# Patient Record
Sex: FEMALE | Race: WHITE | NOT HISPANIC OR LATINO | Employment: OTHER | URBAN - NONMETROPOLITAN AREA
[De-identification: names, ages, dates, MRNs, and addresses within clinical notes are randomized per-mention and may not be internally consistent; named-entity substitution may affect disease eponyms.]

---

## 2018-02-14 ENCOUNTER — OFFICE VISIT CONVERTED (OUTPATIENT)
Dept: FAMILY MEDICINE CLINIC | Age: 58
End: 2018-02-14
Attending: FAMILY MEDICINE

## 2018-06-14 ENCOUNTER — OFFICE VISIT CONVERTED (OUTPATIENT)
Dept: FAMILY MEDICINE CLINIC | Age: 58
End: 2018-06-14
Attending: FAMILY MEDICINE

## 2018-09-18 ENCOUNTER — OFFICE VISIT CONVERTED (OUTPATIENT)
Dept: FAMILY MEDICINE CLINIC | Age: 58
End: 2018-09-18
Attending: FAMILY MEDICINE

## 2018-11-21 ENCOUNTER — OFFICE VISIT CONVERTED (OUTPATIENT)
Dept: FAMILY MEDICINE CLINIC | Age: 58
End: 2018-11-21
Attending: FAMILY MEDICINE

## 2018-12-28 ENCOUNTER — OFFICE VISIT CONVERTED (OUTPATIENT)
Dept: FAMILY MEDICINE CLINIC | Age: 58
End: 2018-12-28
Attending: FAMILY MEDICINE

## 2019-01-25 ENCOUNTER — OFFICE VISIT CONVERTED (OUTPATIENT)
Dept: FAMILY MEDICINE CLINIC | Age: 59
End: 2019-01-25
Attending: FAMILY MEDICINE

## 2019-01-28 ENCOUNTER — HOSPITAL ENCOUNTER (OUTPATIENT)
Dept: OTHER | Facility: HOSPITAL | Age: 59
Discharge: HOME | End: 2019-01-28
Attending: FAMILY MEDICINE

## 2019-02-18 ENCOUNTER — HOSPITAL ENCOUNTER (OUTPATIENT)
Dept: OTHER | Facility: HOSPITAL | Age: 59
Discharge: HOME | End: 2019-02-18
Attending: FAMILY MEDICINE

## 2019-02-18 ENCOUNTER — OFFICE VISIT CONVERTED (OUTPATIENT)
Dept: FAMILY MEDICINE CLINIC | Age: 59
End: 2019-02-18
Attending: FAMILY MEDICINE

## 2019-02-18 LAB
ALBUMIN SERPL-MCNC: 4.2 G/DL (ref 3.5–5)
ALBUMIN/GLOB SERPL: 1.4 {RATIO} (ref 1.4–2.6)
ALP SERPL-CCNC: 86 U/L (ref 53–141)
ALT SERPL-CCNC: 73 U/L (ref 10–40)
ANION GAP SERPL CALC-SCNC: 18 MMOL/L (ref 8–19)
AST SERPL-CCNC: 49 U/L (ref 15–50)
BILIRUB SERPL-MCNC: 0.3 MG/DL (ref 0.2–1.3)
BUN SERPL-MCNC: 15 MG/DL (ref 5–25)
BUN/CREAT SERPL: 17 {RATIO} (ref 6–20)
CALCIUM SERPL-MCNC: 9.5 MG/DL (ref 8.7–10.4)
CHLORIDE SERPL-SCNC: 105 MMOL/L (ref 99–111)
CHOLEST SERPL-MCNC: 172 MG/DL (ref 107–200)
CHOLEST/HDLC SERPL: 3.8 {RATIO} (ref 3–6)
CONV CO2: 22 MMOL/L (ref 22–32)
CONV CREATININE URINE, RANDOM: 63 MG/DL (ref 10–300)
CONV MICROALBUM.,U,RANDOM: <12 MG/L (ref 0–20)
CONV TOTAL PROTEIN: 7.2 G/DL (ref 6.3–8.2)
CREAT UR-MCNC: 0.88 MG/DL (ref 0.5–0.9)
EST. AVERAGE GLUCOSE BLD GHB EST-MCNC: 174 MG/DL
GFR SERPLBLD BASED ON 1.73 SQ M-ARVRAT: >60 ML/MIN/{1.73_M2}
GLOBULIN UR ELPH-MCNC: 3 G/DL (ref 2–3.5)
GLUCOSE SERPL-MCNC: 141 MG/DL (ref 65–99)
HBA1C MFR BLD: 7.7 % (ref 3.5–5.7)
HDLC SERPL-MCNC: 45 MG/DL (ref 40–60)
LDLC SERPL CALC-MCNC: 75 MG/DL (ref 70–100)
MICROALBUMIN/CREAT UR: 19 MG/G{CRE} (ref 0–35)
OSMOLALITY SERPL CALC.SUM OF ELEC: 295 MOSM/KG (ref 273–304)
POTASSIUM SERPL-SCNC: 3.5 MMOL/L (ref 3.5–5.3)
SODIUM SERPL-SCNC: 141 MMOL/L (ref 135–147)
TRIGL SERPL-MCNC: 258 MG/DL (ref 40–150)
VLDLC SERPL-MCNC: 52 MG/DL (ref 5–37)

## 2019-02-25 LAB
CONV LAST MENSTURAL PERIOD: NORMAL
SPECIMEN SOURCE: NORMAL
SPECIMEN SOURCE: NORMAL
THIN PREP CVX: NORMAL

## 2019-02-26 ENCOUNTER — HOSPITAL ENCOUNTER (OUTPATIENT)
Dept: OTHER | Facility: HOSPITAL | Age: 59
Discharge: HOME | End: 2019-02-26
Attending: FAMILY MEDICINE

## 2019-03-25 ENCOUNTER — HOSPITAL ENCOUNTER (OUTPATIENT)
Dept: OTHER | Facility: HOSPITAL | Age: 59
Discharge: HOME | End: 2019-03-25
Attending: FAMILY MEDICINE

## 2019-03-25 ENCOUNTER — OFFICE VISIT CONVERTED (OUTPATIENT)
Dept: FAMILY MEDICINE CLINIC | Age: 59
End: 2019-03-25
Attending: FAMILY MEDICINE

## 2019-03-25 LAB
ALBUMIN SERPL-MCNC: 4.4 G/DL (ref 3.5–5)
ALBUMIN/GLOB SERPL: 1.3 {RATIO} (ref 1.4–2.6)
ALP SERPL-CCNC: 83 U/L (ref 53–141)
ALT SERPL-CCNC: 71 U/L (ref 10–40)
ANION GAP SERPL CALC-SCNC: 22 MMOL/L (ref 8–19)
AST SERPL-CCNC: 51 U/L (ref 15–50)
BILIRUB SERPL-MCNC: 0.43 MG/DL (ref 0.2–1.3)
BUN SERPL-MCNC: 14 MG/DL (ref 5–25)
BUN/CREAT SERPL: 17 {RATIO} (ref 6–20)
CALCIUM SERPL-MCNC: 9.7 MG/DL (ref 8.7–10.4)
CHLORIDE SERPL-SCNC: 104 MMOL/L (ref 99–111)
CONV CO2: 19 MMOL/L (ref 22–32)
CONV TOTAL PROTEIN: 7.9 G/DL (ref 6.3–8.2)
CREAT UR-MCNC: 0.83 MG/DL (ref 0.5–0.9)
EST. AVERAGE GLUCOSE BLD GHB EST-MCNC: 171 MG/DL
GFR SERPLBLD BASED ON 1.73 SQ M-ARVRAT: >60 ML/MIN/{1.73_M2}
GLOBULIN UR ELPH-MCNC: 3.5 G/DL (ref 2–3.5)
GLUCOSE SERPL-MCNC: 177 MG/DL (ref 65–99)
HBA1C MFR BLD: 7.6 % (ref 3.5–5.7)
OSMOLALITY SERPL CALC.SUM OF ELEC: 297 MOSM/KG (ref 273–304)
POTASSIUM SERPL-SCNC: 4.1 MMOL/L (ref 3.5–5.3)
SODIUM SERPL-SCNC: 141 MMOL/L (ref 135–147)

## 2019-04-08 ENCOUNTER — HOSPITAL ENCOUNTER (OUTPATIENT)
Dept: OTHER | Facility: HOSPITAL | Age: 59
Discharge: HOME | End: 2019-04-08
Attending: FAMILY MEDICINE

## 2019-04-08 LAB
ANION GAP SERPL CALC-SCNC: 18 MMOL/L (ref 8–19)
BUN SERPL-MCNC: 15 MG/DL (ref 5–25)
BUN/CREAT SERPL: 17 {RATIO} (ref 6–20)
CALCIUM SERPL-MCNC: 9.5 MG/DL (ref 8.7–10.4)
CHLORIDE SERPL-SCNC: 105 MMOL/L (ref 99–111)
CONV CO2: 23 MMOL/L (ref 22–32)
CREAT UR-MCNC: 0.86 MG/DL (ref 0.5–0.9)
GFR SERPLBLD BASED ON 1.73 SQ M-ARVRAT: >60 ML/MIN/{1.73_M2}
GLUCOSE SERPL-MCNC: 148 MG/DL (ref 65–99)
OSMOLALITY SERPL CALC.SUM OF ELEC: 298 MOSM/KG (ref 273–304)
POTASSIUM SERPL-SCNC: 3.7 MMOL/L (ref 3.5–5.3)
SODIUM SERPL-SCNC: 142 MMOL/L (ref 135–147)

## 2019-05-14 ENCOUNTER — OFFICE VISIT CONVERTED (OUTPATIENT)
Dept: FAMILY MEDICINE CLINIC | Age: 59
End: 2019-05-14
Attending: FAMILY MEDICINE

## 2019-06-10 ENCOUNTER — OFFICE VISIT CONVERTED (OUTPATIENT)
Dept: FAMILY MEDICINE CLINIC | Age: 59
End: 2019-06-10
Attending: FAMILY MEDICINE

## 2019-06-10 ENCOUNTER — HOSPITAL ENCOUNTER (OUTPATIENT)
Dept: OTHER | Facility: HOSPITAL | Age: 59
Discharge: HOME | End: 2019-06-10
Attending: FAMILY MEDICINE

## 2019-06-10 LAB
ALBUMIN SERPL-MCNC: 4.2 G/DL (ref 3.5–5)
ALBUMIN/GLOB SERPL: 1.4 {RATIO} (ref 1.4–2.6)
ALP SERPL-CCNC: 87 U/L (ref 53–141)
ALT SERPL-CCNC: 45 U/L (ref 10–40)
ANION GAP SERPL CALC-SCNC: 17 MMOL/L (ref 8–19)
AST SERPL-CCNC: 28 U/L (ref 15–50)
BILIRUB SERPL-MCNC: 0.35 MG/DL (ref 0.2–1.3)
BUN SERPL-MCNC: 20 MG/DL (ref 5–25)
BUN/CREAT SERPL: 24 {RATIO} (ref 6–20)
CALCIUM SERPL-MCNC: 9.4 MG/DL (ref 8.7–10.4)
CHLORIDE SERPL-SCNC: 106 MMOL/L (ref 99–111)
CONV CO2: 22 MMOL/L (ref 22–32)
CONV TOTAL PROTEIN: 7.1 G/DL (ref 6.3–8.2)
CREAT UR-MCNC: 0.84 MG/DL (ref 0.5–0.9)
EST. AVERAGE GLUCOSE BLD GHB EST-MCNC: 183 MG/DL
GFR SERPLBLD BASED ON 1.73 SQ M-ARVRAT: >60 ML/MIN/{1.73_M2}
GLOBULIN UR ELPH-MCNC: 2.9 G/DL (ref 2–3.5)
GLUCOSE SERPL-MCNC: 196 MG/DL (ref 65–99)
HBA1C MFR BLD: 8 % (ref 3.5–5.7)
OSMOLALITY SERPL CALC.SUM OF ELEC: 300 MOSM/KG (ref 273–304)
POTASSIUM SERPL-SCNC: 4.1 MMOL/L (ref 3.5–5.3)
SODIUM SERPL-SCNC: 141 MMOL/L (ref 135–147)

## 2019-08-09 ENCOUNTER — OFFICE VISIT CONVERTED (OUTPATIENT)
Dept: FAMILY MEDICINE CLINIC | Age: 59
End: 2019-08-09
Attending: FAMILY MEDICINE

## 2019-08-09 ENCOUNTER — HOSPITAL ENCOUNTER (OUTPATIENT)
Dept: OTHER | Facility: HOSPITAL | Age: 59
Discharge: HOME | End: 2019-08-09
Attending: FAMILY MEDICINE

## 2019-08-09 LAB
ALBUMIN SERPL-MCNC: 4.3 G/DL (ref 3.5–5)
ALBUMIN/GLOB SERPL: 1.5 {RATIO} (ref 1.4–2.6)
ALP SERPL-CCNC: 88 U/L (ref 53–141)
ALT SERPL-CCNC: 70 U/L (ref 10–40)
ANION GAP SERPL CALC-SCNC: 21 MMOL/L (ref 8–19)
AST SERPL-CCNC: 48 U/L (ref 15–50)
BILIRUB SERPL-MCNC: 0.39 MG/DL (ref 0.2–1.3)
BUN SERPL-MCNC: 14 MG/DL (ref 5–25)
BUN/CREAT SERPL: 16 {RATIO} (ref 6–20)
CALCIUM SERPL-MCNC: 9.4 MG/DL (ref 8.7–10.4)
CHLORIDE SERPL-SCNC: 104 MMOL/L (ref 99–111)
CHOLEST SERPL-MCNC: 209 MG/DL (ref 107–200)
CHOLEST/HDLC SERPL: 3.3 {RATIO} (ref 3–6)
CONV CO2: 21 MMOL/L (ref 22–32)
CONV TOTAL PROTEIN: 7.1 G/DL (ref 6.3–8.2)
CREAT UR-MCNC: 0.85 MG/DL (ref 0.5–0.9)
EST. AVERAGE GLUCOSE BLD GHB EST-MCNC: 166 MG/DL
GFR SERPLBLD BASED ON 1.73 SQ M-ARVRAT: >60 ML/MIN/{1.73_M2}
GLOBULIN UR ELPH-MCNC: 2.8 G/DL (ref 2–3.5)
GLUCOSE SERPL-MCNC: 153 MG/DL (ref 65–99)
HBA1C MFR BLD: 7.4 % (ref 3.5–5.7)
HDLC SERPL-MCNC: 64 MG/DL (ref 40–60)
LDLC SERPL CALC-MCNC: 110 MG/DL (ref 70–100)
OSMOLALITY SERPL CALC.SUM OF ELEC: 298 MOSM/KG (ref 273–304)
POTASSIUM SERPL-SCNC: 4.2 MMOL/L (ref 3.5–5.3)
SODIUM SERPL-SCNC: 142 MMOL/L (ref 135–147)
TRIGL SERPL-MCNC: 174 MG/DL (ref 40–150)
VLDLC SERPL-MCNC: 35 MG/DL (ref 5–37)

## 2019-11-07 ENCOUNTER — OFFICE VISIT CONVERTED (OUTPATIENT)
Dept: FAMILY MEDICINE CLINIC | Age: 59
End: 2019-11-07
Attending: FAMILY MEDICINE

## 2019-11-25 ENCOUNTER — OFFICE VISIT CONVERTED (OUTPATIENT)
Dept: PULMONOLOGY | Facility: CLINIC | Age: 59
End: 2019-11-25
Attending: INTERNAL MEDICINE

## 2019-12-12 ENCOUNTER — HOSPITAL ENCOUNTER (OUTPATIENT)
Dept: OTHER | Facility: HOSPITAL | Age: 59
Discharge: HOME | End: 2019-12-12
Attending: INTERNAL MEDICINE

## 2020-01-02 ENCOUNTER — HOSPITAL ENCOUNTER (OUTPATIENT)
Dept: CARDIOLOGY | Facility: HOSPITAL | Age: 60
Discharge: HOME | End: 2020-01-02
Attending: INTERNAL MEDICINE

## 2020-01-07 ENCOUNTER — HOSPITAL ENCOUNTER (OUTPATIENT)
Dept: OTHER | Facility: HOSPITAL | Age: 60
Discharge: HOME | End: 2020-01-07
Attending: NURSE PRACTITIONER

## 2020-01-07 ENCOUNTER — OFFICE VISIT CONVERTED (OUTPATIENT)
Dept: FAMILY MEDICINE CLINIC | Age: 60
End: 2020-01-07
Attending: NURSE PRACTITIONER

## 2020-01-07 LAB
ALBUMIN SERPL-MCNC: 4.3 G/DL (ref 3.5–5)
ALBUMIN/GLOB SERPL: 1.4 {RATIO} (ref 1.4–2.6)
ALP SERPL-CCNC: 88 U/L (ref 53–141)
ALT SERPL-CCNC: 140 U/L (ref 10–40)
ANION GAP SERPL CALC-SCNC: 22 MMOL/L (ref 8–19)
AST SERPL-CCNC: 118 U/L (ref 15–50)
BILIRUB SERPL-MCNC: 0.38 MG/DL (ref 0.2–1.3)
BUN SERPL-MCNC: 14 MG/DL (ref 5–25)
BUN/CREAT SERPL: 18 {RATIO} (ref 6–20)
CALCIUM SERPL-MCNC: 9.6 MG/DL (ref 8.7–10.4)
CHLORIDE SERPL-SCNC: 102 MMOL/L (ref 99–111)
CONV CO2: 19 MMOL/L (ref 22–32)
CONV TOTAL PROTEIN: 7.3 G/DL (ref 6.3–8.2)
CREAT UR-MCNC: 0.76 MG/DL (ref 0.5–0.9)
ERYTHROCYTE [DISTWIDTH] IN BLOOD BY AUTOMATED COUNT: 13.4 % (ref 11.7–14.4)
GFR SERPLBLD BASED ON 1.73 SQ M-ARVRAT: >60 ML/MIN/{1.73_M2}
GLOBULIN UR ELPH-MCNC: 3 G/DL (ref 2–3.5)
GLUCOSE SERPL-MCNC: 152 MG/DL (ref 65–99)
HCT VFR BLD AUTO: 46.8 % (ref 37–47)
HGB BLD-MCNC: 15.7 G/DL (ref 12–16)
MAGNESIUM SERPL-MCNC: 2.11 MG/DL (ref 1.6–2.3)
MCH RBC QN AUTO: 31.3 PG (ref 27–31)
MCHC RBC AUTO-ENTMCNC: 33.5 G/DL (ref 33–37)
MCV RBC AUTO: 93.4 FL (ref 81–99)
OSMOLALITY SERPL CALC.SUM OF ELEC: 291 MOSM/KG (ref 273–304)
PLATELET # BLD AUTO: 275 10*3/UL (ref 130–400)
PMV BLD AUTO: 10.7 FL (ref 9.4–12.3)
POTASSIUM SERPL-SCNC: 4 MMOL/L (ref 3.5–5.3)
RBC # BLD AUTO: 5.01 10*6/UL (ref 4.2–5.4)
SODIUM SERPL-SCNC: 139 MMOL/L (ref 135–147)
WBC # BLD AUTO: 8.78 10*3/UL (ref 4.8–10.8)

## 2020-01-08 LAB
AMYLASE SERPL-CCNC: 43 U/L (ref 30–110)
LIPASE SERPL-CCNC: 29 U/L (ref 5–51)

## 2020-01-10 ENCOUNTER — HOSPITAL ENCOUNTER (OUTPATIENT)
Dept: OTHER | Facility: HOSPITAL | Age: 60
Discharge: HOME | End: 2020-01-10
Attending: NURSE PRACTITIONER

## 2020-01-10 LAB
ALBUMIN SERPL-MCNC: 4.1 G/DL (ref 3.5–5)
ALP SERPL-CCNC: 93 U/L (ref 53–141)
ALT SERPL-CCNC: 162 U/L (ref 10–40)
AST SERPL-CCNC: 117 U/L (ref 15–50)
BILIRUB SERPL-MCNC: 0.36 MG/DL (ref 0.2–1.3)
CONV BILI, CONJUGATED: <0.2 MG/DL (ref 0–0.6)
CONV TOTAL PROTEIN: 7.3 G/DL (ref 6.3–8.2)
CONV UNCONJUGATED BILIRUBIN: 0.2 MG/DL (ref 0–1.1)

## 2020-01-12 LAB
CONV HEPATITIS B SURFACE AG W CONFIRMATION RE: NEGATIVE
HAV IGM SERPL QL IA: NEGATIVE
HBV CORE IGM SERPL QL IA: NEGATIVE
HCV AB SER DONR QL: <0.1 S/CO RATIO (ref 0–0.9)

## 2020-01-14 ENCOUNTER — HOSPITAL ENCOUNTER (OUTPATIENT)
Dept: OTHER | Facility: HOSPITAL | Age: 60
Discharge: HOME | End: 2020-01-14
Attending: NURSE PRACTITIONER

## 2020-01-18 ENCOUNTER — OFFICE VISIT CONVERTED (OUTPATIENT)
Dept: FAMILY MEDICINE CLINIC | Age: 60
End: 2020-01-18
Attending: FAMILY MEDICINE

## 2020-01-24 ENCOUNTER — HOSPITAL ENCOUNTER (OUTPATIENT)
Dept: OTHER | Facility: HOSPITAL | Age: 60
Discharge: HOME | End: 2020-01-24
Attending: FAMILY MEDICINE

## 2020-01-24 LAB
ALBUMIN SERPL-MCNC: 4.1 G/DL (ref 3.5–5)
ALP SERPL-CCNC: 92 U/L (ref 53–141)
ALT SERPL-CCNC: 131 U/L (ref 10–40)
AST SERPL-CCNC: 99 U/L (ref 15–50)
BILIRUB SERPL-MCNC: 0.27 MG/DL (ref 0.2–1.3)
CONV BILI, CONJUGATED: <0.2 MG/DL (ref 0–0.6)
CONV TOTAL PROTEIN: 7 G/DL (ref 6.3–8.2)
CONV UNCONJUGATED BILIRUBIN: 0.1 MG/DL (ref 0–1.1)

## 2020-02-07 ENCOUNTER — OFFICE VISIT CONVERTED (OUTPATIENT)
Dept: FAMILY MEDICINE CLINIC | Age: 60
End: 2020-02-07
Attending: FAMILY MEDICINE

## 2020-02-07 ENCOUNTER — HOSPITAL ENCOUNTER (OUTPATIENT)
Dept: OTHER | Facility: HOSPITAL | Age: 60
Discharge: HOME | End: 2020-02-07
Attending: FAMILY MEDICINE

## 2020-02-07 LAB
ALBUMIN SERPL-MCNC: 4 G/DL (ref 3.5–5)
ALBUMIN/GLOB SERPL: 1.3 {RATIO} (ref 1.4–2.6)
ALP SERPL-CCNC: 86 U/L (ref 53–141)
ALT SERPL-CCNC: 123 U/L (ref 10–40)
ANION GAP SERPL CALC-SCNC: 21 MMOL/L (ref 8–19)
AST SERPL-CCNC: 122 U/L (ref 15–50)
BILIRUB SERPL-MCNC: 0.44 MG/DL (ref 0.2–1.3)
BUN SERPL-MCNC: 14 MG/DL (ref 5–25)
BUN/CREAT SERPL: 16 {RATIO} (ref 6–20)
CALCIUM SERPL-MCNC: 9.5 MG/DL (ref 8.7–10.4)
CHLORIDE SERPL-SCNC: 103 MMOL/L (ref 99–111)
CHOLEST SERPL-MCNC: 195 MG/DL (ref 107–200)
CHOLEST/HDLC SERPL: 4.5 {RATIO} (ref 3–6)
CONV CO2: 21 MMOL/L (ref 22–32)
CONV CREATININE URINE, RANDOM: 116.3 MG/DL (ref 10–300)
CONV MICROALBUM.,U,RANDOM: 30.2 MG/L (ref 0–20)
CONV TOTAL PROTEIN: 7.1 G/DL (ref 6.3–8.2)
CREAT UR-MCNC: 0.87 MG/DL (ref 0.5–0.9)
EST. AVERAGE GLUCOSE BLD GHB EST-MCNC: 174 MG/DL
GFR SERPLBLD BASED ON 1.73 SQ M-ARVRAT: >60 ML/MIN/{1.73_M2}
GLOBULIN UR ELPH-MCNC: 3.1 G/DL (ref 2–3.5)
GLUCOSE SERPL-MCNC: 183 MG/DL (ref 65–99)
HBA1C MFR BLD: 7.7 % (ref 3.5–5.7)
HDLC SERPL-MCNC: 43 MG/DL (ref 40–60)
LDLC SERPL CALC-MCNC: 99 MG/DL (ref 70–100)
MICROALBUMIN/CREAT UR: 26 MG/G{CRE} (ref 0–35)
OSMOLALITY SERPL CALC.SUM OF ELEC: 297 MOSM/KG (ref 273–304)
POTASSIUM SERPL-SCNC: 3.9 MMOL/L (ref 3.5–5.3)
SODIUM SERPL-SCNC: 141 MMOL/L (ref 135–147)
TRIGL SERPL-MCNC: 266 MG/DL (ref 40–150)
VLDLC SERPL-MCNC: 53 MG/DL (ref 5–37)

## 2020-02-17 ENCOUNTER — OFFICE VISIT CONVERTED (OUTPATIENT)
Dept: PULMONOLOGY | Facility: CLINIC | Age: 60
End: 2020-02-17
Attending: NURSE PRACTITIONER

## 2020-02-21 ENCOUNTER — OFFICE VISIT CONVERTED (OUTPATIENT)
Dept: GASTROENTEROLOGY | Facility: CLINIC | Age: 60
End: 2020-02-21
Attending: PHYSICIAN ASSISTANT

## 2020-02-21 ENCOUNTER — HOSPITAL ENCOUNTER (OUTPATIENT)
Dept: OTHER | Facility: HOSPITAL | Age: 60
Discharge: HOME | End: 2020-02-21
Attending: PHYSICIAN ASSISTANT

## 2020-02-21 LAB
ALBUMIN SERPL-MCNC: 4.2 G/DL (ref 3.5–5)
ALBUMIN/GLOB SERPL: 1.4 {RATIO} (ref 1.4–2.6)
ALP SERPL-CCNC: 94 U/L (ref 53–141)
ALT SERPL-CCNC: 148 U/L (ref 10–40)
ANION GAP SERPL CALC-SCNC: 23 MMOL/L (ref 8–19)
AST SERPL-CCNC: 144 U/L (ref 15–50)
BASOPHILS # BLD MANUAL: 0.02 10*3/UL (ref 0–0.2)
BASOPHILS NFR BLD MANUAL: 0.2 % (ref 0–3)
BILIRUB SERPL-MCNC: 0.41 MG/DL (ref 0.2–1.3)
BUN SERPL-MCNC: 18 MG/DL (ref 5–25)
BUN/CREAT SERPL: 20 {RATIO} (ref 6–20)
CALCIUM SERPL-MCNC: 9.5 MG/DL (ref 8.7–10.4)
CHLORIDE SERPL-SCNC: 103 MMOL/L (ref 99–111)
CONV CO2: 20 MMOL/L (ref 22–32)
CONV TOTAL PROTEIN: 7.2 G/DL (ref 6.3–8.2)
CREAT UR-MCNC: 0.92 MG/DL (ref 0.5–0.9)
DEPRECATED RDW RBC AUTO: 48.6 FL
EOSINOPHIL # BLD MANUAL: 0.1 10*3/UL (ref 0–0.7)
EOSINOPHIL NFR BLD MANUAL: 1.2 % (ref 0–7)
ERYTHROCYTE [DISTWIDTH] IN BLOOD BY AUTOMATED COUNT: 14.2 % (ref 11.5–14.5)
FERRITIN SERPL-MCNC: 606 NG/ML (ref 10–200)
GFR SERPLBLD BASED ON 1.73 SQ M-ARVRAT: >60 ML/MIN/{1.73_M2}
GLOBULIN UR ELPH-MCNC: 3 G/DL (ref 2–3.5)
GLUCOSE SERPL-MCNC: 214 MG/DL (ref 65–99)
GRANS (ABSOLUTE): 5.87 10*3/UL (ref 2–8)
GRANS: 68.8 % (ref 30–85)
HBA1C MFR BLD: 15.1 G/DL (ref 12–16)
HCT VFR BLD AUTO: 46.1 % (ref 37–47)
IMM GRANULOCYTES # BLD: 0.01 10*3/UL (ref 0–0.54)
IMM GRANULOCYTES NFR BLD: 0.1 % (ref 0–0.43)
IRON SATN MFR SERPL: 19 % (ref 20–55)
IRON SERPL-MCNC: 67 UG/DL (ref 60–170)
LYMPHOCYTES # BLD MANUAL: 1.97 10*3/UL (ref 1–5)
LYMPHOCYTES NFR BLD MANUAL: 6.6 % (ref 3–10)
MCH RBC QN AUTO: 30.6 PG (ref 27–31)
MCHC RBC AUTO-ENTMCNC: 32.8 G/DL (ref 33–37)
MCV RBC AUTO: 93.3 FL (ref 81–99)
MONOCYTES # BLD AUTO: 0.56 10*3/UL (ref 0.2–1.2)
OSMOLALITY SERPL CALC.SUM OF ELEC: 302 MOSM/KG (ref 273–304)
PLATELET # BLD AUTO: 261 10*3/UL (ref 130–400)
PMV BLD AUTO: 10.5 FL (ref 7.4–10.4)
POTASSIUM SERPL-SCNC: 3.8 MMOL/L (ref 3.5–5.3)
RBC # BLD AUTO: 4.94 10*6/UL (ref 4.2–5.4)
SODIUM SERPL-SCNC: 142 MMOL/L (ref 135–147)
TIBC SERPL-MCNC: 345 UG/DL (ref 245–450)
TRANSFERRIN SERPL-MCNC: 241 MG/DL (ref 250–380)
VARIANT LYMPHS NFR BLD MANUAL: 23.1 % (ref 20–45)
WBC # BLD AUTO: 8.53 10*3/UL (ref 4.8–10.8)

## 2020-02-22 LAB
A1AT SERPL-MCNC: 152 MG/DL (ref 101–187)
CERULOPLASMIN SERPL-MCNC: 30.7 MG/DL (ref 19–39)
CONV HEPATITIS B SURFACE AG W CONFIRMATION RE: NEGATIVE
DEPRECATED MITOCHONDRIA M2 IGG SER-ACNC: <20 UNITS (ref 0–20)
DSDNA AB SER-ACNC: NEGATIVE [IU]/ML
ENA AB SER IA-ACNC: NEGATIVE {RATIO}
HAV IGM SERPL QL IA: NEGATIVE
HBV CORE IGM SERPL QL IA: NEGATIVE
HCV AB SER DONR QL: <0.1 S/CO RATIO (ref 0–0.9)

## 2020-02-24 LAB — SMOOTH MUSCLE F-ACTIN AB IGG: 4 UNITS (ref 0–19)

## 2020-02-27 ENCOUNTER — HOSPITAL ENCOUNTER (OUTPATIENT)
Dept: OTHER | Facility: HOSPITAL | Age: 60
Discharge: HOME | End: 2020-02-27
Attending: FAMILY MEDICINE

## 2020-05-06 ENCOUNTER — OFFICE VISIT CONVERTED (OUTPATIENT)
Dept: FAMILY MEDICINE CLINIC | Age: 60
End: 2020-05-06
Attending: FAMILY MEDICINE

## 2020-08-11 ENCOUNTER — HOSPITAL ENCOUNTER (OUTPATIENT)
Dept: OTHER | Facility: HOSPITAL | Age: 60
Discharge: HOME | End: 2020-08-11
Attending: FAMILY MEDICINE

## 2020-08-11 ENCOUNTER — OFFICE VISIT CONVERTED (OUTPATIENT)
Dept: FAMILY MEDICINE CLINIC | Age: 60
End: 2020-08-11
Attending: FAMILY MEDICINE

## 2020-08-11 LAB
ALBUMIN SERPL-MCNC: 4.5 G/DL (ref 3.5–5)
ALBUMIN/GLOB SERPL: 1.4 {RATIO} (ref 1.4–2.6)
ALP SERPL-CCNC: 77 U/L (ref 53–141)
ALT SERPL-CCNC: 49 U/L (ref 10–40)
ANION GAP SERPL CALC-SCNC: 24 MMOL/L (ref 8–19)
AST SERPL-CCNC: 53 U/L (ref 15–50)
BILIRUB SERPL-MCNC: 0.53 MG/DL (ref 0.2–1.3)
BUN SERPL-MCNC: 19 MG/DL (ref 5–25)
BUN/CREAT SERPL: 18 {RATIO} (ref 6–20)
CALCIUM SERPL-MCNC: 10.6 MG/DL (ref 8.7–10.4)
CHLORIDE SERPL-SCNC: 105 MMOL/L (ref 99–111)
CHOLEST SERPL-MCNC: 184 MG/DL (ref 107–200)
CHOLEST/HDLC SERPL: 3.9 {RATIO} (ref 3–6)
CONV CO2: 18 MMOL/L (ref 22–32)
CONV TOTAL PROTEIN: 7.8 G/DL (ref 6.3–8.2)
CREAT UR-MCNC: 1.06 MG/DL (ref 0.5–0.9)
EST. AVERAGE GLUCOSE BLD GHB EST-MCNC: 166 MG/DL
GFR SERPLBLD BASED ON 1.73 SQ M-ARVRAT: 58 ML/MIN/{1.73_M2}
GLOBULIN UR ELPH-MCNC: 3.3 G/DL (ref 2–3.5)
GLUCOSE SERPL-MCNC: 155 MG/DL (ref 65–99)
HBA1C MFR BLD: 7.4 % (ref 3.5–5.7)
HDLC SERPL-MCNC: 47 MG/DL (ref 40–60)
LDLC SERPL CALC-MCNC: 98 MG/DL (ref 70–100)
OSMOLALITY SERPL CALC.SUM OF ELEC: 301 MOSM/KG (ref 273–304)
POTASSIUM SERPL-SCNC: 4.3 MMOL/L (ref 3.5–5.3)
SODIUM SERPL-SCNC: 143 MMOL/L (ref 135–147)
TRIGL SERPL-MCNC: 194 MG/DL (ref 40–150)
VLDLC SERPL-MCNC: 39 MG/DL (ref 5–37)

## 2020-08-27 ENCOUNTER — OFFICE VISIT CONVERTED (OUTPATIENT)
Dept: PULMONOLOGY | Facility: CLINIC | Age: 60
End: 2020-08-27
Attending: NURSE PRACTITIONER

## 2020-09-01 ENCOUNTER — OFFICE VISIT CONVERTED (OUTPATIENT)
Dept: NEUROLOGY | Facility: CLINIC | Age: 60
End: 2020-09-01
Attending: PSYCHIATRY & NEUROLOGY

## 2020-09-22 ENCOUNTER — OFFICE VISIT CONVERTED (OUTPATIENT)
Dept: FAMILY MEDICINE CLINIC | Age: 60
End: 2020-09-22
Attending: FAMILY MEDICINE

## 2020-12-03 ENCOUNTER — OFFICE VISIT CONVERTED (OUTPATIENT)
Dept: FAMILY MEDICINE CLINIC | Age: 60
End: 2020-12-03
Attending: FAMILY MEDICINE

## 2021-02-17 ENCOUNTER — OFFICE VISIT CONVERTED (OUTPATIENT)
Dept: FAMILY MEDICINE CLINIC | Age: 61
End: 2021-02-17
Attending: FAMILY MEDICINE

## 2021-02-17 ENCOUNTER — HOSPITAL ENCOUNTER (OUTPATIENT)
Dept: OTHER | Facility: HOSPITAL | Age: 61
Discharge: HOME | End: 2021-02-17
Attending: FAMILY MEDICINE

## 2021-02-17 LAB
ALBUMIN SERPL-MCNC: 4.3 G/DL (ref 3.5–5)
ALBUMIN/GLOB SERPL: 1.4 {RATIO} (ref 1.4–2.6)
ALP SERPL-CCNC: 103 U/L (ref 53–141)
ALT SERPL-CCNC: 29 U/L (ref 10–40)
ANION GAP SERPL CALC-SCNC: 15 MMOL/L (ref 8–19)
AST SERPL-CCNC: 28 U/L (ref 15–50)
BILIRUB SERPL-MCNC: 0.46 MG/DL (ref 0.2–1.3)
BUN SERPL-MCNC: 16 MG/DL (ref 5–25)
BUN/CREAT SERPL: 20 {RATIO} (ref 6–20)
CALCIUM SERPL-MCNC: 9.6 MG/DL (ref 8.7–10.4)
CHLORIDE SERPL-SCNC: 103 MMOL/L (ref 99–111)
CHOLEST SERPL-MCNC: 184 MG/DL (ref 107–200)
CHOLEST/HDLC SERPL: 4.4 {RATIO} (ref 3–6)
CONV CO2: 23 MMOL/L (ref 22–32)
CONV CREATININE URINE, RANDOM: 67.4 MG/DL (ref 10–300)
CONV MICROALBUM.,U,RANDOM: 22.8 MG/L (ref 0–20)
CONV TOTAL PROTEIN: 7.3 G/DL (ref 6.3–8.2)
CREAT UR-MCNC: 0.8 MG/DL (ref 0.5–0.9)
EST. AVERAGE GLUCOSE BLD GHB EST-MCNC: 154 MG/DL
GFR SERPLBLD BASED ON 1.73 SQ M-ARVRAT: >60 ML/MIN/{1.73_M2}
GLOBULIN UR ELPH-MCNC: 3 G/DL (ref 2–3.5)
GLUCOSE SERPL-MCNC: 100 MG/DL (ref 65–99)
HBA1C MFR BLD: 7 % (ref 3.5–5.7)
HDLC SERPL-MCNC: 42 MG/DL (ref 40–60)
LDLC SERPL CALC-MCNC: 102 MG/DL (ref 70–100)
MICROALBUMIN/CREAT UR: 33.8 MG/G{CRE} (ref 0–35)
OSMOLALITY SERPL CALC.SUM OF ELEC: 285 MOSM/KG (ref 273–304)
POTASSIUM SERPL-SCNC: 4.3 MMOL/L (ref 3.5–5.3)
SODIUM SERPL-SCNC: 137 MMOL/L (ref 135–147)
TRIGL SERPL-MCNC: 202 MG/DL (ref 40–150)
VLDLC SERPL-MCNC: 40 MG/DL (ref 5–37)

## 2021-03-15 ENCOUNTER — OFFICE VISIT CONVERTED (OUTPATIENT)
Dept: PULMONOLOGY | Facility: CLINIC | Age: 61
End: 2021-03-15
Attending: NURSE PRACTITIONER

## 2021-03-30 ENCOUNTER — HOSPITAL ENCOUNTER (OUTPATIENT)
Dept: OTHER | Facility: HOSPITAL | Age: 61
Discharge: HOME | End: 2021-03-30
Attending: FAMILY MEDICINE

## 2021-05-10 NOTE — H&P
History and Physical      Patient Name: Meliza Arreola   Patient ID: [id number]   Sex: Female   YOB: 1960    Primary Care Provider: Ne Palmer MD   Referring Provider: Ne Palmer MD    Visit Date: September 1, 2020    Provider: Kervin Guadarrama MD   Location: [facility]   Location Address: [address] [place] [address]  [id number]   Location Phone: [phone]          Chief Complaint     BLE pain and weakness from 6 months to year       History Of Present Illness  Meliza Arreola is a 59 year old /White female who presents today to [facility] today referred from Ne Palmer MD.      59-year-old woman  4 weakness happens randomly during the daytime for several months.  She states that it is happens when she is walking predominantly or going up the steps.  She has COPD.  She has significant anxiety.  She states that her legs get weak.  She has gotten dizzy many times with this and felt like passing out as well.  When she is walking at times she cannot walk that her  has to pick her up with his car.  She states that she has no problems sitting down getting up from a sitting to standing position or from the floor.  She states when she has the symptoms of weakness she starts wobbling around from side to side.    She is also complaining of sharp burning, shooting and stabbing pains in both feet for the past year.  She states that it happens all the time and it goes up to her mid leg.  She has a history of diabetes.  She is here  today for nerve conduction study.       Past Medical History  Diabetes; Hyperlipidemia; Hypertension; Reflux         Past Surgical History  Ablation; Cholecystectomy; Knee Replacement; Pacemaker; Tubal ligation         Medication List  albuterol sulfate 90 mcg/actuation inhalation HFA aerosol inhaler; amiodarone 100 mg oral tablet; atorvastatin 10 mg oral tablet; Cartia  mg oral  capsule,extended release 24hr; Eliquis 5 mg oral tablet; glipizide 5 mg oral tablet extended release 24hr; hydrochlorothiazide 12.5 mg oral capsule; hydrocodone-acetaminophen 7.5-300 mg oral tablet; hydroxyzine HCl 50 mg oral tablet; Invokana 100 mg oral tablet; losartan 25 mg oral tablet; Lyrica 100 mg oral capsule; metoprolol succinate 25 mg oral tablet extended release 24 hr; omeprazole 40 mg oral capsule,delayed release(DR/EC)         Allergy List  NO KNOWN DRUG ALLERGIES         Social History  Tobacco (Former)         Review of Systems  · Constitutional  o Admits  o : fatigue  o Denies  o : chills, excessive sweating, fever, sycope/passing out, weight gain, weight loss  · Eyes  o Denies  o : changes in vision, blurry vision, double vision  · HENT  o Denies  o : loss of hearing, ringing in the ears, ear aches, sore throat, nasal congestion, sinus pain, nose bleeds, seasonal allergies  · Cardiovascular  o Admits  o : easy burising or bleeding  o Denies  o : blood clots, swollen legs, anemia, transfusions  · Respiratory  o Admits  o : shortness of breath, COPD  o Denies  o : dry cough, productive cough, pneumonia  · Gastrointestinal  o Denies  o : difficulty swallowing, reflux  · Genitourinary  o Denies  o : incontinence  · Neurologic  o Admits  o : dizziness/vertigo, difficulty with sleep, numbness/tingling/paresthesia   o Denies  o : headache, seizure, stroke, tremor, loss of balance, falls, difficulty with coordination, difficulty with dexterity, weakness  · Musculoskeletal  o Admits  o : joint pain, weakness, sciatica, pain radiating in arm, pain radiating in leg, low back pain  o Denies  o : neck stiffness/pain, swollen lymph nodes, muscle aches, spasms  · Endocrine  o Admits  o : diabetes  o Denies  o : thyroid disorder  · Psychiatric  o Admits  o : anxiety, depression      Vitals  Date Time BP Position Site L\R Cuff Size HR RR TEMP (F) WT  HT  BMI kg/m2 BSA m2 O2 Sat        09/01/2020 02:20 PM        97                Physical Examination     She is alert, fluent, phasic, follows commands well.  EOMs are full directions gaze, facial strength is full, soft palate elevation and tongue are normal.  There is no weakness of the upper or lower extremities individual muscle testing.  Fine finger movements are intact.  There is no weakness proximally or distally.  There is no atrophy or fasciculations.  Reflexes are marked and symmetrical biceps, triceps, patellar's and ankles.  Cerebellar testing is intact finger-nose, rapid altering movements and fine finger movements.  Station gait she is able to tiptoe, heel walk, gustavo and tiptoe without any difficulty.  She is able to get up from a chair with arms crossed 5 times difficulty.  She is able to do a partial knee bend because of her left knee replacement.    Hyperventilation for 20 seconds reproduce the symptoms of dizziness and her legs got weak that she almost fell and had to set her down.  She states that this is the spells that she has on a daily basis.           Assessment  · Anxiety     300.00/F41.9  · Hyperventilation syndrome     306.1/F45.8  Told her that her symptoms of weakness is secondary to hyperventilation syndrome. It is from anxiety disorder and I would recommend for her to be treated anxiety more aggressively. I told her that she needs to slow down her breathing when she has 1 of the spells so she does not get to the point where she becomes very weak and feel like she is going to pass out.  · Numbness and tingling       Anesthesia of skin     782.0/R20.0  Paresthesia of skin     782.0/R20.2  The study is normal and does not show electrophysiologic evidence for peripheral neuropathy involving the medium large fibers or involvement of her hands.  · Small fiber neuropathy     356.9/G62.9  Burning sensations in her feet and legs are likely secondary to small fiber neuropathy since nerve conduction studies unremarkable  · Weakness     780.79/R53.1  The weakness  in her lower extremities is intermittent throughout the day is secondary to psychogenic induced weakness from hyperventilation syndrome. I told her that this is not secondary to diabetic radicular plexus neuropathy or lumbosacral radiculopathy.    30 minutes was spent for this low complexity encounter more than half the time was spent face-to-face the patient examination, counseling, planning and recommendations.    Problems Reconciled  Plan  · Orders  o Nerve conduction studies; 7-8 studies (23650) - 356.9/G62.9, 782.0/R20.0, 780.79/R53.1 - 09/01/2020  · Medications  o Medications have been Reconciled  o Transition of Care or Provider Policy  · Instructions  o Encouraged to follow-up with Primary Care Provider for preventative care.            Electronically Signed by: Kervin Guadarrama MD -Author on September 1, 2020 03:55:31 PM

## 2021-05-14 VITALS — TEMPERATURE: 97 F

## 2021-05-15 VITALS
SYSTOLIC BLOOD PRESSURE: 155 MMHG | DIASTOLIC BLOOD PRESSURE: 61 MMHG | WEIGHT: 197.12 LBS | BODY MASS INDEX: 32.84 KG/M2 | HEART RATE: 80 BPM | OXYGEN SATURATION: 95 % | HEIGHT: 65 IN

## 2021-05-18 NOTE — PROGRESS NOTES
"Meliza Arreola  1960     Office/Outpatient Visit    Visit Date: Sat, Jan 18, 2020 09:09 am    Provider: Ne Palmer MD (Assistant: Haven Kapadia MA)    Location: [facility]        Electronically signed by Ne Palmer MD on  01/18/2020 09:53:47 AM                             Subjective:        CC: Ms. Arreola is a 59 year old White female.  This is a follow-up visit.  on stomach pain;         HPI: Meliza is here today to follow up on abdominal pain.  She was seen by Shivani 2 weeks ago with this complaint, at which point, she had been having pain 3 weeks prior (total of 5 weeks of sx today).         Nausea started right before Ajit Asmita.  The nausea got worse and worse.  Two-three weeks ago, she noticed her stomach starting to swell.  She has been having tenderness around that time as well.  Last night, she started to develop actual pain.  Her bowel movements have been normal but \"a couple of times, it looked like charcoal.\"  That was 2-3 weeks ago.  +Diarrhea, no constipation.  No hematochezia.  No fevers or chills.  +Malaise.          She had labs done that showed transaminitis with AST/ALT 2-4x the upper limits of normal.  She had a CT A/P done that showed a high density liver.  She is on amiodarone, which was postulated as one of the possible etiologies for this.    ROS:     CONSTITUTIONAL:  Negative for unintentional weight gain and unintentional weight loss.      CARDIOVASCULAR:  Negative for chest pain, orthopnea, paroxysmal nocturnal dyspnea and pedal edema.      RESPIRATORY:  Negative for recent cough and dyspnea.      GASTROINTESTINAL:  Positive for abdominal pain, acid reflux symptoms ( indigestion and belching ), anorexia, abdominal bloating, dysphagia ( rare ), diarrhea, melena ( 2-3 weeks ago, no recurrence ) and nausea.   Negative for constipation or vomiting.      PSYCHIATRIC:  Positive for anxiety.          Past Medical History / Family History / Social " History:         Last Reviewed on 1/18/2020 09:36 AM by Ne Palmer    Past Medical History:                 PAST MEDICAL HISTORY         Hyperlipidemia    Hypertension     COPD    Sleep Apnea     Chronic Pain     Type 2 Diabetes     Depression    Anxiety    PTSD         PREVENTIVE HEALTH MAINTENANCE             COLORECTAL CANCER SCREENING: Up to date (colonoscopy q10y; sigmoidoscopy q5y; Cologuard q3y) was last done 11/21/2014, Results are in chart; colonoscopy with the following abnormalities noted-- hemorrhoids     EYE EXAM: Diabetic Eye Exam during this calendar year and results are in chart was last done 8/30/2019     MAMMOGRAM: Done within last 2 years and results in are chart was last done 2/1/2019 with normal results     PAP SMEAR: was last done 2/19/2019 with normal results NIL (no HPV run d/t Medicare pt)         PAST MEDICAL HISTORY                 ADVANCED DIRECTIVES: None         Surgical History:         Cholecystectomy    Joint Replacement: L knee - 11/2015;     Tubal Ligation     L rotator cuff repair - 1/2018;         Family History:         Positive for Coronary Artery Disease ( father; mother ).      Positive for grandparents.          Social History:     Occupation: [employer]     Marital Status:      Children: 2 children         Tobacco/Alcohol/Supplements:     Last Reviewed on 1/18/2020 09:36 AM by Ne Palmer    Tobacco: She has a past history of cigarette smoking; quit date:  2011.          Substance Abuse History:     Last Reviewed on 1/18/2020 09:36 AM by Ne Palmer        Mental Health History:     Last Reviewed on 1/18/2020 09:36 AM by Ne Palmer        Communicable Diseases (eg STDs):     Last Reviewed on 1/18/2020 09:36 AM by Ne Palmer        Current Problems:     Last Reviewed on 11/07/2019 11:56 AM by Ne Palmer    Type 2 diabetes mellitus with diabetic polyneuropathy    HLD - Mixed hyperlipidemia    Depression - Major depressive disorder, single  episode, moderate    PTSD - Post-traumatic stress disorder, chronic    Obstructive sleep apnea (adult) (pediatric)    HTN - Essential (primary) hypertension    COPD - Chronic obstructive pulmonary disease, unspecified    Presence of unspecified artificial knee joint    Pain in left knee    Low back pain    Squamous cell carcinoma of skin of unspecified upper limb, including shoulder    GERD - Gastro-esophageal reflux disease without esophagitis    Rosacea, unspecified    Long term (current) use of opiate analgesic    Actinic keratosis    Other amnesia    Dizziness and giddiness    Unspecified atrial fibrillation    Unspecified chronic gastritis without bleeding    Nausea    Abnormal results of liver function studies        Immunizations:     Pneumococcal conjugate PCV 13 11/25/2019    zzFluzone pf-quadrivalent 3 and up 11/17/2014    zzFluzone pf-quadrivalent 3 and up 11/1/2016    Fluzone Quadrivalent (3+ years) 11/7/2019        Allergies:     Last Reviewed on 1/07/2020 03:57 PM by Jahaira Siegel    No Known Allergies.        Current Medications:     Last Reviewed on 1/07/2020 03:57 PM by Jahaira Siegel    Clonazepam 0.5 mg oral tablet [1 bid]    losartan-hydrochlorothiazide 50-12.5 mg oral tablet [1 tab daily]    amitriptyline 75 mg oral tablet [Take 1 tablet(s) by mouth qHS]    Ventolin HFA 90 mcg/actuation Inhalation HFA Aerosol Inhaler [inhale 2 puffs q 4hrs prn for wheezing/SOA]    Invokana 100 mg oral tablet [TAKE 1 TABLET BY MOUTH ONCE DAILY]    metFORMIN 500 mg oral Tablet, Extended Release 24 hr [TAKE 2 TABLETS BY MOUTH ONCE DAILY AND THEN TAKE 1 ONCE DAILY AT BEDTIME]    atorvastatin 10 mg oral tablet [TAKE 1 TABLET BY MOUTH ONCE DAILY]    HYDROcodone-acetaminophen 7.5-325 mg oral tablet [1 po tid PRN pain]    Spiriva HandiHaler 18mcg/1capsule Capsules for Inhalation [Inhale contents of 1 capsule(s) by inhaler device by mouth daily thru inhaler.]    Hydroxyzine HCl 25 mg oral tablet [1 tab q8h prn anxiety]     Aripiprazole 2 mg oral tablet [1 tab  am]    Lyrica 150 mg oral capsule [take 1 capsule (150 mg) by oral route QHS]    omeprazole 40 mg oral capsule,delayed release (enteric coated) [TAKE 1 CAPSULE BY MOUTH ONCE DAILY]    Topiramate 50 mg oral Capsule, Sprinkle, Extended Release 24 hr Dose Pack [Take 1 cap by mouth daily]    Eliquis 5mg Tablet [Take 1 tablet(s) by mouth bid]    amiodarone 200 mg oral tablet [1 tab bid]    Diltiazem HCl 120 mg oral Capsule, Extended Release 24 hr [Take 1 capsule(s) by mouth daily]    sucralfate 1 gram oral tablet [take 1 tablet (1 gram) by oral route 2 times per day on an empty stomach]        Objective:        Vitals:         Current: 1/18/2020 9:17:20 AM    Ht:  5 ft, 4 in;  Wt: 197.2 lbs;  BMI: 33.8T: 97.7 F (oral);  BP: 146/59 mm Hg (left arm, sitting);  P: 61 bpm (left arm (BP Cuff), sitting);  sCr: 0.76 mg/dL;  GFR: 91.04        Exams:     PHYSICAL EXAM:     GENERAL: vital signs recorded - well developed, well nourished;  well groomed;  no apparent distress, anxious, seems to be in moderate pain;     RESPIRATORY: normal respiratory rate and pattern with no distress; normal breath sounds with no rales, rhonchi, wheezes or rubs;     CARDIOVASCULAR: normal rate; rhythm is regular;  no systolic murmur; no edema;     GASTROINTESTINAL: moderate epigastric, LUQ, and RUQ pain;  hyperactive bowel sounds; no organomegaly;     PSYCHIATRIC:  appropriate affect and demeanor; normal speech pattern; grossly normal memory;         Assessment:         T45.8X5A   Adverse effect of other primarily systemic and hematological agents, initial encounter       R94.5   Abnormal results of liver function studies       I48.91   Unspecified atrial fibrillation           ORDERS:         Meds Prescribed:       [New Rx] metoprolol succinate 25 mg oral Tablet, Extended Release 24 hr [take 1 tablet (25 mg) by oral route once daily], #30 (thirty) tablets, Refills: 3 (three)                 Plan:         Adverse  effect of other primarily systemic and hematological agents, initial encounterConcern for drug-induced hepatitis caused by amiodarone.  Called and spoke with pt's cardiologist Santosh Warren.  Recommended to d/c amiodarone today and add metoprolol 25 mg ER daily.  She can take a second dose of metoprolol prn a-fib spell.  He would like her to follow up with Dr. Gross in clinic a week from this Wednesday.  Recheck a hepatic function panel in 1 week.  Greatly appreciate Santosh's assistance.        30 min was spent in this face to face visit.          Prescriptions:       [New Rx] metoprolol succinate 25 mg oral Tablet, Extended Release 24 hr [take 1 tablet (25 mg) by oral route once daily], #30 (thirty) tablets, Refills: 3 (three)         Abnormal results of liver function studiesAs above.        Unspecified atrial fibrillationAs above.            Charge Capture:         Primary Diagnosis:     T45.8X5A  Adverse effect of other primarily systemic and hematological agents, initial encounter           Orders:      [id number]  Office/outpatient visit; established patient, level 4  (In-House)              R94.5  Abnormal results of liver function studies     I48.91  Unspecified atrial fibrillation

## 2021-05-18 NOTE — PROGRESS NOTES
"Meliza Arreola. 1960     Office/Outpatient Visit    Visit Date: Wed, Feb 14, 2018 11:01 am    Provider: Ne Palmer MD (Assistant: Sindy Bo)    Location: [facility]        Electronically signed by Ne Palmer MD on  02/14/2018 02:15:43 PM                             SUBJECTIVE:        CC:     Ms. Arreola is a 57 year old White female.  follow up; chronic pain, DM, HTN, COPD, anxiety         HPI: Meliza is here today to follow up on chronic issues.        She has been on hydrocodone/APAP and diclofenac BID for knee pain, shoulder pain, and back pain, typically using 2-3 tabs per day of the Norco.  She has tried both PT and epidurals in the past but did not get much relief from those.  She is s/p L TKA.  She follows with Dr. Stout.  He has been working on her L shoulder as well.  She had surgery on 1-21-18 to repair the rotator cuff tear.  (Dr. Stout had her on oxycodone temporarily after the surgery -- these caused her a lot of sedation.)  He cleaned out some bone spurs as well.  She is doing her PT.  That is getting better.  It is still very sore.  She is doing her home exercises religiously.  She is on Lyrica and amitriptyline for diabetic peripheral neuropathy.  The amitriptyline also helps her insomnia.  The Lyrica in particular helps her with her burning foot pain.        She is on metformin and Invokana for diabetes.  Last was A1c 6.6 in October.  She is due for labs.  She is on an ACEI and statin.  She is UTD on foot exam (8/2017).  She reports being UTD on eye exam.        She is on losartan/HCTZ for HTN.  BP has been well controlled.  No CP or palpitations.        She is on venlafaxine, Abilify, buspirone and hydroxyzine prn for chronic PTSD and follows with Psychiatry.  Her anxiety has been worse lately.  Her psychiatrist (Dr. Romero) increased her buspirone lately.        She is on Breo Ellipta, Incruse Ellipta, and Spiriva for COPD.  Her breathing has been \"so-so.\" "  She has been using her nebulizer more lately because of the weather changes.  It does make her feel jittery when she needs it.      ROS:     CONSTITUTIONAL:  Negative for fatigue and fever.      EYES:  Negative for blurred vision.      E/N/T:  Negative for diminished hearing, nasal congestion and frequent rhinorrhea.      CARDIOVASCULAR:  Negative for chest pain and palpitations.      RESPIRATORY:  Positive for chronic cough and dyspnea ( with moderate exertion ).   Negative for recent cough or frequent wheezing.      GASTROINTESTINAL:  Negative for abdominal pain, constipation, diarrhea, nausea and vomiting.      MUSCULOSKELETAL:  Positive for arthralgias, (L shoulder) back pain, joint stiffness and limb pain ( left upper extremity pain ).   Negative for myalgias.      NEUROLOGICAL:  Positive for paresthesia ( bilateral lower extremity ).   Negative for headaches or weakness.      PSYCHIATRIC:  Positive for anxiety and feelings of stress.   Negative for depression, anhedonia or sleep disturbance.          PMH/FMH/SH:     Last Reviewed on 2/14/2018 11:17 AM by Ne Palmer    Past Medical History:                 PAST MEDICAL HISTORY         Hyperlipidemia    Hypertension     COPD    Sleep Apnea     Chronic Pain     Type 2 Diabetes     Depression    Anxiety    PTSD         PREVENTIVE HEALTH MAINTENANCE             COLORECTAL CANCER SCREENING: Up to date (colonoscopy q10y; sigmoidoscopy q5y; Cologuard q3y) was last done 11/21/2014, Results are in chart; colonoscopy with the following abnormalities noted-- hemorrhoids     MAMMOGRAM: was last done 9/28/16 with normal results     PAP SMEAR: was last done 8/2014 with normal results         PAST MEDICAL HISTORY                 ADVANCED DIRECTIVES: None         Surgical History:         Cholecystectomy    Joint Replacement: L knee - 11/2015;     Tubal Ligation      L rotator cuff repair - 1/2018;         Family History:         Positive for Coronary Artery Disease (  father; mother ).      Positive for grandparents.          Social History:     Occupation: [employer]     Marital Status:      Children: 2 children         Tobacco/Alcohol/Supplements:     Last Reviewed on 2/14/2018 11:17 AM by Ne Palmer    Tobacco: She has a past history of cigarette smoking; quit date:  2011.          Substance Abuse History:     Last Reviewed on 2/14/2018 11:17 AM by Ne Palmer        Mental Health History:     Last Reviewed on 2/14/2018 11:17 AM by Ne Palmer        Communicable Diseases (eg STDs):     Last Reviewed on 2/14/2018 11:17 AM by Ne Palmer            Current Problems:     Last Reviewed on 11/15/2017 09:15 AM by Ne Palmer    Peripheral neuropathy     Actinic keratosis     Syncope     Use of high risk medications     Rosacea     GERD     Squamous cell carcinoma of skin of upper limb, including shoulder     Low back pain     Artificial joint replacement, Knee     Obstructive sleep apnea     Type 2 diabetes     COPD     Hyperlipidemia     Chronic PTSD     Depression     Hypertension     Knee pain         Immunizations:     Fluzone pf-quadrivalent 3 and up 11/17/2014     Fluzone pf-quadrivalent 3 and up 11/1/2016         Allergies:     Last Reviewed on 2/14/2018 11:10 AM by iSndy Bo      No Known Drug Allergies.         Current Medications:     Last Reviewed on 2/14/2018 11:10 AM by Sindy Bo    Hydrocodone/Acetaminophen 7.5mg/325mg Tablet 1 po tid PRN pain     Amitriptyline HCl 75mg Tablet Take 1 tablet by mouth qHS     Atorvastatin Calcium 10mg Tablet 1 tab daily     Breo Ellipta 100mcg/25mcg Inhalation Powder Take 1 inhalation(s) by mouth daily     Losartan/Hydrochlorothiazide 50mg/12.5mg Tablet 1 tab daily     Invokana 100mg Tablet 1 tab daily     Metformin HCl 500mg Tablets, Extended Release 1 tab bid     Prilosec 40mg Capsules, Extended Release 1 tab daily     Incruse Ellipta 62.5mcg/1blister Inhalation Powder Inhale 1 inhalation(s)  by mouth daily     Aripiprazole 2mg Tablet 1 tab  am     Buspirone HCl 15mg Tablet 1 tab po q 8 hrs prn anxiety     Hydroxyzine HCl 25mg Tablet 1 tab q8h prn anxiety     diclofenac 75mg 1 BID     Venlafaxine HCl 150mg Capsules, Extended Release 1 tab daily     Venlafaxine HCl 75mg Capsules, Extended Release 1 cap daily     Aspirin (ASA) 81mg Tablets, Enteric Coated 1 tab daily     Lyrica 50mg Capsules Take 1 capsule(s) by mouth daily         OBJECTIVE:        Vitals:         Current: 2/14/2018 11:06:29 AM    Ht:  5 ft, 4 in;  Wt: 219.5 lbs;  BMI: 37.7    T: 97.1 F (oral);  BP: 91/60 mm Hg (right arm, sitting);  P: 72 bpm (right arm (BP Cuff), sitting);  sCr: 0.84 mg/dL;  GFR: 88.22        Exams:     PHYSICAL EXAM:     GENERAL: vital signs recorded - well developed, well nourished;  well groomed;  no apparent distress;     EYES: extraocular movements intact; conjunctiva and cornea are normal; PERRLA;     E/N/T: OROPHARYNX:  normal mucosa, dentition, gingiva, and posterior pharynx;     RESPIRATORY: normal respiratory rate and pattern with no distress; normal breath sounds with no rales, rhonchi, wheezes or rubs;     CARDIOVASCULAR: normal rate; rhythm is regular;  no edema;     GASTROINTESTINAL: nontender; normal bowel sounds;     MUSCULOSKELETAL: gait: antalgic;  normal overall tone     PSYCHIATRIC: appropriate affect and demeanor; normal psychomotor function; normal speech pattern; normal thought and perception;         Lab/Test Results:             Urine temperature:  CONFIRMED (02/14/2018),     All urine drug screen levels confirmed negative:  yes (02/14/2018),     Date and time of last pill:  HYDROCODONE 2-13-18 @ 5PM (02/14/2018),     Performed by:  MAXIM (02/14/2018),     Collection Time:  1130 (02/14/2018),             ASSESSMENT           250.00   E11.42  Type 2 diabetes              DDx:     724.2   M54.5  Low back pain              DDx:     719.46   M25.562  Knee pain              DDx:     356.9   E08.42   Peripheral neuropathy              DDx:     V58.69   Z79.891  Use of high risk medications              DDx:     496   J44.9  COPD              DDx:     401.1   I10  Hypertension              DDx:     309.81   F43.12  Chronic PTSD              DDx:         ORDERS:         Lab Orders:       [id number]  DIAB - [facility] CMP A1C LIPID AND MICRO ALBUM CR RATIO: [id number]  (Send-Out)         [id number]  Drug test prsmv qual dir optical obs per day  (In-House)                   PLAN:          Type 2 diabetes She is on metformin and Invokana for diabetes.  No refills needed.  Last was A1c 6.6 in October.  She is due for labs; ordered as below.  She is on an ACEI and statin.  She is UTD on foot exam (8/2017).  She reports being UTD on eye exam; requesting records from Dr. Brandee Sewell.  RTC 4 months.     LABORATORY:  Labs ordered to be performed today include Diabetes Panel 2;CMP, A1C, Lipid, Microalbumin:Creatinine Ratio.      FOLLOW-UP: Schedule a follow-up visit in 4 months..            Orders:       [id number]  [address] CMP A1C LIPID AND MICRO ALBUM CR RATIO: [id number]  (Send-Out)             Patient Education Handouts:       [facility] Medication Compliance           Low back pain She is stable on her hydrocodone/APAP,  back at #70 per month.  (I had increased her temporarily one month to #75 when she was dealing with all the shoulder pain.)  She is also doing well with the Lyrica and amitriptyline for peripheral neuropathy.  No adverse effects.  She does require ongoing use of this controlled substance to function.  No refills needed today.  Tox screen and Huber run.  RTC 4 months when I get back from maternity leave.          Knee pain As per back pain.          Peripheral neuropathy As per back pain.          Use of high risk medications         FOLLOW-UP: Schedule a follow-up visit in 4 months..  Controlled substance documentation: Huber reviewed; drug screen performed and appropriate; consent is  reviewed and signed and on the chart.  She is aware of risk of addiction on this medication, understands that she will need to follow up for a review every 3 months and her medications will be adjusted or decreased as deemed appropriate at each visit.  No history of drug or alcohol abuse.  No concerns about diversion or abuse. She denies side effects related to the medication.  She is aware that she may be called in for pill counts.  The dosing of this medication will be reviewed on a regular basis and reduced if possible..  Ongoing use of a controlled substance is necessary for this patient to have a normal quality of life Drug screen           Orders:       [id number]  Drug test prsmv qual dir optical obs per day  (In-House)            COPD Stable.  No refills needed.          Hypertension BP at goal.  No refills needed.  Checking labs.  BP is actually on the low side today.  I would like her to start checking BP at home so we can see if we need to back off on her meds.          Chronic PTSD Follows with Psych Dr. Romero.             Patient Recommendations:        For  Type 2 diabetes:     Schedule a follow-up visit in 4 months.                APPOINTMENT INFORMATION:        Monday Tuesday Wednesday Thursday Friday Saturday Sunday            Time:___________________AM  PM   Date:_____________________         For  Use of high risk medications:     Schedule a follow-up visit in 4 months.                APPOINTMENT INFORMATION:        Monday Tuesday Wednesday Thursday Friday Saturday Sunday            Time:___________________AM  PM   Date:_____________________             CHARGE CAPTURE           **Please note: ICD descriptions below are intended for billing purposes only and may not represent clinical diagnoses**        Primary Diagnosis:         250.00 Type 2 diabetes            E11.42    Type 2 diabetes mellitus with diabetic polyneuropathy              Orders:          11910   Office/outpatient visit;  established patient, level 4  (In-House)           724.2 Low back pain            M54.5    Low back pain    719.46 Knee pain            M25.562    Pain in left knee    356.9 Peripheral neuropathy            E08.42    Diabetes mellitus due to underlying condition with diabetic polyneuropathy    V58.69 Use of high risk medications            Z79.891    Long term (current) use of opiate analgesic              Orders:          [id number]   Drug test prsmv qual dir optical obs per day  (In-House)           496 COPD            J44.9    Chronic obstructive pulmonary disease, unspecified    401.1 Hypertension            I10    Essential (primary) hypertension    309.81 Chronic PTSD            F43.12    Post-traumatic stress disorder, chronic

## 2021-05-18 NOTE — PROGRESS NOTES
Meliza Arreola  1960     Office/Outpatient Visit    Visit Date: Fri, Feb 7, 2020 10:44 am    Provider: Ne Palmer MD (Assistant: Linda Carrasco MA)    Location: [facility]        Electronically signed by Ne Palmer MD on  02/07/2020 12:06:52 PM                             Subjective:        CC: Ms. Arreola is a 59 year old White female.  MEDICARE WELLNESS; (CLONAZEPAM, NOT TAKING)        HPI:       She is UTD on colonoscopy, last done 11/2014 and this showed hemorrhoids.  She is UTD on pap smear, last done 2/2019 and this showed NIL.  No HPV testing done d/t Medicare.  She is UTD on mammogram, last done 2/2019.  She is UTD on Pneumovax (11/2019) and flu (11/2019).  She is due for Shingrix and Td.  She is due for routine labs including DM panel.  She is UTD on eye exam (8/2019) and foot exam (8/2019).        She is on hydrocodone/APAP and diclofenac for chronic knee, shoulder, and back pain.  She uses the Norco 2-3 times per day typically.  She has previously tried PT and epidurals both without significant relief.  She is s/p L TKA.  She follows with Dr. Stout.  She does still do her exercises at home.  She is on Lyrica and amitriptyline for diabetic peripheral neuropathy.  The amitriptyline also helps her insomnia.  The Lyrica helps quite a bit with the neuropathic pain in the legs and feet.    Ms. Arreola is here for a Medicare wellness visit.  The required HRA questions are integrated within this visit note. Family medical history and individual medical/surgical history were reviewed and updated.  A current height, weight, BMI, blood pressure, and pulse were recorded in the vitals section of the note and have been reviewed. Patient's medications, including supplements, were recorded in the chart and reviewed.  Current providers and suppliers were reviewed and updated.          Self-Assessment of Health: She rates her health as fair. She rates her confidence of being  able to control/manage most of her health problems as somewhat confident. Her physical/emotional health has limited her social activites quite a bit.  A review of possible cognitive impairment was performed and the following was noted: she is having difficulty driving;  memory changes are noted;  she is not having trouble with her finances A review of functional ability, including bathing, dressing, walking, and urine/bowel continence as well as level of safety was performed and was found to be negative.  Falls Risk: Has not had any falls or only one fall without injury in the past year.  In regard to hearing, she reports having trouble hearing the TV/radio when others do not and having to strain to hear or understand conversations, but not wearing hearing aid(s).  Concerning home safety, she reports that at home she DOES have a skid resistant shower/tub, functioning smoke alarms and absence of throw rugs, but not adequate lighting, grab bars in the bath or handrails on stairs.  Physical Activity: She exercises for at least 20 minutes 3 or more days/week.; Type of diet patient normally eats is described as poor--needs improvement.  Tobacco: She has a past history of cigarette smoking; quit date:  2011.  Preventative Health updated today           PHQ-9 Depression Screening: Completed form scanned and in chart; Total Score 15     ROS:     CONSTITUTIONAL:  Positive for fatigue.   Negative for fever.      EYES:  Negative for blurred vision.      E/N/T:  Negative for diminished hearing, nasal congestion and frequent rhinorrhea.      CARDIOVASCULAR:  Negative for chest pain and palpitations.      RESPIRATORY:  Positive for chronic cough and dyspnea ( with moderate exertion; with coughing spells ).   Negative for recent cough or frequent wheezing.      GASTROINTESTINAL:  Negative for abdominal pain, constipation, diarrhea, nausea and vomiting.      MUSCULOSKELETAL:  Positive for arthralgias, (L shoulder) back pain, joint  stiffness and limb pain ( left upper extremity pain ).   Negative for myalgias.      NEUROLOGICAL:  Positive for headaches and paresthesias.   Negative for weakness.      PSYCHIATRIC:  Positive for anxiety, depression, feelings of stress, anhedonia, difficulty concentrating and insomnia.          Past Medical History / Family History / Social History:         Last Reviewed on 2/07/2020 11:01 AM by Ne Palmer    Past Medical History:                 PAST MEDICAL HISTORY         Hyperlipidemia    Hypertension     COPD    Sleep Apnea     Chronic Pain     Type 2 Diabetes     Depression    Anxiety    PTSD         PREVENTIVE HEALTH MAINTENANCE             BONE DENSITY: has never been done     COLORECTAL CANCER SCREENING: Up to date (colonoscopy q10y; sigmoidoscopy q5y; Cologuard q3y) was last done 11/21/2014, Results are in chart; colonoscopy with the following abnormalities noted-- hemorrhoids; 11/21/14     EYE EXAM: Diabetic Eye Exam during this calendar year and results are in chart was last done 8/30/2019     MAMMOGRAM: Done within last 2 years and results in are chart was last done 2/1/2019 with normal results     PAP SMEAR: was last done 2/19/2019 with normal results NIL (no HPV run d/t Medicare pt)         PAST MEDICAL HISTORY                 ADVANCED DIRECTIVES: None         PAST MEDICAL HISTORY             CURRENT MEDICAL PROVIDERS:    Cardiologist: NINA BIRD    Ophthalmologist: CELIA    Pulmonologist: NAHED         Surgical History:         Cholecystectomy    Joint Replacement: L knee - 11/2015;     Tubal Ligation     L rotator cuff repair - 1/2018;         Family History:         Positive for Coronary Artery Disease ( father; mother ).      Positive for grandparents.          Social History:     Occupation: [employer]     Marital Status:      Children: 2 children         Tobacco/Alcohol/Supplements:     Last Reviewed on 2/07/2020 11:01 AM by Ne Palmer    Tobacco: She has a past history of  cigarette smoking; quit date:  2011.          Substance Abuse History:     Last Reviewed on 2/07/2020 11:01 AM by Ne Palmer        Mental Health History:     Last Reviewed on 2/07/2020 11:01 AM by Ne Palmer        Communicable Diseases (eg STDs):     Last Reviewed on 2/07/2020 11:01 AM by Ne Palmer        Current Problems:     Last Reviewed on 1/18/2020 09:36 AM by Ne Palmer    Type 2 diabetes mellitus with diabetic polyneuropathy    HLD - Mixed hyperlipidemia    Depression - Major depressive disorder, single episode, moderate    PTSD - Post-traumatic stress disorder, chronic    Obstructive sleep apnea (adult) (pediatric)    HTN - Essential (primary) hypertension    COPD - Chronic obstructive pulmonary disease, unspecified    Presence of unspecified artificial knee joint    Pain in left knee    Low back pain    Squamous cell carcinoma of skin of unspecified upper limb, including shoulder    GERD - Gastro-esophageal reflux disease without esophagitis    Rosacea, unspecified    Long term (current) use of opiate analgesic    Actinic keratosis    Other amnesia    Dizziness and giddiness    Unspecified atrial fibrillation    Unspecified chronic gastritis without bleeding    Nausea    Abnormal results of liver function studies    Adverse effect of other primarily systemic and hematological agents, initial encounter    Encounter for other screening for malignant neoplasm of breast        Immunizations:     Pneumococcal conjugate PCV 13 11/25/2019    zzFluzone pf-quadrivalent 3 and up 11/17/2014    zzFluzone pf-quadrivalent 3 and up 11/1/2016    Fluzone Quadrivalent (3+ years) 11/7/2019        Allergies:     Last Reviewed on 1/18/2020 09:36 AM by Ne Palmer    No Known Allergies.        Current Medications:     Last Reviewed on 1/18/2020 09:36 AM by Ne Palmer    Clonazepam 0.5 mg oral tablet [1 bid]    losartan-hydrochlorothiazide 50-12.5 mg oral tablet [1 tab daily]    amitriptyline 75 mg  oral tablet [Take 1 tablet(s) by mouth qHS]    Ventolin HFA 90 mcg/actuation Inhalation HFA Aerosol Inhaler [inhale 2 puffs q 4hrs prn for wheezing/SOA]    Invokana 100 mg oral tablet [TAKE 1 TABLET BY MOUTH ONCE DAILY]    metFORMIN 500 mg oral Tablet, Extended Release 24 hr [TAKE 2 TABLETS BY MOUTH ONCE DAILY AND THEN TAKE 1 ONCE DAILY AT BEDTIME]    atorvastatin 10 mg oral tablet [TAKE 1 TABLET BY MOUTH ONCE DAILY]    HYDROcodone-acetaminophen 7.5-325 mg oral tablet [1 po tid PRN pain]    Spiriva HandiHaler 18mcg/1capsule Capsules for Inhalation [Inhale contents of 1 capsule(s) by inhaler device by mouth daily thru inhaler.]    Hydroxyzine HCl 25 mg oral tablet [1 tab q8h prn anxiety]    Aripiprazole 2 mg oral tablet [1 tab  am]    Lyrica 150 mg oral capsule [take 1 capsule (150 mg) by oral route QHS]    omeprazole 40 mg oral capsule,delayed release (enteric coated) [TAKE 1 CAPSULE BY MOUTH ONCE DAILY]    Topiramate 50 mg oral Capsule, Sprinkle, Extended Release 24 hr Dose Pack [Take 1 cap by mouth daily]    Eliquis 5mg Tablet [Take 1 tablet(s) by mouth bid]    Diltiazem HCl 120 mg oral Capsule, Extended Release 24 hr [Take 1 capsule(s) by mouth daily]    sucralfate 1 gram oral tablet [take 1 tablet (1 gram) by oral route 2 times per day on an empty stomach]    metoprolol succinate 25 mg oral Tablet, Extended Release 24 hr [take 1 tablet (25 mg) by oral route once daily]    promethazine 12.5 mg oral tablet [1 tab BID PRN]        Objective:        Vitals:         Current: 2/7/2020 10:51:45 AM    Ht:  5 ft, 4 in;  Wt: 197.2 lbs;  BMI: 33.8T: 97.4 F (oral);  BP: 139/52 mm Hg (left arm, sitting);  P: 61 bpm (left arm (BP Cuff), sitting);  sCr: 0.76 mg/dL;  GFR: 91.04VA: 20/25 OD, 20/25 OS        Exams:     PHYSICAL EXAM:     GENERAL: vital signs recorded - well developed, well nourished;  well groomed;  no apparent distress;     EYES: lids and lacrimal system are normal in appearance; extraocular movements intact;  conjunctiva and cornea are normal; pupils and irises are normal;     E/N/T: OROPHARYNX:  normal mucosa, dentition, gingiva, and posterior pharynx;     RESPIRATORY: normal respiratory rate and pattern with no distress; normal breath sounds with no rales, rhonchi, wheezes or rubs;     CARDIOVASCULAR: normal rate; rhythm is regular;  no systolic murmur; no edema;     GASTROINTESTINAL: mild epigastric and RUQ tenderness;  no guarding;  no rebound tenderness;  hyperactive bowel sounds; no organomegaly;     MUSCULOSKELETAL: gait: antalgic;  normal overall tone     NEUROLOGIC: mental status: alert and oriented x 3; Reflexes: brachioradialis: 2+; knee jerks: 2+;     PSYCHIATRIC: affect/demeanor: tearful;  normal psychomotor function; normal speech pattern;         Lab/Test Results:         Amphetamines Screen, Urin: Negative (02/07/2020),     BAR-Barbiturates Screen, Urin: Negative (02/07/2020),     Buprenorphine: Negative (02/07/2020),     BZO-Benzodiazepines Screen,Ur: Negative (02/07/2020),     Cocaine(Metab.)Screen, Ur: Negative (02/07/2020),     MDMA-Ecstasy: Negative (02/07/2020),     Met-Methamphetamine: Negative (02/07/2020),     MTD-Methadone Screen, Urine: Negative (02/07/2020),     Opiate Screen, Urine: Positive (02/07/2020),     OXY-Oxycodone: Negative (02/07/2020),     PCP-Phencyclidine Screen, Uri: Negative (02/07/2020),     THC Cannabinoids Screen, Urin: Negative (02/07/2020),     Urine temperature: inadequate specimen to confirm temp (02/07/2020),     Date and time of last pill: Lyrica 02/07/2020 @ 0830, Hydrocodone 02/05/2020/AS (02/07/2020),     Performed by: tls (02/07/2020),     Collection Time: 1155 (02/07/2020),             Assessment:         Z00.00   Encounter for general adult medical examination without abnormal findings       Z13.31   Encounter for screening for depression       M25.562   Pain in left knee       Z79.891   Long term (current) use of opiate analgesic       E11.42   Type 2 diabetes  mellitus with diabetic polyneuropathy           ORDERS:         Meds Prescribed:       [Queued Refill] Ventolin HFA 90 mcg/actuation Inhalation HFA Aerosol Inhaler [inhale 2 puffs q 4hrs prn for wheezing/SOA], #1 (one) each, Refills: 5 (five)         Radiology/Test Orders:       3017F  Colorectal CA screen results documented and reviewed (PV)  (In-House)            2022F  Dilated retinal eye exam w/interpret by ophthalmologist/optometrist documented/reviewed (DM)4  (In-House)              Lab Orders:       41476  DIAB1 - [facility] LIPID,CMP, A1C: 71912, 40203, 52960  (Send-Out)            95599  MOHSEN [facility] Microablbumin, quantitative  (Send-Out)            82823  Drug test prsmv qual dir optical obs per day  (Send-Out)    Patient could not urinate, states she will come back for specimen        APPTO  Appointment need  (In-House)              Procedures Ordered:         Annual wellness visit, includes a PPPS, subsequent visit  (In-House)              Other Orders:         Depression screen positive and follow up plan documented  (In-House)            1101F  Pt screen for fall risk; document no falls in past year or only 1 fall w/o injury in past year (DOUGIE)  (In-House)              Screening mammogram results documented  (Send-Out)            1124F  Advance Care Planning discussed and doc in MR; no surrogate named or advance care plan provided  (Send-Out)                      Plan:         Encounter for general adult medical examination without abnormal findingsShe is UTD on colonoscopy, last done 11/2014 and this showed hemorrhoids.  She is UTD on pap smear, last done 2/2019 and this showed NIL.  No HPV testing done d/t Medicare.  She is UTD on mammogram, last done 2/2019; she already has this scheduled.  She is UTD on Pneumovax (11/2019) and flu (11/2019).  She is due for Shingrix and Td.  She is due for routine labs including DM panel; ordered.  She is UTD on eye exam (8/2019) and foot exam (8/2019).  No  fall risk, no memory issues.  She is currently being treated for depression by Psychiatry.  She lives with her .  She is able to drive and perform ADLs/manage finances independently.  Hearing is adequate.  She does not have a living will.  Preventive services handout and safety handout were given to her.  Current doctor list updated.  RTC 3 months.        She is going for the Watchman device in the near future.    MIPS PHQ-9 Depression Screening: Completed form scanned and in chart; Total Score 15 Positive Depression Screen: Stable on medications. No suicidal ideation.  ADVANCED DIRECTIVES: None         FOLLOW-UP: Schedule a follow-up visit in 3 months.:.  f/u knee pain with [place]          Prescriptions:       [Queued Refill] Ventolin HFA 90 mcg/actuation Inhalation HFA Aerosol Inhaler [inhale 2 puffs q 4hrs prn for wheezing/SOA], #1 (one) each, Refills: 5 (five)           Orders:         Annual wellness visit, includes a PPPS, subsequent visit  (In-House)              Depression screen positive and follow up plan documented  (In-House)            1101F  Pt screen for fall risk; document no falls in past year or only 1 fall w/o injury in past year (DOUGIE)  (In-House)              Screening mammogram results documented  (Send-Out)            3017F  Colorectal CA screen results documented and reviewed (PV)  (In-House)            2022F  Dilated retinal eye exam w/interpret by ophthalmologist/optometrist documented/reviewed (DM)4  (In-House)            1124F  Advance Care Planning discussed and doc in MR; no surrogate named or advance care plan provided  (Send-Out)            APPTO  Appointment need  (In-House)              Pain in left kneeShe is stable on her current regimen.  Pain is well controlled.  No adverse effects.  Now off clonazepam.  She does require ongoing use of this controlled substance to function.  Tox screen and Huber run today.  No refills needed today.  RTC 3 months.         Long term (current) use of opiate analgesic    Controlled substance documentation: Huber reviewed; drug screen performed and appropriate; consent is reviewed and signed and on the chart.  She is aware of risk of addiction on this medication, understands that she will need to follow up for a review every 3 months and her medications will be adjusted or decreased as deemed appropriate at each visit.  No history of drug or alcohol abuse.  No concerns about diversion or abuse. She denies side effects related to the medication.  She is aware that she may be called in for pill counts.  The dosing of this medication will be reviewed on a regular basis and reduced if possible..  Ongoing use of a controlled substance is necessary for this patient to have a normal quality of life           Orders:       08779  Drug test prsmv qual dir optical obs per day  (Send-Out)    Patient could not urinate, states she will come back for specimen          Type 2 diabetes mellitus with diabetic polyneuropathy    LABORATORY:  Labs ordered to be performed today include Diabetes Panel 1; CMP, Lipid, A1C and Microalbumin.            Orders:       95979  DIAB1 - [facility] LIPID,CMP, A1C: 17506, 73687, 13357  (Send-Out)            25857  [facility]  (Send-Out)                  Patient Recommendations:        For  Encounter for general adult medical examination without abnormal findings:    Schedule a follow-up visit in 3 months.                APPOINTMENT INFORMATION:        Monday Tuesday Wednesday Thursday Friday Saturday Sunday            Time:___________________AM  PM   Date:_____________________             Charge Capture:         Primary Diagnosis:     Z00.00  Encounter for general adult medical examination without abnormal findings           Orders:        Annual wellness visit, includes a PPPS, subsequent visit  (In-House)              Depression screen positive and follow up plan documented   (In-House)            1101F  Pt screen for fall risk; document no falls in past year or only 1 fall w/o injury in past year (DOUGIE)  (In-House)            3017F  Colorectal CA screen results documented and reviewed (PV)  (In-House)            2022F  Dilated retinal eye exam w/interpret by ophthalmologist/optometrist documented/reviewed (DM)4  (In-House)            APPTO  Appointment need  (In-House)              Z13.31  Encounter for screening for depression     M25.562  Pain in left knee     Z79.891  Long term (current) use of opiate analgesic     E11.42  Type 2 diabetes mellitus with diabetic polyneuropathy

## 2021-05-18 NOTE — PROGRESS NOTES
Meliza Arreola A  1960     Office/Outpatient Visit    Visit Date: Wed, Feb 17, 2021 01:49 pm    Provider: Ne Palmer MD (Assistant: Porfirio Caballero, )    Location: [facility]        Electronically signed by Ne Palmer MD on  02/19/2021 12:33:39 PM                             Subjective:        CC: Mrs. Arreola is a 60 year old White female.  meds refill;         HPI: Meliza is here today for routine follow-up of chronic issues.  This visit was originally supposed to be an MCW, but had to be rescheduled due to the weather.        She is on hydrocodone/APAP for chronic knee, shoulder, and back pain, using it 2-3x daily.  She gets #75 per month.  No longer using diclofenac since she had to start Eliquis for atrial fibrillation.  She has previously tried PT and epidurals both without significant relief.  She is s/p L TKA and follows with Dr. Stout for generalized OA.  S/p PT and still does her home exercise regimen.  She is on Lyrica for diabetic peripheral neuropathy.  She is also taking amitriptyline through Psych for PTSD.  The Lyrica helps quite a bit with the neuropathic pain in the legs and feet.        She continues to have ongoing abdominal issues for which she is taking omeprazole and sucralfate.  Also has promethazine prn.  She is on omeprazole for GERD and chronic gastritis.  No indigestion or reflux.        She is on glipizide and Invokana for diabetes.  She is on an ACEI and statin.  She is UTD on foot exam (last done 8/2020).  She is due for eye exam, done 8/2019.        She is on losartan/HCTZ and diltiazem for HTN.  BP has been well controlled.  No CP, palpitations.  She is on atorvastatin for cholesterol.  No myalgias.  She is also taking the diltiazem for rate control.  She has atrial fib and has Watchman Device.  No longer on Eliquis.  S/p pacemaker placement for SSS.  She is on ASA/Plavix for 6 months post Watchman Device and then Cardiology told her that she  would be able to stop soon.         She is on Spiriva and Anoro Ellipta for COPD.  She does have some baseline SOB.  She follows with Dr. Mcfarlane.  She has been trying to get a nebulizer so that she can do albuterol treatments at home, as the rescue inhaler is not always effective enough.        She is on topiramate, clonazepam, Abilify, and hydroxyzine prn for chronic PTSD.  She follows with Dr. Romero Psychiatry who manages her meds.   She is no longer taking buspirone or clonazepam.        She has DUNCAN, compliant with BiPAP.  She was just seen by her sleep specialist recently.    ROS:     CONSTITUTIONAL:  Positive for fatigue.   Negative for fever.      EYES:  Negative for blurred vision.      E/N/T:  Negative for diminished hearing, nasal congestion and frequent rhinorrhea.      CARDIOVASCULAR:  Positive for dizziness.   Negative for chest pain or palpitations.      RESPIRATORY:  Positive for chronic cough and dyspnea ( with moderate exertion; with coughing spells ).   Negative for recent cough or frequent wheezing.      GASTROINTESTINAL:  Positive for abdominal pain.   Negative for constipation, diarrhea, nausea or vomiting.      MUSCULOSKELETAL:  Positive for arthralgias, back pain and joint stiffness.   Negative for myalgias.      NEUROLOGICAL:  Positive for headaches and paresthesias.   Negative for weakness.      PSYCHIATRIC:  Positive for anxiety, depression, feelings of stress, anhedonia, difficulty concentrating and insomnia.          Past Medical History / Family History / Social History:         Last Reviewed on 2/17/2021 02:07 PM by Ne Palmer    Past Medical History:                 PAST MEDICAL HISTORY         Hyperlipidemia    Hypertension     COPD    Sleep Apnea     Chronic Pain     Type 2 Diabetes     Depression    Anxiety    PTSD         PREVENTIVE HEALTH MAINTENANCE             BONE DENSITY: has never been done     COLORECTAL CANCER SCREENING: Up to date (colonoscopy q10y; sigmoidoscopy q5y;  Cologuard q3y) was last done 11/21/2014, Results are in chart; colonoscopy with the following abnormalities noted-- hemorrhoids; 11/21/14     EYE EXAM: Diabetic Eye Exam during this calendar year and results are in chart was last done 8/30/2019     MAMMOGRAM: Done within last 2 years and results in are chart was last done 2/28/2020 with normal results     PAP SMEAR: was last done 2/19/2019 with normal results NIL (no HPV run d/t Medicare pt)         PAST MEDICAL HISTORY                 ADVANCED DIRECTIVES: None         PAST MEDICAL HISTORY             CURRENT MEDICAL PROVIDERS:    Cardiologist: NINA BIRD    Ophthalmologist: CELIA    Pulmonologist: NAHED         Surgical History:         Cholecystectomy    Joint Replacement: L knee - 11/2015;     Tubal Ligation     L rotator cuff repair - 1/2018;         Family History:         Positive for Coronary Artery Disease ( father; mother ).      Positive for grandparents.          Social History:     Occupation: [employer]     Marital Status:      Children: 2 children         Tobacco/Alcohol/Supplements:     Last Reviewed on 2/17/2021 02:07 PM by Ne Palmer    Tobacco: She has a past history of cigarette smoking; quit date:  2011.          Substance Abuse History:     Last Reviewed on 2/17/2021 02:07 PM by Ne Palmer        Mental Health History:     Last Reviewed on 2/17/2021 02:07 PM by Ne Palmer        Communicable Diseases (eg STDs):     Last Reviewed on 2/17/2021 02:07 PM by Ne Palmer        Current Problems:     Last Reviewed on 12/03/2020 04:04 PM by Tre Holguin    Type 2 diabetes mellitus with diabetic polyneuropathy    HLD - Mixed hyperlipidemia    Depression - Major depressive disorder, single episode, moderate    PTSD - Post-traumatic stress disorder, chronic    Obstructive sleep apnea (adult) (pediatric)    HTN - Essential (primary) hypertension    COPD - Chronic obstructive pulmonary disease, unspecified    Presence of  unspecified artificial knee joint    Pain in left knee    Low back pain    Squamous cell carcinoma of skin of unspecified upper limb, including shoulder    GERD - Gastro-esophageal reflux disease without esophagitis    Rosacea, unspecified    Long term (current) use of opiate analgesic    Actinic keratosis    Other amnesia    Dizziness and giddiness    Unspecified atrial fibrillation    Unspecified chronic gastritis without bleeding    Nausea    Abnormal results of liver function studies    Muscle weakness (generalized)        Immunizations:     Pneumococcal conjugate PCV 13 11/25/2019    zzFluzone pf-quadrivalent 3 and up 11/17/2014    zzFluzone pf-quadrivalent 3 and up 11/1/2016    Fluzone Quadrivalent (3+ years) 11/7/2019        Allergies:     Last Reviewed on 12/03/2020 04:04 PM by Tre Holguin    metoprolol succinate:   (Adverse Reaction)        Current Medications:     Last Reviewed on 12/03/2020 04:04 PM by Tre Holguin    Anoro Ellipta 62.5-25 mcg/actuation Inhalation Blister, With Inhalation Device [inhale 1 puff by inhalation route once daily at the same time each day]    Vitamin D3 400IU daily     vitamin E 400 unit oral capsule    aspirin 81 mg oral tablet, delayed release (enteric coated) [take 1 tablet (81 mg) by oral route once daily]    atorvastatin 10 mg oral tablet [Take 1 tablet by mouth once daily]    Invokana 100 mg oral tablet [Take 1 tablet by mouth once daily]    losartan-hydrochlorothiazide 50-12.5 mg oral tablet [1 tab daily]    Ventolin HFA 90 mcg/actuation Inhalation HFA Aerosol Inhaler [inhale 2 puffs q 4hrs prn for wheezing/SOA]    HYDROcodone-acetaminophen 7.5-325 mg oral tablet [1 po tid PRN pain]    Hydroxyzine HCl 25 mg oral tablet [1 tab q8h prn anxiety]    pregabalin 150 mg oral capsule [take 1 capsule (150 mg) by oral route 2 times per day]    omeprazole 40 mg oral capsule,delayed release (enteric coated) [Take 1 capsule by mouth once daily]    Diltiazem HCl 120 mg oral  Capsule, Extended Release 24 hr [Take 1 capsule(s) by mouth daily]    OneTouch Ultra Blue Test Strip  [USE 1 STRIP TO CHECK GLUCOSE TWICE DAILY]    sucralfate 1 gram oral tablet [TAKE 1 TABLET BY MOUTH TWICE DAILY ON AN EMPTY STOMACH]    promethazine 12.5 mg oral tablet [Take 1 tablet by mouth twice daily as needed]    nebulizers kit  [use Neb q 6 hours PRN]    glipiZIDE 5 mg oral Tablet, Extended Release 24 hr [Take 1 tablet by mouth twice daily]    METOPROLOL ER 50MG  TAB  [TAKE 1 TABLET BY MOUTH ONCE DAILY]    CLOPIDOGREL  TAB 75MG         Objective:        Vitals:         Current: 2/17/2021 2:00:17 PM    Ht:  5 ft, 4 in;  Wt: 204.4 lbs;  BMI: 35.1T: 98.5 F (temporal);  BP: 88/52 mm Hg (right arm, sitting);  P: 61 bpm (right arm (BP Cuff), sitting);  sCr: 1.06 mg/dL;  GFR: 65.50        Exams:     PHYSICAL EXAM:     GENERAL: vital signs recorded - well developed, well nourished;  well groomed;  no apparent distress;     EYES: lids and lacrimal system are normal in appearance; extraocular movements intact; conjunctiva and cornea are normal; pupils and irises are normal;     RESPIRATORY: normal respiratory rate and pattern with no distress; normal breath sounds with no rales, rhonchi, wheezes or rubs;     CARDIOVASCULAR: normal rate; rhythm is regular;  no systolic murmur; no edema;     GASTROINTESTINAL: nontender; normal bowel sounds;     MUSCULOSKELETAL: gait: antalgic;  normal overall tone     PSYCHIATRIC: appropriate affect and demeanor; normal psychomotor function; normal speech pattern;         Lab/Test Results:         Amphetamines Screen, Urin: Negative (02/17/2021),     BAR-Barbiturates Screen, Urin: Negative (02/17/2021),     Buprenorphine: Negative (02/17/2021),     BZO-Benzodiazepines Screen,Ur: Negative (02/17/2021),     Cocaine(Metab.)Screen, Ur: Negative (02/17/2021),     MDMA-Ecstasy: Negative (02/17/2021),     Met-Methamphetamine: Negative (02/17/2021),     MTD-Methadone Screen, Urine: Negative  (02/17/2021),     Opiate Screen, Urine: Positive (02/17/2021),     OXY-Oxycodone: Negative (02/17/2021),     PCP-Phencyclidine Screen, Uri: Negative (02/17/2021),     THC Cannabinoids Screen, Urin: Negative (02/17/2021),     Urine temperature: confirmed (02/17/2021),     Date and time of last pill: Hydrocodone and Lyrica 02/17/2021 @ 0900/AS (02/17/2021),     Performed by: pr (02/17/2021),     Collection Time: 14:58 (02/17/2021),             Assessment:         G89.4   Chronic pain syndrome       Z79.891   Long term (current) use of opiate analgesic       E11.42   Type 2 diabetes mellitus with diabetic polyneuropathy       I10   HTN - Essential (primary) hypertension       E78.2   HLD - Mixed hyperlipidemia       I48.91   Unspecified atrial fibrillation       F32.1   Depression - Major depressive disorder, single episode, moderate       F43.12   PTSD - Post-traumatic stress disorder, chronic       J44.9   COPD - Chronic obstructive pulmonary disease, unspecified       K21.9   GERD - Gastro-esophageal reflux disease without esophagitis       A49.02   Methicillin resistant Staphylococcus aureus infection, unspecified site       G47.33   Obstructive sleep apnea (adult) (pediatric)       Z13.31   Encounter for screening for depression           ORDERS:         Meds Prescribed:       [New Rx] mupirocin 2 % Topical Ointment [apply a small amount to the affected area by topical route 2 times per day], #22 (twenty two) grams, Refills: 0 (zero)       [Refilled] HYDROcodone-acetaminophen 7.5-325 mg oral tablet [1 po tid PRN pain], #75 (seventy five) tablets, Refills: 0 (zero)         Radiology/Test Orders:       3017F  Colorectal CA screen results documented and reviewed (PV)  (In-House)              Lab Orders:       33213  DIAB1 - H LIPID,CMP, A1C: 33134, 93571, 79809  (Send-Out)            63632  MOHSEN - [facility] Microablbumin, quantitative  (Send-Out)            34024  Drug test prsmv qual dir optical obs per day  (In-House)             APPTO  Appointment need  (In-House)              Other Orders:         Screening mammogram results documented  (Send-Out)              Depression screen positive and follow up plan documented  (In-House)                      Plan:         Chronic pain syndromeStable on current regimen.  Sx well controlled.  No adverse effects.  She does require ongoing use of this controlled substance to function.  Tox screen and Huber run today.  Refills needed.  RTC 3 months.    MIPS PHQ-9 Depression Screening: Completed form scanned and in chart; Total Score 19 Positive Depression Screen: Suicide Risk Assessment completed--denies suicidal/homicidal ideation; follows with Psychiatry; Stable on medications. No suicidal ideation.      FOLLOW-UP: Schedule a follow-up visit in 3 months.:.  Medicare wellness 30 min with Spencer          Prescriptions:       [Refilled] HYDROcodone-acetaminophen 7.5-325 mg oral tablet [1 po tid PRN pain], #75 (seventy five) tablets, Refills: 0 (zero)           Orders:         Screening mammogram results documented  (Send-Out)            3017F  Colorectal CA screen results documented and reviewed (PV)  (In-House)            APPTO  Appointment need  (In-House)              Depression screen positive and follow up plan documented  (In-House)              Long term (current) use of opiate analgesic    Controlled substance documentation: Huber reviewed; drug screen performed and appropriate; consent is reviewed and signed and on the chart.  She is aware of risk of addiction on this medication, understands that she will need to follow up for a review every 3 months and her medications will be adjusted or decreased as deemed appropriate at each visit.  No history of drug or alcohol abuse.  No concerns about diversion or abuse. She denies side effects related to the medication.  She is aware that she may be called in for pill counts.  The dosing of this medication will be  reviewed on a regular basis and reduced if possible..  Ongoing use of a controlled substance is necessary for this patient to have a normal quality of life           Orders:       99261  Drug test prsmv qual dir optical obs per day  (In-House)              Type 2 diabetes mellitus with diabetic polyneuropathyShe is on glipizide and Invokana for diabetes.  No refills needed.  She is on an ACEI and statin.  She is UTD on foot exam (last done 8/2020).  She is due for eye exam, done 8/2019.      LABORATORY:  Labs ordered to be performed today include Diabetes Panel 1; CMP, Lipid, A1C and Microalbumin.            Orders:       39770  DIAB1 - [facility] LIPID,CMP, A1C: 41956, 77412, 79433  (Send-Out)            15328  MOHSEN - [facility] Microablbumin, quantitative  (Send-Out)              HTN - Essential (primary) hypertensionBP at goal.  No refills needed.  Checking labs.        HLD - Mixed hyperlipidemiaStable.  No refills needed.  Checking labs.        Unspecified atrial fibrillationStable.  No refills needed.  Checking labs.  Follows with Cardiology.  S/p ppacemaker and Watchman device.        Depression - Major depressive disorder, single episode, moderateFollows with Dr. Romero Psych.  No longer on clonazepam.        PTSD - Post-traumatic stress disorder, chronicAs above.        COPD - Chronic obstructive pulmonary disease, unspecifiedFollows with Pulm.  On Anoro Ellipta and Spiriva.  No refills needed.  We are trying to get her a nebulizer for albuterol to use at home, as her inhalers are not always effective enough at helping with her baseline dyspnea.        GERD - Gastro-esophageal reflux disease without esophagitisStable.  Follows with GI.        Methicillin resistant Staphylococcus aureus infection, unspecified site          Prescriptions:       [New Rx] mupirocin 2 % Topical Ointment [apply a small amount to the affected area by topical route 2 times per day], #22 (twenty two) grams, Refills: 0 (zero)         Obstructive  sleep apnea (adult) (pediatric)Compliant with CPAP.            Patient Recommendations:        For  Chronic pain syndrome:    Schedule a follow-up visit in 3 months.                APPOINTMENT INFORMATION:        Monday Tuesday Wednesday Thursday Friday Saturday Sunday            Time:___________________AM  PM   Date:_____________________             Charge Capture:         Primary Diagnosis:     G89.4  Chronic pain syndrome           Orders:      13672  Office/outpatient visit; established patient, level 4  (In-House)            3017F  Colorectal CA screen results documented and reviewed (PV)  (In-House)            APPTO  Appointment need  (In-House)              Depression screen positive and follow up plan documented  (In-House)              Z79.891  Long term (current) use of opiate analgesic           Orders:      81931  Drug test prsmv qual dir optical obs per day  (In-House)              E11.42  Type 2 diabetes mellitus with diabetic polyneuropathy     I10  HTN - Essential (primary) hypertension     E78.2  HLD - Mixed hyperlipidemia     I48.91  Unspecified atrial fibrillation     F32.1  Depression - Major depressive disorder, single episode, moderate     F43.12  PTSD - Post-traumatic stress disorder, chronic     J44.9  COPD - Chronic obstructive pulmonary disease, unspecified     K21.9  GERD - Gastro-esophageal reflux disease without esophagitis     A49.02  Methicillin resistant Staphylococcus aureus infection, unspecified site     G47.33  Obstructive sleep apnea (adult) (pediatric)     Z13.31  Encounter for screening for depression         ADDENDUMS:      ____________________________________    Addendum: 03/09/2021 11:20 AM - Ne Pamler        Addendum to DM plan:  Meliza has documented peripheral neuropathy with callous formation.  Order placed for diabetic shoes.  Paperwork completed.  Monik, ZIA

## 2021-05-18 NOTE — PROGRESS NOTES
Meliza Arreola  1960     Office/Outpatient Visit    Visit Date: Tue, Jan 7, 2020 03:56 pm    Provider: Oumou Hernández N.P. (Assistant: Jahaira Siegel MA)    Location: [facility]        Electronically signed by Oumou Hernández N.P. on  01/07/2020 06:24:07 PM                             Subjective:        CC: Ms. Arreola is a 59 year old White female.  stomach pain/tighness, been going on since before christmas; PT STATES SHE ISNT TAKING ASPIRIN WENT OVER MEED LIST PT HAD BUT PT DOESNT KNOW WHAT SHE IS AND ISNT TAKING        HPI:           Ms. Arreola presents with nausea.  The location of the discomfort is primarily epigastric, right upper quadrant, and left upper quadrant.  She describes the pain as aching, burning, cramping, pressure-like, and sharp.  It is severe in intensity.  It began 3 weeks ago.  The current symptoms have been present for 3 weeks.  Symptoms lasts hours.  She estimates that the frequency of pain is nearly hourly.  Associated symptoms include anorexia, melena, bloating,,  diarrhea and nausea.  Symptoms are improved with pt states no relief.  Aggravating factors include eating in general.        pt currently on omeprazole 40mg,     ROS:     CONSTITUTIONAL:  Negative for unintentional weight gain and unintentional weight loss.      CARDIOVASCULAR:  Negative for chest pain, orthopnea, paroxysmal nocturnal dyspnea and pedal edema.      RESPIRATORY:  Negative for recent cough and dyspnea.      GASTROINTESTINAL:  Positive for abdominal pain, acid reflux symptoms, anorexia, abdominal bloating, dysphagia, diarrhea, heartburn, nausea, change in stool caliber and black tarry stool.   Negative for constipation or vomiting.      PSYCHIATRIC:  Positive for anxiety.          Past Medical History / Family History / Social History:         Last Reviewed on 11/07/2019 11:56 AM by Ne Palmer    Past Medical History:                 PAST MEDICAL HISTORY          Hyperlipidemia    Hypertension     COPD    Sleep Apnea     Chronic Pain     Type 2 Diabetes     Depression    Anxiety    PTSD         PREVENTIVE HEALTH MAINTENANCE             COLORECTAL CANCER SCREENING: Up to date (colonoscopy q10y; sigmoidoscopy q5y; Cologuard q3y) was last done 11/21/2014, Results are in chart; colonoscopy with the following abnormalities noted-- hemorrhoids     EYE EXAM: Diabetic Eye Exam during this calendar year and results are in chart was last done 8/30/2019     MAMMOGRAM: Done within last 2 years and results in are chart was last done 2/1/2019 with normal results     PAP SMEAR: was last done 2/19/2019 with normal results NIL (no HPV run d/t Medicare pt)         PAST MEDICAL HISTORY                 ADVANCED DIRECTIVES: None         Surgical History:         Cholecystectomy    Joint Replacement: L knee - 11/2015;     Tubal Ligation     L rotator cuff repair - 1/2018;         Family History:         Positive for Coronary Artery Disease ( father; mother ).      Positive for grandparents.          Social History:     Occupation: [employer]     Marital Status:      Children: 2 children         Tobacco/Alcohol/Supplements:     Last Reviewed on 1/07/2020 03:57 PM by Jahaira Siegel    Tobacco: She has a past history of cigarette smoking; quit date:  2011.          Substance Abuse History:     Last Reviewed on 11/07/2019 11:56 AM by Ne Palmer        Mental Health History:     Last Reviewed on 11/07/2019 11:56 AM by Ne Palmer        Communicable Diseases (eg STDs):     Last Reviewed on 11/07/2019 11:56 AM by Ne Palmer        Current Problems:     Last Reviewed on 11/07/2019 11:56 AM by Ne Palmer    Type 2 diabetes mellitus with diabetic polyneuropathy    HLD - Mixed hyperlipidemia    Depression - Major depressive disorder, single episode, moderate    PTSD - Post-traumatic stress disorder, chronic    Obstructive sleep apnea (adult) (pediatric)    HTN - Essential (primary)  hypertension    COPD - Chronic obstructive pulmonary disease, unspecified    Presence of unspecified artificial knee joint    Pain in left knee    Low back pain    Squamous cell carcinoma of skin of unspecified upper limb, including shoulder    GERD - Gastro-esophageal reflux disease without esophagitis    Rosacea, unspecified    Long term (current) use of opiate analgesic    Actinic keratosis    Other amnesia    Dizziness and giddiness    Unspecified atrial fibrillation    Unspecified chronic gastritis without bleeding    Nausea        Immunizations:     Pneumococcal conjugate PCV 13 11/25/2019    zzFluzone pf-quadrivalent 3 and up 11/17/2014    zzFluzone pf-quadrivalent 3 and up 11/1/2016    Fluzone Quadrivalent (3+ years) 11/7/2019        Allergies:     Last Reviewed on 1/07/2020 03:57 PM by Jahaira Siegel    No Known Allergies.        Current Medications:     Last Reviewed on 1/07/2020 03:57 PM by Jahaira Siegel    amitriptyline 75 mg oral tablet [Take 1 tablet(s) by mouth qHS]    Clonazepam 0.5 mg oral tablet [1 bid]    losartan-hydrochlorothiazide 50-12.5 mg oral tablet [1 tab daily]    Ventolin HFA 90 mcg/actuation Inhalation HFA Aerosol Inhaler [inhale 2 puffs q 4hrs prn for wheezing/SOA]    Invokana 100 mg oral tablet [TAKE 1 TABLET BY MOUTH ONCE DAILY]    metFORMIN 500 mg oral Tablet, Extended Release 24 hr [TAKE 2 TABLETS BY MOUTH ONCE DAILY AND THEN TAKE 1 ONCE DAILY AT BEDTIME]    HYDROcodone-acetaminophen 7.5-325 mg oral tablet [1 po tid PRN pain]    atorvastatin 10 mg oral tablet [TAKE 1 TABLET BY MOUTH ONCE DAILY]    Spiriva HandiHaler 18mcg/1capsule Capsules for Inhalation [Inhale contents of 1 capsule(s) by inhaler device by mouth daily thru inhaler.]    Hydroxyzine HCl 25 mg oral tablet [1 tab q8h prn anxiety]    Aripiprazole 2 mg oral tablet [1 tab  am]    Lyrica 150 mg oral capsule [take 1 capsule (150 mg) by oral route QHS]    omeprazole 40 mg oral capsule,delayed release (enteric coated) [TAKE 1  CAPSULE BY MOUTH ONCE DAILY]    Topiramate 50 mg oral Capsule, Sprinkle, Extended Release 24 hr Dose Pack [Take 1 cap by mouth daily]    Eliquis 5mg Tablet [Take 1 tablet(s) by mouth bid]    amiodarone 200 mg oral tablet [1 tab bid]    Diltiazem HCl 120 mg oral Capsule, Extended Release 24 hr [Take 1 capsule(s) by mouth daily]    One Touch Ultra Blue Test Strips  Reagent Strips [check blood sugar bid  DX E11.9]        Objective:        Vitals:         Current: 1/7/2020 4:05:19 PM    Ht:  5 ft, 4 in;  Wt: 197.8 lbs;  BMI: 34.0T: 98.2 F (oral);  BP: 152/62 mm Hg (left arm, sitting);  P: 61 bpm (left arm (BP Cuff), sitting);  sCr: 0.85 mg/dL;  GFR: 81.50        Exams:     PHYSICAL EXAM:     GENERAL: vital signs recorded - well developed, well nourished;  well groomed;  no apparent distress, anxious, seems to be in moderate pain;     RESPIRATORY: normal respiratory rate and pattern with no distress; normal breath sounds with no rales, rhonchi, wheezes or rubs;     CARDIOVASCULAR: normal rate; rhythm is regular;  no systolic murmur; no edema;     GASTROINTESTINAL: moderate epigastric, LUQ, and RUQ pain;  hyperactive bowel sounds; no organomegaly;     PSYCHIATRIC:  appropriate affect and demeanor; normal speech pattern; grossly normal memory;         Assessment:         K29.50   Unspecified chronic gastritis without bleeding       R11.0   Nausea       K21.9   GERD - Gastro-esophageal reflux disease without esophagitis           ORDERS:         Meds Prescribed:       [New Rx] sucralfate 1 gram oral tablet [take 1 tablet (1 gram) by oral route 2 times per day on an empty stomach], #28 (twenty eight) tablets, Refills: 0 (zero)         Lab Orders:       [id number]  BDCB2 - [facility] CBC w/o diff  (Send-Out)            [id number]  HPUBT - [facility] H.pylori Breath test  (Send-Out)            41849  MG - H Magnesium, Serum  (Send-Out)            [id number]  COMP - [facility] Comp. Metabolic Panel  (Send-Out)                      Plan:         Unspecified  chronic gastritis without bleeding    LABORATORY:  Labs ordered to be performed today include CBC W/O DIFF, Comprehensive metabolic panel, H. pylori breath test, and Magnesium level.            Prescriptions:       [New Rx] sucralfate 1 gram oral tablet [take 1 tablet (1 gram) by oral route 2 times per day on an empty stomach], #28 (twenty eight) tablets, Refills: 0 (zero)           Orders:       [id number]  BDCB2 - H CBC w/o diff  (Send-Out)            [id number]  HPUBT - [facility] H.pylori Breath test  (Send-Out)            47188  MG - [facility] Magnesium, Serum  (Send-Out)            21171  COMP - H Comp. Metabolic Panel  (Send-Out)              NauseaIf nausea persist after starting the carafate notify the clinic        GERD - Gastro-esophageal reflux disease without esophagitiscontinue the omeprazole 40mg daily.             Charge Capture:         Primary Diagnosis:     K29.50  Unspecified chronic gastritis without bleeding           Orders:      [id number]  Office/outpatient visit; established patient, level 4  (In-House)              R11.0  Nausea     K21.9  GERD - Gastro-esophageal reflux disease without esophagitis

## 2021-05-18 NOTE — PROGRESS NOTES
Mayuri Arreola  1960     Office/Outpatient Visit    Visit Date: Tue, Aug 11, 2020 10:11 am    Provider: Ne Palmer MD (Assistant: Radha Bates, )    Location: [facility]        Electronically signed by Ne Palmer MD on  08/11/2020 11:16:11 AM                             Subjective:        CC: Ms. Arreola is a 59 year old White female.  3 month follow up, back pain;         HPI: Meliza is here today for routine f/u of chronic issues.         She is on hydrocodone/APAP for chronic knee, shoulder, and back pain, using it 2-3x daily.  No longer using diclofenac since she had to start Eliquis for atrial fibrillation.  She has previously tried PT and epidurals both without significant relief.  She is s/p L TKA.  She follows with Dr. Stout.  S/p PT and still does her home exercise regimen.  She is on Lyrica for diabetic peripheral neuropathy.  also taking amitriptyline through Psych for PTSD.  The Lyrica helps quite a bit with the neuropathic pain in the legs and feet.  She is down to just 150 mg at bedtime of the Lyrica. She is having a lot of pain in the hands and feet.  She is going for EMG/NCVs.          She continues to have ongoing abdominal issues for which she is taking omeprazole and sucralfate.  Also has promethazine prn.  However, she would like to get in with the promethazine and sucralfate.  She is getting back in with GI, which was interrupted when the pandemic hit.  Has not heard back from them yet.  She is on omeprazole for GERD and chronic gastritis.  No heartburn or reflux.        She is on glipizide and Invokana for diabetes.  She is on an ACEI and statin.  She is due for foot exam (last done 8/2019).  She is UTD on eye exam, done 8/2019.        She is on losartan/HCTZ and diltiazem for HTN.  BP has been well controlled.  No CP, palpitations.  She is on atorvastatin for HLD.  No myalgias.  Also taking the diltiazem for rate control.  She has atrial fib and is now on  Eliquis.  S/p pacemaker placement for SSS.  She is going for Watchman device placement on 8/19/20.        She is on Anoro Ellipta and Spiriva for COPD.  She does have some baseline SOB.  She follows with Dr. Mcfarlane.        She is on topiramate, clonazepam, Abilify, and hydroxyzine prn for chronic PTSD.  She follows with Dr. Romero Psychiatry who manages her meds.   She is no longer taking buspirone and the psychiatrist is also weaning her off of the clonazepam.        She has DUNCAN, compliant with BiPAP.  She was just seen by her sleep specialist recently.    ROS:     CONSTITUTIONAL:  Positive for fatigue.   Negative for fever.      EYES:  Negative for blurred vision.      E/N/T:  Negative for diminished hearing, nasal congestion and frequent rhinorrhea.      CARDIOVASCULAR:  Negative for chest pain and palpitations.      RESPIRATORY:  Positive for chronic cough and dyspnea ( with moderate exertion; with coughing spells ).   Negative for recent cough or frequent wheezing.      GASTROINTESTINAL:  Negative for abdominal pain, constipation, diarrhea, nausea and vomiting.      MUSCULOSKELETAL:  Positive for arthralgias, (L shoulder) back pain, joint stiffness and limb pain ( left upper extremity pain ).   Negative for myalgias.      NEUROLOGICAL:  Positive for headaches and paresthesias.   Negative for weakness.      PSYCHIATRIC:  Positive for anxiety, depression, feelings of stress, anhedonia, difficulty concentrating and insomnia.          Past Medical History / Family History / Social History:         Last Reviewed on 8/11/2020 10:25 AM by Ne Palmer    Past Medical History:                 PAST MEDICAL HISTORY         Hyperlipidemia    Hypertension     COPD    Sleep Apnea     Chronic Pain     Type 2 Diabetes     Depression    Anxiety    PTSD         PREVENTIVE HEALTH MAINTENANCE             BONE DENSITY: has never been done     COLORECTAL CANCER SCREENING: Up to date (colonoscopy q10y; sigmoidoscopy q5y;  Cologuard q3y) was last done 11/21/2014, Results are in chart; colonoscopy with the following abnormalities noted-- hemorrhoids; 11/21/14     EYE EXAM: Diabetic Eye Exam during this calendar year and results are in chart was last done 8/30/2019     MAMMOGRAM: Done within last 2 years and results in are chart was last done 2/28/2020 with normal results     PAP SMEAR: was last done 2/19/2019 with normal results NIL (no HPV run d/t Medicare pt)         PAST MEDICAL HISTORY                 ADVANCED DIRECTIVES: None         PAST MEDICAL HISTORY             CURRENT MEDICAL PROVIDERS:    Cardiologist: NINA BIRD    Ophthalmologist: CELIA    Pulmonologist: NAHED         Surgical History:         Cholecystectomy    Joint Replacement: L knee - 11/2015;     Tubal Ligation     L rotator cuff repair - 1/2018;         Family History:         Positive for Coronary Artery Disease ( father; mother ).      Positive for grandparents.          Social History:     Occupation: [employer]     Marital Status:      Children: 2 children         Tobacco/Alcohol/Supplements:     Last Reviewed on 8/11/2020 10:25 AM by Ne Palmer    Tobacco: She has a past history of cigarette smoking; quit date:  2011.          Substance Abuse History:     Last Reviewed on 8/11/2020 10:25 AM by Ne Palmer        Mental Health History:     Last Reviewed on 8/11/2020 10:25 AM by Ne Palmer        Communicable Diseases (eg STDs):     Last Reviewed on 8/11/2020 10:25 AM by Ne Palmer        Current Problems:     Last Reviewed on 5/06/2020 04:01 PM by Ne Palmer    Type 2 diabetes mellitus with diabetic polyneuropathy    HLD - Mixed hyperlipidemia    Depression - Major depressive disorder, single episode, moderate    PTSD - Post-traumatic stress disorder, chronic    Obstructive sleep apnea (adult) (pediatric)    HTN - Essential (primary) hypertension    COPD - Chronic obstructive pulmonary disease, unspecified    Presence of  unspecified artificial knee joint    Pain in left knee    Low back pain    Squamous cell carcinoma of skin of unspecified upper limb, including shoulder    GERD - Gastro-esophageal reflux disease without esophagitis    Rosacea, unspecified    Long term (current) use of opiate analgesic    Actinic keratosis    Other amnesia    Dizziness and giddiness    Unspecified atrial fibrillation    Unspecified chronic gastritis without bleeding    Nausea    Abnormal results of liver function studies        Immunizations:     Pneumococcal conjugate PCV 13 11/25/2019    zzFluzone pf-quadrivalent 3 and up 11/17/2014    zzFluzone pf-quadrivalent 3 and up 11/1/2016    Fluzone Quadrivalent (3+ years) 11/7/2019        Allergies:     Last Reviewed on 5/06/2020 04:01 PM by Ne Palmer    metoprolol succinate:   (Adverse Reaction)        Current Medications:     Last Reviewed on 5/06/2020 04:01 PM by Ne Palmer    Anoro Ellipta 62.5-25 mcg/actuation Inhalation Blister, With Inhalation Device [inhale 1 puff by inhalation route once daily at the same time each day]    amitriptyline 75 mg oral tablet [Take 1 tablet(s) by mouth qHS]    losartan-hydrochlorothiazide 50-12.5 mg oral tablet [1 tab daily]    Ventolin HFA 90 mcg/actuation Inhalation HFA Aerosol Inhaler [inhale 2 puffs q 4hrs prn for wheezing/SOA]    Invokana 100 mg oral tablet [Take 1 tablet by mouth once daily]    atorvastatin 10 mg oral tablet [Take 1 tablet by mouth once daily]    HYDROcodone-acetaminophen 7.5-325 mg oral tablet [1 po tid PRN pain]    Spiriva HandiHaler 18mcg/1capsule Capsules for Inhalation [Inhale contents of 1 capsule(s) by inhaler device by mouth daily thru inhaler.]    Hydroxyzine HCl 25 mg oral tablet [1 tab q8h prn anxiety]    Aripiprazole 2 mg oral tablet [1 tab  am]    Lyrica 150 mg oral capsule [TAKE 1 CAPSULE BY MOUTH ONCE DAILY AT BEDTIME]    omeprazole 40 mg oral capsule,delayed release (enteric coated) [Take 1 capsule by mouth once  daily]    Topiramate 50 mg oral Capsule, Sprinkle, Extended Release 24 hr Dose Pack [Take 1 cap by mouth daily]    Eliquis 5 mg oral tablet [Take 1 tablet by mouth twice daily]    Diltiazem HCl 120 mg oral Capsule, Extended Release 24 hr [Take 1 capsule(s) by mouth daily]    OneTouch Ultra Blue Test Strip  [USE 1 STRIP TO CHECK GLUCOSE TWICE DAILY]    sucralfate 1 gram oral tablet [TAKE 1 TABLET BY MOUTH TWICE DAILY ON AN EMPTY STOMACH]    promethazine 12.5 mg oral tablet [Take 1 tablet by mouth twice daily as needed]    nebulizers kit  [use Neb q 6 hours PRN]    glipiZIDE 5 mg oral Tablet, Extended Release 24 hr [take 1 tablet BID]        Objective:        Vitals:         Current: 8/11/2020 10:14:48 AM    Ht:  5 ft, 4 in;  Wt: 201.8 lbs;  BMI: 34.6T: 97.5 F (oral);  BP: 110/32 mm Hg (left arm, sitting);  P: 60 bpm (left arm (BP Cuff), sitting);  sCr: 0.87 mg/dL;  GFR: 80.31        Repeat:     10:18:15 AM  BP:   84/60mm Hg (right arm, sitting) 10:26:42 AM  BP:   122/74mm Hg (left arm, sitting, *** MANUAL***) 10:18:29 AM  P:   59bpm (right arm (BP Cuff), sitting)     Exams:     PHYSICAL EXAM:     GENERAL: vital signs recorded - well developed, well nourished;  well groomed;  no apparent distress;     EYES: lids and lacrimal system are normal in appearance; extraocular movements intact; conjunctiva and cornea are normal; pupils and irises are normal;     E/N/T: OROPHARYNX:  normal mucosa, dentition, gingiva, and posterior pharynx;     RESPIRATORY: normal respiratory rate and pattern with no distress; normal breath sounds with no rales, rhonchi, wheezes or rubs;     CARDIOVASCULAR: normal rate; rhythm is regular;  no systolic murmur; no edema;     GASTROINTESTINAL: mild epigastric and RUQ tenderness;  no guarding;  no rebound tenderness;  normal bowel sounds;     MUSCULOSKELETAL: gait: antalgic;  normal overall tone     NEUROLOGIC: mental status: alert and oriented x 3; Reflexes: brachioradialis: 2+; knee jerks: 2+;      PSYCHIATRIC: appropriate affect and demeanor; normal psychomotor function; normal speech pattern;     Left foot exam    Protective sensation using Monofilament test: Slightly decreased, with estimated force of 0.2 grams applied.    Vascular status: normal peripheral vascular exam with palpable dorsal pedal and posterior tibal pulses and brisk digital capillary refill    Skin is intact without sores or ulcers    Right foot exam    Protective sensation using Monofilament test: Decreased, with estimated force of 2 grams applied.    Vascular status: normal peripheral vascular exam with palpable dorsal pedal and posterior tibal pulses and brisk digital capillary refill    Skin is intact without sores or ulcers         Lab/Test Results:         Amphetamines Screen, Urin: Negative (08/11/2020),     BAR-Barbiturates Screen, Urin: Negative (08/11/2020),     Buprenorphine: Negative (08/11/2020),     BZO-Benzodiazepines Screen,Ur: Negative (08/11/2020),     Cocaine(Metab.)Screen, Ur: Negative (08/11/2020),     MDMA-Ecstasy: Negative (08/11/2020),     Met-Methamphetamine: Negative (08/11/2020),     MTD-Methadone Screen, Urine: Negative (08/11/2020),     Opiate Screen, Urine: Positive (08/11/2020),     OXY-Oxycodone: Negative (08/11/2020),     PCP-Phencyclidine Screen, Uri: Negative (08/11/2020),     THC Cannabinoids Screen, Urin: Negative (08/11/2020),     Urine temperature: confirmed (08/11/2020),     Date and time of last pill: Hydrocodone, Lyrica 8/11/20@830am./atc (08/11/2020),     Performed by: pr (08/11/2020),     Collection Time: 11:05 (08/11/2020),             Assessment:         M54.5   Low back pain       Z79.891   Long term (current) use of opiate analgesic       E11.42   Type 2 diabetes mellitus with diabetic polyneuropathy       I10   HTN - Essential (primary) hypertension       E78.2   HLD - Mixed hyperlipidemia       I48.91   Unspecified atrial fibrillation       F43.12   PTSD - Post-traumatic stress disorder,  chronic       J44.9   COPD - Chronic obstructive pulmonary disease, unspecified       K29.50   Unspecified chronic gastritis without bleeding       K21.9   GERD - Gastro-esophageal reflux disease without esophagitis       G47.33   Obstructive sleep apnea (adult) (pediatric)           ORDERS:         Radiology/Test Orders:       3017F  Colorectal CA screen results documented and reviewed (PV)  (In-House)            2022F  Dilated retinal eye exam w/interpret by ophthalmologist/optometrist documented/reviewed (DM)4  (In-House)              Lab Orders:       07438  Drug test prsmv qual dir optical obs per day  (In-House)            [address]010  DIAB[address] [place] [address] LIPID,CMP, A1C: 89474, 38332, 69929  (Send-Out)            APPTO  Appointment need  (In-House)              Other Orders:         Screening mammogram results documented  (Send-Out)            2028F  Foot examination performed (includes examination through visual inspection, sensory exam with monofilament, and pulse exam - report when any of the three components are completed) (DM)4  (In-House)                      Plan:         Low back painStable on current regimen.  She does still have a fair number of problems from her diabetic peripheral neuropathy.  Checking EMG/NCVs for that.  No adverse effects.  She does require ongoing use of these controlled substances to function.  Tox screen and Huber run today.  No refills needed.  Repeat tox tickled for 3 months as I will be on maternity leave then, and I will see her once I get back.      MIPS Vaccines Flu and Pneumonia updated in Shot record Screening mammomgram done within last 2 years and results in are chart Colorectal Cancer Screening is up to date and the results are in the chart Diabetic Eye Exam during this calendar year and results are in chart     FOLLOW-UP: Schedule a follow-up visit in 6 months.:.  f/u chronic pain with  [place]          Orders:         Screening mammogram results documented   (Send-Out)            3017F  Colorectal CA screen results documented and reviewed (PV)  (In-House)            2022F  Dilated retinal eye exam w/interpret by ophthalmologist/optometrist documented/reviewed (DM)4  (In-House)            APPTO  Appointment need  (In-House)              Long term (current) use of opiate analgesic    Controlled substance documentation: Huber reviewed; drug screen performed and appropriate; consent is reviewed and signed and on the chart.  She is aware of risk of addiction on this medication, understands that she will need to follow up for a review every 3 months and her medications will be adjusted or decreased as deemed appropriate at each visit.  No history of drug or alcohol abuse.  No concerns about diversion or abuse. She denies side effects related to the medication.  She is aware that she may be called in for pill counts.  The dosing of this medication will be reviewed on a regular basis and reduced if possible..  Ongoing use of a controlled substance is necessary for this patient to have a normal quality of life           Orders:       13077  Drug test prsmv qual dir optical obs per day  (In-House)              Type 2 diabetes mellitus with diabetic polyneuropathyShe is on glipizide and Invokana for diabetes.  No refills needed.  She is on an ACEI and statin.  She is due for foot exam (last done 8/2019); foot exam today shows decreased sensation.  She is UTD on eye exam, done 8/2019; deferred today due to COVID (she is nervous about going in right now).  Checking labs.    LABORATORY:  Labs ordered to be performed today include Diabetes Panel 1; CMP, Lipid, A1C.            Orders:       88013  DIAB - [facility] LIPID,CMP, A1C: 51942, 56402, 18342  (Send-Out)              HTN - Essential (primary) hypertensionStable.  No refills needed.  Checking labs.        HLD - Mixed hyperlipidemiaStable.  No refills needed.  Checking labs.        Unspecified atrial fibrillationFollows with   Renato.  Going for Watchman device next week.        PTSD - Post-traumatic stress disorder, chronicStable.  Follows with Dr. Romero.        COPD - Chronic obstructive pulmonary disease, unspecifiedStable.  Follows with Dr. Mcfarlane.  Discussed that she needs to try to get a face shield as she reports that she cannot tolerate a mask and does not wear one even when she is out in public.        Unspecified chronic gastritis without bleedingAbdominal issues are doing better.  We are going to try weaning her first from the promethazine and then from the sucralfate.  She is getting set back up with a referral to GI for the chronic gastritis, which was interrupted d/t COVID.        GERD - Gastro-esophageal reflux disease without esophagitisAs above.        Obstructive sleep apnea (adult) (pediatric)Stable on BiPAP.  She reports difficulty sleeping and requests a sleep aid.  Recommend melatonin OTC.            Other Orders      2028F  Foot examination performed (includes examination through visual inspection, sensory exam with monofilament, and pulse exam - report when any of the three components are completed) (DM)4  (In-House)              Patient Recommendations:        For  Low back pain:    Schedule a follow-up visit in 6 months.                APPOINTMENT INFORMATION:        Monday Tuesday Wednesday Thursday Friday Saturday Sunday            Time:___________________AM  PM   Date:_____________________             Charge Capture:         Primary Diagnosis:     M54.5  Low back pain           Orders:      50327  Office/outpatient visit; established patient, level 4  (In-House)            3017F  Colorectal CA screen results documented and reviewed (PV)  (In-House)            2022F  Dilated retinal eye exam w/interpret by ophthalmologist/optometrist documented/reviewed (DM)4  (In-House)            APPTO  Appointment need  (In-House)              Z79.891  Long term (current) use of opiate analgesic           Orders:       12187  Drug test prsmv qual dir optical obs per day  (In-House)              E11.42  Type 2 diabetes mellitus with diabetic polyneuropathy     I10  HTN - Essential (primary) hypertension     E78.2  HLD - Mixed hyperlipidemia     I48.91  Unspecified atrial fibrillation     F43.12  PTSD - Post-traumatic stress disorder, chronic     J44.9  COPD - Chronic obstructive pulmonary disease, unspecified     K29.50  Unspecified chronic gastritis without bleeding     K21.9  GERD - Gastro-esophageal reflux disease without esophagitis     G47.33  Obstructive sleep apnea (adult) (pediatric)         Other Orders:       2028F  Foot examination performed (includes examination through visual inspection, sensory exam with monofilament, and pulse exam - report when any of the three components are completed) (DM)4  (In-House)              ADDENDUMS:      ____________________________________    Addendum: 09/23/2020 12:47 PM - Ne Palmer        Addendum:  Due to decreased sensation of the feet with callus formation, will put in an order for diabetic shoes.  Thanks, ZIA        Addendum: 02/15/2021 11:22 AM - Five, Team         Visit Note Faxed to:        User Entered Recipient; Number [phone]

## 2021-05-18 NOTE — PROGRESS NOTES
Meliza Arreola 1960     Office/Outpatient Visit    Visit Date: Mon, Charles 10, 2019 11:27 am    Provider: Ne Palmer MD (Assistant: Yeimi Murillo MA)    Location: [facility]        Electronically signed by Ne Palmer MD on  06/10/2019 12:35:23 PM                             SUBJECTIVE:        CC:     Ms. Arreola is a 58 year old White female.  She is here today following a transition of care from an inpatient hospital: [facility]. The patient was admitted on 05/28/2019 to 5/29/2019 due to cardiac ablation. Our office called the patient within 48 hours of discharge and scheduled the follow-up appointment.. During the patient's hospital stay the patient was treated by Dr. Gross.  (not using Breo or Incruse);         HPI: Meliza is here today for HOOD following a total of 3 admissions to [facility] since 5/28.  Her initial admission took place 5/28 to 5/29 when she was admitted by Dr. Gross for loop recorder placement.  She went back home and felt fine initially.  However, on 6/1, she started to note palpitations, so called the cardiac event monitor company and was told that she was in atrial fib with RVR and had been for 2 days.  She then presented to ED at [facility] where she was started on an diltiazem gtt and transferred up to [facility].  After her rhythm was controlled, she was switched back to amiodarone and diltiazem PO and discharged home.  However, she presented again to [facility] ED on 6/6 with RVR again and was started on an amiodarone gtt.  After heart rate was controlled, she was placed again on PO dilt and amio and discharged home to follow up with Dr. Gross.  She has been feeling well since coming home, albeit frustrated that the atrial fib keeps acting up.  She has felt some mild palpitations and still has her SOB.  She has an appt with Dr. Gross on Wed.  They also had a pulmonologist see her for her COPD while she was in the hospital and he stopped her Breo and  Incruse and switched her to Dulera and Spiriva.      ROS:     CONSTITUTIONAL:  Positive for fatigue.   Negative for fever.      EYES:  Negative for blurred vision.      E/N/T:  Negative for diminished hearing, nasal congestion and frequent rhinorrhea.      CARDIOVASCULAR:  Positive for palpitations.   Negative for chest pain, orthopnea, paroxysmal nocturnal dyspnea or tachycardia.      RESPIRATORY:  Positive for chronic cough and dyspnea ( with moderate exertion; with coughing spells ).   Negative for recent cough or frequent wheezing.      GASTROINTESTINAL:  Negative for abdominal pain, constipation, diarrhea, nausea and vomiting.      MUSCULOSKELETAL:  Positive for arthralgias, (L shoulder) back pain, joint stiffness and limb pain ( left upper extremity pain ).   Negative for myalgias.          PMH/FMH/SH:     Last Reviewed on 6/10/2019 11:50 AM by Ne Palmer    Past Medical History:                 PAST MEDICAL HISTORY         Hyperlipidemia    Hypertension     COPD    Sleep Apnea     Chronic Pain     Type 2 Diabetes     Depression    Anxiety    PTSD         PREVENTIVE HEALTH MAINTENANCE             COLORECTAL CANCER SCREENING: Up to date (colonoscopy q10y; sigmoidoscopy q5y; Cologuard q3y) was last done 11/21/2014, Results are in chart; colonoscopy with the following abnormalities noted-- hemorrhoids     EYE EXAM: Diabetic Eye Exam during this calendar year and results are in chart was last done 10/29/2018     MAMMOGRAM: Done within last 2 years and results in are chart was last done 2/1/2019 with normal results     PAP SMEAR: was last done 2/19/2019 with normal results NIL (no HPV run d/t Medicare pt)         PAST MEDICAL HISTORY                 ADVANCED DIRECTIVES: None         Surgical History:         Cholecystectomy    Joint Replacement: L knee - 11/2015;     Tubal Ligation      L rotator cuff repair - 1/2018;         Family History:         Positive for Coronary Artery Disease ( father; mother ).       Positive for grandparents.          Social History:     Occupation: [employer]     Marital Status:      Children: 2 children         Tobacco/Alcohol/Supplements:     Last Reviewed on 6/10/2019 11:50 AM by Ne Palmer    Tobacco: She has a past history of cigarette smoking; quit date:  2011.          Substance Abuse History:     Last Reviewed on 6/10/2019 11:50 AM by Ne Palmer        Mental Health History:     Last Reviewed on 6/10/2019 11:50 AM by Ne Palmer        Communicable Diseases (eg STDs):     Last Reviewed on 6/10/2019 11:50 AM by Ne Palmer            Current Problems:     Last Reviewed on 5/14/2019 11:58 AM by Ne Palmer    Atrial fibrillation     Dizziness     Memory loss     Peripheral neuropathy     Actinic keratosis     Syncope     Use of high risk medications     Rosacea     GERD     Squamous cell carcinoma of skin of upper limb, including shoulder     Low back pain     Artificial joint replacement, Knee     Obstructive sleep apnea     Type 2 diabetes     COPD     Hyperlipidemia     Chronic PTSD     Depression     Hypertension     Follow-up examination     Rash     Screening for depression     Knee pain         Immunizations:     zzFluzone pf-quadrivalent 3 and up 11/17/2014     zzFluzone pf-quadrivalent 3 and up 11/1/2016         Allergies:     Last Reviewed on 5/14/2019 11:58 AM by Ne Palmer      No Known Drug Allergies.         Current Medications:     Last Reviewed on 5/14/2019 11:58 AM by Ne Palmer    Hydrocodone/Acetaminophen 7.5mg/325mg Tablet 1 po tid PRN pain     Eliquis 5mg Tablet Take 1 tablet(s) by mouth bid     Losartan/Hydrochlorothiazide 50mg/12.5mg Tablet 1 tab daily     Invokana 100mg Tablet 1 tab daily     Breo Ellipta 100mcg/25mcg Inhalation Powder Take 1 inhalation(s) by mouth daily     Atorvastatin Calcium 10mg Tablet 1 tab daily     Topiramate 50mg Capsules, Extended Release Take 1 cap by mouth daily     Amitriptyline HCl 75mg Tablet  Take 1 tablet(s) by mouth qHS     Omeprazole 40mg Capsules, Extended Release 1 capsule daily     Lyrica 100mg Capsules Take 1 capsule(s) by mouth tid     Incruse Ellipta 62.5mcg/1blister Inhalation Powder Inhale 1 inhalation(s) by mouth daily     Clonazepam 0.5mg Tablet 1 bid     Aripiprazole 2mg Tablet 1 tab  am     Buspirone HCl 15mg Tablet 1 tab po q 8 hrs prn anxiety     Hydroxyzine HCl 25mg Tablet 1 tab q8h prn anxiety     diclofenac 75mg 1 BID     Aspirin (ASA) 81mg Tablets, Enteric Coated 1 tab daily         OBJECTIVE:        Vitals:         Current: 6/10/2019 11:35:29 AM    Ht:  5 ft, 4 in;  Wt: 199.2 lbs;  BMI: 34.2    T: 97.3 F (oral);  BP: 107/56 mm Hg (left arm, sitting);  P: 51 bpm (left arm (BP Cuff), sitting);  sCr: 0.86 mg/dL;  GFR: 81.74        Exams:     PHYSICAL EXAM:     GENERAL: vital signs recorded - well developed, well nourished;  well groomed;  no apparent distress;     EYES: extraocular movements intact; conjunctiva and cornea are normal; PERRLA;     RESPIRATORY: normal respiratory rate and pattern with no distress; normal breath sounds with no rales, rhonchi, wheezes or rubs;     CARDIOVASCULAR: normal rate; rhythm is irregularly irregular;  no systolic murmur; no edema;     GASTROINTESTINAL: nontender; normal bowel sounds;     MUSCULOSKELETAL: normal gait; normal overall tone         ASSESSMENT           427.31   I48.91  Atrial fibrillation              DDx:     496   J44.9  COPD              DDx:     250.00   E11.42  Type 2 diabetes              DDx:         ORDERS:         Meds Prescribed:       Refill of: Dulera (Mometasone Furoate/Formoterol Fumarate) 100mcg/5mcg Oral Inhaler Inhale 2 puff(s) by mouth bid  #13 (Thirteen) gm Refills: 5         Radiology/Test Orders:       3014F  Screening mammography results documented and reviewed (PV)1  (In-House)         3017F  Colorectal CA screen results documented and reviewed (PV)  (In-House)           Lab Orders:       [id number]  COMP - [facility] Comp.  Metabolic Panel  (Send-Out)         780[facility]  ([facility]                   PLAN:          Atrial fibrillation Meliza is having ongoing trouble with  her atrial fibrillation, which keeps bouncing up into RVR.  She has a loop recorder in place and is currently on amiodarone and diltiazem.  Dr. Gross is adjusting the medications to try to get her sx under control and keep her at a normal rate.  She is going to see him on Wednesday.  At the hospital, she had some elevated LFTs as well as some glucosuria so will check A1c and CMP today to evaluate.  Keep appt with me upcoming in 3 weeks.     MIPS Screening mammomgram done within last 2 years and results in are chart Colorectal Cancer Screening is up to date and the results are in the chart     FOLLOW-UP: Keep currently scheduled appointment..  7/2/2019           Orders:       3014F  Screening mammography results documented and reviewed (PV)1  (In-House)         3017F  Colorectal CA screen results documented and reviewed (PV)  (In-House)            COPD She has been previously been on Symbicort and Breo with no improvement of sx.  Pulmonologist in the hospital wanted her to go on the Dulera and Spiriva.  Rx sent.           Prescriptions:       Refill of: Dulera (Mometasone Furoate/Formoterol Fumarate) 100mcg/5mcg Oral Inhaler Inhale 2 puff(s) by mouth bid  #13 (Thirteen) gm Refills: 5          Type 2 diabetes     LABORATORY:  Labs ordered to be performed today include Comprehensive metabolic panel and HgbA1C.            Orders:       [id number]  COMP - [facility] Comp. Metabolic Panel  (Send-Out)         68185  [facility]  (Send-Out)               Patient Recommendations:        For  Atrial fibrillation:                     APPOINTMENT INFORMATION:        Monday Tuesday Wednesday Thursday Friday Saturday Sunday            Time:___________________AM  PM   Date:_____________________             CHARGE CAPTURE           **Please note:  ICD descriptions below are intended for billing purposes only and may not represent clinical diagnoses**        Primary Diagnosis:         427.31 Atrial fibrillation            I48.91    Unspecified atrial fibrillation              Orders:          [id number]   Transitional care manage service 14 day discharge  (In-House)             3014F   Screening mammography results documented and reviewed (PV)1  (In-House)             3017F   Colorectal CA screen results documented and reviewed (PV)  (In-House)           496 COPD            J44.9    Chronic obstructive pulmonary disease, unspecified    250.00 Type 2 diabetes            E11.42    Type 2 diabetes mellitus with diabetic polyneuropathy

## 2021-05-18 NOTE — PROGRESS NOTES
"Meliza Arreola. 1960     Office/Outpatient Visit    Visit Date: Tue, May 14, 2019 11:39 am    Provider: Ne Palmer MD (Assistant: Yeimi Murillo MA)    Location: [facility]        Electronically signed by Ne Palmer MD on  05/14/2019 12:38:52 PM                             SUBJECTIVE:        CC:     Ms. Arreola is a 58 year old White female.  Patient presents today for follow up (not taking Klor Con);         HPI: Mayuri is here today to discuss her recent Derm follow-up.  She was referred there due to a diffuse pruritic rash, worst over the trunk.  We tried eliminating several medications one by one but none of these seemed to impact the rash.  She therefore went to see Dr. Olson and had punch biopsy done there.  Meliza brings that pathology report in with her today and it came back as perivascular lymphocytic infiltrate.  The dermatologist recommended that we consider increasing her Lyrica, as the itching/crawling sensation may actually be a sensory neuropathy that is then causing her to scratch, leading to the visible rash.  She is using hydroxyzine and Benadryl but with minimal relief.  She has found that the only thing that relieves the rash is \"burning it with hot water.\"  The dermatologist told her that if Lyrica did not work, she should come back so a repeat biopsy could be performed.      ROS:     CONSTITUTIONAL:  Positive for fatigue.   Negative for fever.      EYES:  Negative for blurred vision.      CARDIOVASCULAR:  Negative for chest pain and palpitations.      RESPIRATORY:  Positive for chronic cough and dyspnea ( with moderate exertion; with coughing spells ).   Negative for recent cough or frequent wheezing.      MUSCULOSKELETAL:  Positive for arthralgias, (L shoulder) back pain, joint stiffness and limb pain ( left upper extremity pain ).   Negative for myalgias.      INTEGUMENTARY/BREAST:  Positive for pruritis and rash.          PMH/FMH/SH:     Last " Reviewed on 5/14/2019 11:58 AM by Ne Palmer    Past Medical History:                 PAST MEDICAL HISTORY         Hyperlipidemia    Hypertension     COPD    Sleep Apnea     Chronic Pain     Type 2 Diabetes     Depression    Anxiety    PTSD         PREVENTIVE HEALTH MAINTENANCE             COLORECTAL CANCER SCREENING: Up to date (colonoscopy q10y; sigmoidoscopy q5y; Cologuard q3y) was last done 11/21/2014, Results are in chart; colonoscopy with the following abnormalities noted-- hemorrhoids     EYE EXAM: Diabetic Eye Exam during this calendar year and results are in chart was last done 10/29/2018     MAMMOGRAM: Done within last 2 years and results in are chart was last done 2/1/2019 with normal results     PAP SMEAR: was last done 2/19/2019 with normal results NIL (no HPV run d/t Medicare pt)         PAST MEDICAL HISTORY                 ADVANCED DIRECTIVES: None         Surgical History:         Cholecystectomy    Joint Replacement: L knee - 11/2015;     Tubal Ligation      L rotator cuff repair - 1/2018;         Family History:         Positive for Coronary Artery Disease ( father; mother ).      Positive for grandparents.          Social History:     Occupation: [employer]     Marital Status:      Children: 2 children         Tobacco/Alcohol/Supplements:     Last Reviewed on 5/14/2019 11:58 AM by Ne Palmer    Tobacco: She has a past history of cigarette smoking; quit date:  2011.          Substance Abuse History:     Last Reviewed on 5/14/2019 11:58 AM by Ne Palmer        Mental Health History:     Last Reviewed on 5/14/2019 11:58 AM by Ne Palmer        Communicable Diseases (eg STDs):     Last Reviewed on 5/14/2019 11:58 AM by Ne Palmer            Current Problems:     Last Reviewed on 3/25/2019 01:10 PM by Ne Palmer    Atrial fibrillation     Dizziness     Memory loss     Peripheral neuropathy     Actinic keratosis     Syncope     Use of high risk medications     Rosacea      GERD     Squamous cell carcinoma of skin of upper limb, including shoulder     Low back pain     Artificial joint replacement, Knee     Obstructive sleep apnea     Type 2 diabetes     COPD     Hyperlipidemia     Chronic PTSD     Depression     Hypertension     Hypokalemia     Follow-up examination     Screening for depression     Knee pain         Immunizations:     zzFluzone pf-quadrivalent 3 and up 11/17/2014     zzFluzone pf-quadrivalent 3 and up 11/1/2016         Allergies:     Last Reviewed on 3/25/2019 01:10 PM by Ne Palmer      No Known Drug Allergies.         Current Medications:     Last Reviewed on 3/25/2019 01:10 PM by Ne Palmer    Hydrocodone/Acetaminophen 7.5mg/325mg Tablet 1 po tid PRN pain     Eliquis 5mg Tablet Take 1 tablet(s) by mouth bid     Losartan/Hydrochlorothiazide 50mg/12.5mg Tablet 1 tab daily     Invokana 100mg Tablet 1 tab daily     Breo Ellipta 100mcg/25mcg Inhalation Powder Take 1 inhalation(s) by mouth daily     Atorvastatin Calcium 10mg Tablet 1 tab daily     Klor-Con M20 20mEq Tablets, Extended Release Take 1 tablet(s) by mouth bid     Topiramate 50mg Capsules, Extended Release Take 1 cap by mouth daily     Amitriptyline HCl 75mg Tablet Take 1 tablet(s) by mouth qHS     Lyrica 50mg Capsules Take 1 capsule(s) by mouth tid     Incruse Ellipta 62.5mcg/1blister Inhalation Powder Inhale 1 inhalation(s) by mouth daily     Omeprazole 40mg Capsules, Extended Release 1 capsule daily     Clonazepam 0.5mg Tablet 1 bid     Aripiprazole 2mg Tablet 1 tab  am     Buspirone HCl 15mg Tablet 1 tab po q 8 hrs prn anxiety     Hydroxyzine HCl 25mg Tablet 1 tab q8h prn anxiety     diclofenac 75mg 1 BID     Aspirin (ASA) 81mg Tablets, Enteric Coated 1 tab daily         OBJECTIVE:        Vitals:         Current: 5/14/2019 11:44:17 AM    Ht:  5 ft, 4 in;  Wt: 198 lbs;  BMI: 34.0    T: 98 F (oral);  BP: 115/45 mm Hg (left arm, sitting);  P: 64 bpm (left arm (BP Cuff), sitting);  sCr: 0.86  mg/dL;  GFR: 81.53        Exams:     PHYSICAL EXAM:     GENERAL: vital signs recorded - well developed, well nourished;  well groomed;  no apparent distress;     EYES: extraocular movements intact; conjunctiva and cornea are normal; PERRLA;     RESPIRATORY: normal respiratory rate and pattern with no distress;     BREAST/INTEGUMENT: a rash is noted that is diffuse in location;  the color is mainly red;  it is best characterized as papular;     MUSCULOSKELETAL: normal gait; normal overall tone         ASSESSMENT           782.1   R21  Rash              DDx:     356.9   E08.42  Peripheral neuropathy              DDx:         ORDERS:         Meds Prescribed:       Refill of: Lyrica (Pregabalin) 100mg Capsules Take 1 capsule(s) by mouth tid  #90 (Ninety) capsule(s) Refills: 2                 PLAN:          Rash Meliza is here today to follow up on the results from her recent Derm consult.  Path report showed perivascular lymphocytic infiltrate.  Dermatology felt that the itching may be due to worsening peripheral neuropathy and that subsequent attempts to get the itching to stop were actually causing the visible rash.  It was recommended that we consider upping the Lyrica, which I have done today to 100 mg TID.  I will see her back as scheduled in 6 weeks.  If she is still having significant itching and rash still at that time, will plan to send her back to Derm for further eval.          Peripheral neuropathy           Prescriptions:       Refill of: Lyrica (Pregabalin) 100mg Capsules Take 1 capsule(s) by mouth tid  #90 (Ninety) capsule(s) Refills: 2             CHARGE CAPTURE           **Please note: ICD descriptions below are intended for billing purposes only and may not represent clinical diagnoses**        Primary Diagnosis:         782.1 Rash            R21    Rash and other nonspecific skin eruption              Orders:          [id number]   Office/outpatient visit; established patient, level 3  (In-House)            356.9 Peripheral neuropathy            E08.42    Diabetes mellitus due to underlying condition with diabetic polyneuropathy

## 2021-05-18 NOTE — PROGRESS NOTES
Meliza Arreola 1960     Office/Outpatient Visit    Visit Date: Tue, Sep 18, 2018 01:32 pm    Provider: Ne Palmer MD (Assistant: Geri Penaloza RN)    Location: [facility]        Electronically signed by Ne Palmer MD on  09/18/2018 04:48:15 PM                             SUBJECTIVE:        CC:     Ms. Arreola is a 57 year old White female.  The patient is accompanied into the exam room by her spouse.  3 month check up;         HPI: Meliza is here to f/u on chronic issues.  Her  is with her today because he is worried about her memory.  Long-term memory is fine.  She has had some memory problems with cooking (forgetting to turn the stove off).  He reports that they will be having a conversation and then just forget what they're talking about.  She does not drive.          She is on hydrocodone/APAP and diclofenac for chronic knee, shoulder, and back pain, typically using 2-3 tabs daily of the Norco.  She has gone for PT and epidurals previously without much relief.  She is s/p L TKA.  She follows with Dr. Stout.  She does still do her exercises at home.  She is on Lyrica and amitriptyline for diabetic peripheral neuropathy.  The amitriptyline also helps her insomnia.  The Lyrica is the most helpful for the peripheral neuropathy pain.  She does have         She is on metformin and Invokana for diabetes.  Last A1c was 7.7.  She is on an ACEI and statin.  She is due for foot exam (last done 8/2017).  She is UTD on eye exam, done by Dr. Nair in [facility] last week.        She is on losartan/HCTZ for HTN.  BP has been well controlled.  No CP, palpitations.        She is on Breo Ellipta, Incruse Ellipta, and Spiriva for COPD.  She does have some baseline SOB.  That seems to be worse this summer because it has been so hot and humid.        She is on topiramate, venlafaxine, Abilify, buspirone and hydroxyzine prn for chronic PTSD.  She follows with Dr. Romero Psychiatry who  manages her meds.     ROS:     CONSTITUTIONAL:  Positive for fatigue.   Negative for fever.      EYES:  Negative for blurred vision.      E/N/T:  Negative for diminished hearing, nasal congestion and frequent rhinorrhea.      CARDIOVASCULAR:  Negative for chest pain and palpitations.      RESPIRATORY:  Positive for chronic cough and dyspnea ( with moderate exertion ).   Negative for recent cough or frequent wheezing.      GASTROINTESTINAL:  Negative for abdominal pain, constipation, diarrhea, nausea and vomiting.      MUSCULOSKELETAL:  Positive for arthralgias, (L shoulder) back pain, joint stiffness and limb pain ( left upper extremity pain ).   Negative for myalgias.      NEUROLOGICAL:  Positive for memory loss and paresthesia ( bilateral lower extremity ).   Negative for headaches or weakness.      PSYCHIATRIC:  Positive for anxiety, depression, feelings of stress, anhedonia, difficulty concentrating and insomnia.          PMH/FMH/SH:     Last Reviewed on 9/18/2018 01:45 PM by Ne Palmer    Past Medical History:                 PAST MEDICAL HISTORY         Hyperlipidemia    Hypertension     COPD    Sleep Apnea     Chronic Pain     Type 2 Diabetes     Depression    Anxiety    PTSD         PREVENTIVE HEALTH MAINTENANCE             COLORECTAL CANCER SCREENING: Up to date (colonoscopy q10y; sigmoidoscopy q5y; Cologuard q3y) was last done 11/21/2014, Results are in chart; colonoscopy with the following abnormalities noted-- hemorrhoids     MAMMOGRAM: was last done 9/28/16 with normal results     PAP SMEAR: was last done 8/2014 with normal results         PAST MEDICAL HISTORY                 ADVANCED DIRECTIVES: None         Surgical History:         Cholecystectomy    Joint Replacement: L knee - 11/2015;     Tubal Ligation      L rotator cuff repair - 1/2018;         Family History:         Positive for Coronary Artery Disease ( father; mother ).      Positive for grandparents.          Social History:      Occupation: [employer]     Marital Status:      Children: 2 children         Tobacco/Alcohol/Supplements:     Last Reviewed on 9/18/2018 01:45 PM by Ne Palmer    Tobacco: She has a past history of cigarette smoking; quit date:  2011.          Substance Abuse History:     Last Reviewed on 9/18/2018 01:45 PM by Ne Palmer        Mental Health History:     Last Reviewed on 9/18/2018 01:45 PM by Ne Palmer        Communicable Diseases (eg STDs):     Last Reviewed on 9/18/2018 01:45 PM by Ne Palmer            Current Problems:     Last Reviewed on 6/14/2018 04:24 PM by Ne Palmer    Peripheral neuropathy     Actinic keratosis     Syncope     Use of high risk medications     Rosacea     GERD     Squamous cell carcinoma of skin of upper limb, including shoulder     Low back pain     Artificial joint replacement, Knee     Obstructive sleep apnea     Type 2 diabetes     COPD     Hyperlipidemia     Chronic PTSD     Depression     Hypertension     Knee pain         Immunizations:     zzFluzone pf-quadrivalent 3 and up 11/17/2014     zzFluzone pf-quadrivalent 3 and up 11/1/2016         Allergies:     Last Reviewed on 6/14/2018 04:24 PM by Ne Palmer      No Known Drug Allergies.         Current Medications:     Last Reviewed on 6/14/2018 04:24 PM by Ne Palmer    Hydrocodone/Acetaminophen 7.5mg/325mg Tablet 1 po tid PRN pain     Invokana 100mg Tablet 1 tab daily     Amitriptyline HCl 75mg Tablet Take 1 tablet by mouth qHS     Breo Ellipta 100mcg/25mcg Inhalation Powder Take 1 inhalation(s) by mouth daily     Atorvastatin Calcium 10mg Tablet 1 tab daily     Losartan/Hydrochlorothiazide 50mg/12.5mg Tablet 1 tab daily     Topiramate 50mg Capsules, Extended Release Take 1 cap by mouth daily     Incruse Ellipta 62.5mcg/1blister Inhalation Powder Inhale 1 inhalation(s) by mouth daily     Ventolin HFA 90mcg/1actuation Oral Inhaler inhale 2 puffs q 4hrs prn for wheezing/SOA     Glucose Reagent  Blood Test Strips  Reagent Strips Check BS TID with OneTouch Hellen Dx E11.9     Lancet   Lancet Check BS TID with OneTouch Hellen DX 11.9     Aripiprazole 2mg Tablet 1 tab  am     Buspirone HCl 15mg Tablet 1 tab po q 8 hrs prn anxiety     Hydroxyzine HCl 25mg Tablet 1 tab q8h prn anxiety     diclofenac 75mg 1 BID     Venlafaxine HCl 150mg Capsules, Extended Release 1 tab daily     Venlafaxine HCl 75mg Capsules, Extended Release 1 cap daily     Aspirin (ASA) 81mg Tablets, Enteric Coated 1 tab daily         OBJECTIVE:        Vitals:         Current: 9/18/2018 1:35:18 PM    Ht:  5 ft, 4 in;  Wt: 218.4 lbs;  BMI: 37.5    T: 98.2 F (oral);  BP: 132/56 mm Hg (left arm, sitting);  P: 70 bpm (left arm (BP Cuff), sitting);  sCr: 0.77 mg/dL;  GFR: 96.03        Exams:     PHYSICAL EXAM:     GENERAL: vital signs recorded - well developed, well nourished;  well groomed;  no apparent distress;     EYES: extraocular movements intact; conjunctiva and cornea are normal; PERRLA;     E/N/T: OROPHARYNX:  normal mucosa, dentition, gingiva, and posterior pharynx;     RESPIRATORY: normal respiratory rate and pattern with no distress; normal breath sounds with no rales, rhonchi, wheezes or rubs;     CARDIOVASCULAR: normal rate; rhythm is regular;  no edema;     GASTROINTESTINAL: nontender; normal bowel sounds;     MUSCULOSKELETAL: gait: antalgic;  normal overall tone     NEUROLOGIC: Mini-Mental State Exam score is MMSE 25/30     PSYCHIATRIC: appropriate affect and demeanor; normal psychomotor function; normal speech pattern; normal thought and perception;     Left foot exam    Protective sensation using Monofilament test: Decreased, with estimated force of 2 grams applied.    Vascular status: normal peripheral vascular exam with palpable dorsal pedal and posterior tibal pulses and brisk digital capillary refill    Skin integrity:    Right foot exam    Protective sensation using Monofilament test: Decreased, with estimated force of 2 grams  applied.    Vascular status: normal peripheral vascular exam with palpable dorsal pedal and posterior tibal pulses and brisk digital capillary refill    Skin is intact without sores or ulcers         Lab/Test Results:             Amphetamines Screen, Urin:  Negative (09/18/2018),     BAR-Barbiturates Screen, Urin:  Negative (09/18/2018),     Buprenorphine:  Negative (09/18/2018),     BZO-Benzodiazepines Screen,Ur:  Negative (09/18/2018),     Cocaine(Metab.)Screen, Ur:  Negative (09/18/2018),     MDMA-Ecstasy:  Negative (09/18/2018),     Met-Methamphetamine:  Negative (09/18/2018),     MTD-Methadone Screen, Urine:  Negative (09/18/2018),     Opiate Screen, Urine:  Positive (09/18/2018),     OXY-Oxycodone:  Negative (09/18/2018),     PCP-Phencyclidine Screen, Uri:  Negative (09/18/2018),     THC Cannabinoids Screen, Urin:  Negative (09/18/2018),     Urine temperature:  confirmed (09/18/2018),     Date and time of last pill:  hydrocodone-9/18/18 @ 9am, lyrica- 9/18/18 @ 9am /amyh (09/18/2018),     Performed by:  [facility] (09/18/2018),     Collection Time:  2:20 (09/18/2018),             ASSESSMENT           724.2   M54.5  Low back pain              DDx:     356.9   E08.42  Peripheral neuropathy              DDx:     V58.69   Z79.891  Use of high risk medications              DDx:     780.93   R41.2   R41.3  Memory loss              DDx:     309.81   F43.12  Chronic PTSD              DDx:     401.1   I10  Hypertension              DDx:     250.00   E11.42  Type 2 diabetes              DDx:         ORDERS:         Lab Orders:       20223  Drug test prsmv qual dir optical obs per day  (In-House)         APPTO  Appointment need  (In-House)         79945  B12FO - [facility] Vitamin B12 with Folate  (Send-Out)         30453  RPR - [facility] RPR, Rfx Qn RPR/Confirm TP  (Send-Out)         10820  TSH - [facility] TSH  (Send-Out)         96661  BDCB2 - [facility] CBC w/o diff  (Send-Out)         50358  DIAB1 - [facility] LIPID,CMP, A1C: 15566, 23186, 75303   (Send-Out)           Procedures Ordered:       REFER  Referral to Specialist or Other Facility  (Send-Out)           Other Orders:       2028F  Foot examination performed (includes examination through visual inspection, sensory exam with monofi  (In-House)           Calculated BMI above the upper parameter and a follow-up plan was documented in the medical record  (In-House)                   PLAN:          Low back pain Stable on current regimen of Norco and NSAIDs for chronic back and joint pain.  She is able to function, although the combination of pain and depression/anxiety is tough for her.  She does require ongoing use of these controlled substances to function.  Tox screen and Huber run today.  RTC 3 months.         FOLLOW-UP: Schedule a follow-up visit in 3 months..  Medicare wellness 30 min with Spencer           Orders:       APPTO  Appointment need  (In-House)             Patient Education Handouts:       [facility] [place]           Peripheral neuropathy She is having worsening numbness in the feet despite the Lyrica.  Controlled substance monitoring as below.  She does have slight redness of the R hallux where she has been cutting back her toenail for what looks like a fungal infection.  We are going to get her in with Dr. Chase Podiatry.          Use of high risk medications     Controlled substance documentation: Huber reviewed; drug screen performed and appropriate; consent is reviewed and signed and on the chart.  She is aware of risk of addiction on this medication, understands that she will need to follow up for a review every 3 months and her medications will be adjusted or decreased as deemed appropriate at each visit.  No history of drug or alcohol abuse.  No concerns about diversion or abuse. She denies side effects related to the medication.  She is aware that she may be called in for pill counts.  The dosing of this medication will be reviewed on a regular basis and  reduced if possible..  Ongoing use of a controlled substance is necessary for this patient to have a normal quality of life           Orders:       69107  Drug test prsmv qual dir optical obs per day  (In-House)            Memory loss Her  is very worried about her memory loss.  She does endorse this herself as well.  Her MMSE scored at 25/30 today, which is reassuring.  Will run some memory labs as well as below.  Other considerations would include pseudodementia from her depression vs medication effects, as she has a complex mental health history and is on several medications that affect the CNS.  She has not brought this up with Dr. Romero, so I have encouraged her to raise this concern with him.      LABORATORY:  Labs ordered to be performed today include B12 with Folate, CBC W/O DIFF, RPR, and TSH.            Orders:       83719  B12FO - [facility] Vitamin B12 with Folate  (Send-Out)         69804  RPR - [facility] RPR, Rfx Qn RPR/Confirm TP  (Send-Out)         71459  TSH - [facility] TSH  (Send-Out)         68526  BDCB2 - [facility] CBC w/o diff  (Send-Out)            Chronic PTSD Follows with Dr. Romero.  He is managing her meds.           Hypertension BP at goal.  No refills needed.  Checking labs.          Type 2 diabetes She is on metformin and Invokana for diabetes.  No refills sent.  Last A1c was 7.7.  Checking labs.  She is on an ACEI and statin.  Foot exam today shows decreased sensation and her paresthesias have gotten worse.  We are going to get her in with Dr. Chase.  She is UTD on eye exam, done by Dr. Nair in [facility] last week; requesting records.     LABORATORY:  Labs ordered to be performed today include Diabetes Panel 1; CMP, Lipid, A1C.      REFERRALS:  Referral initiated to a podiatrist ( Ajay Chase [facility]; for evaluation of diabetic feet ).  MIPS     BMI Elevated - Follow-Up Plan: She was provided education on weight loss strategies           Orders:       REFER  Referral to  Specialist or Other Facility  (Send-Out)         51368  DIAB - [facility] LIPID,CMP, A1C: 37123, 67977, 73421  (Send-Out)           Calculated BMI above the upper parameter and a follow-up plan was documented in the medical record  (In-House)               Other Orders:       2028F  Foot examination performed (includes examination through visual inspection, sensory exam with monofi  (In-House)           Patient Recommendations:        For  Low back pain:     Schedule a follow-up visit in 3 months.                APPOINTMENT INFORMATION:        Monday Tuesday Wednesday Thursday Friday Saturday Sunday            Time:___________________AM  PM   Date:_____________________             CHARGE CAPTURE           **Please note: ICD descriptions below are intended for billing purposes only and may not represent clinical diagnoses**        Primary Diagnosis:         724.2 Low back pain            M54.5    Low back pain              Orders:          91537   Office/outpatient visit; established patient, level 4  (In-House)             APPTO   Appointment need  (In-House)           356.9 Peripheral neuropathy            E08.42    Diabetes mellitus due to underlying condition with diabetic polyneuropathy    V58.69 Use of high risk medications            Z79.891    Long term (current) use of opiate analgesic              Orders:          53102   Drug test prsmv qual dir optical obs per day  (In-House)           780.93 Memory loss            R41.2    Retrograde amnesia           R41.3    Other amnesia    309.81 Chronic PTSD            F43.12    Post-traumatic stress disorder, chronic    401.1 Hypertension            I10    Essential (primary) hypertension    250.00 Type 2 diabetes            E11.42    Type 2 diabetes mellitus with diabetic polyneuropathy              Orders:             Calculated BMI above the upper parameter and a follow-up plan was documented in the medical record  (In-House)               Other  Orders:           2028F   Foot examination performed (includes examination through visual inspection, sensory exam with monofi  (In-House)

## 2021-05-18 NOTE — PROGRESS NOTES
Meliza Arreola 1960     Office/Outpatient Visit    Visit Date: Mon, Feb 18, 2019 01:31 pm    Provider: Ne Palmer MD (Assistant: Yeimi Murillo MA)    Location: [facility]        Electronically signed by Ne Palmer MD on  02/18/2019 04:47:44 PM                             SUBJECTIVE:        CC:     Ms. Arreola is a 58 year old White female.  Patient presents today for well woman exam, also has complaints of bites on arms X 2 weeks;         HPI:     She is UTD on colonoscopy, last done 11/2014 and this was normal.  She is due for pap smear, last done 8/2014 and this was normal.  She is UTD on mammogram, last done 2/2019 and this was normal.  She is due for Shingrix, Havrix, Td and flu.  She is due for routine labs including DM panel and MAB.  She is UTD on eye exam (10/2018).  She is UTD on foot exam (9/2018).     Patient to be evaluated for well Woman Exam.  Her last physical exam was 2 months ago.  She is menopausal.  She is not currently using any form of contraception.  She performs breast self-exams rarely.    Her last Pap smear was in 08/13/2014 and was normal.   Her last mammogram was <6 months ago and was normal.   Preventative Health updated today.  Ms. Arreola denies any history of abnormal Pap smears.              PHQ-9 Depression Screening: Completed form scanned and in chart; Total Score 24 Alcohol Consumption Screening: Completed form scanned and in chart; Total Score 1     ROS:     CONSTITUTIONAL:  Positive for fatigue.   Negative for fever.      EYES:  Negative for blurred vision.      E/N/T:  Negative for diminished hearing, nasal congestion and frequent rhinorrhea.      CARDIOVASCULAR:  Negative for chest pain and palpitations.      RESPIRATORY:  Positive for chronic cough and dyspnea ( with moderate exertion; with coughing spells ).   Negative for recent cough or frequent wheezing.      GASTROINTESTINAL:  Negative for abdominal pain, constipation, diarrhea,  nausea and vomiting.      GENITOURINARY:  Negative for dysuria, urinary incontinence, vaginal discharge and vaginal itching.      MUSCULOSKELETAL:  Positive for arthralgias, (L shoulder) back pain, joint stiffness and limb pain ( left upper extremity pain ).   Negative for myalgias.      INTEGUMENTARY/BREAST:  Positive for rash and performance of self breast exams.   Negative for breast mass, skin changes of breast, breast tenderness or nipple discharge.      PSYCHIATRIC:  Positive for anxiety, depression, feelings of stress, anhedonia, difficulty concentrating and insomnia.          PMH/FMH/SH:     Last Reviewed on 2/18/2019 01:55 PM by Ne Palmer    Past Medical History:                 PAST MEDICAL HISTORY         Hyperlipidemia    Hypertension     COPD    Sleep Apnea     Chronic Pain     Type 2 Diabetes     Depression    Anxiety    PTSD         PREVENTIVE HEALTH MAINTENANCE             COLORECTAL CANCER SCREENING: Up to date (colonoscopy q10y; sigmoidoscopy q5y; Cologuard q3y) was last done 11/21/2014, Results are in chart; colonoscopy with the following abnormalities noted-- hemorrhoids     EYE EXAM: Diabetic Eye Exam during this calendar year and results are in chart was last done 10/29/2018     MAMMOGRAM: Done within last 2 years and results in are chart was last done 2/1/2019 with normal results     PAP SMEAR: was last done 8/2014 with normal results         PAST MEDICAL HISTORY                 ADVANCED DIRECTIVES: None         Surgical History:         Cholecystectomy    Joint Replacement: L knee - 11/2015;     Tubal Ligation      L rotator cuff repair - 1/2018;         Family History:         Positive for Coronary Artery Disease ( father; mother ).      Positive for grandparents.          Social History:     Occupation: [employer]     Marital Status:      Children: 2 children         Tobacco/Alcohol/Supplements:     Last Reviewed on 2/18/2019 01:55 PM by Ne Palmer    Tobacco: She has a past  history of cigarette smoking; quit date:  2011.          Substance Abuse History:     Last Reviewed on 2/18/2019 01:55 PM by Ne Palmer        Mental Health History:     Last Reviewed on 2/18/2019 01:55 PM by Ne Palmer        Communicable Diseases (eg STDs):     Last Reviewed on 2/18/2019 01:55 PM by Ne Palmer            Current Problems:     Last Reviewed on 1/25/2019 04:37 PM by Ne Palmer    Dizziness     Memory loss     Peripheral neuropathy     Actinic keratosis     Syncope     Use of high risk medications     Rosacea     GERD     Squamous cell carcinoma of skin of upper limb, including shoulder     Low back pain     Artificial joint replacement, Knee     Obstructive sleep apnea     Type 2 diabetes     COPD     Hyperlipidemia     Chronic PTSD     Depression     Hypertension     Screening mammogram - other     Screening for depression     Knee pain         Immunizations:     zzFluzone pf-quadrivalent 3 and up 11/17/2014     zzFluzone pf-quadrivalent 3 and up 11/1/2016         Allergies:     Last Reviewed on 1/25/2019 04:37 PM by Ne Palmer      No Known Drug Allergies.         Current Medications:     Last Reviewed on 1/25/2019 04:37 PM by Ne Palmer    Hydrocodone/Acetaminophen 7.5mg/325mg Tablet 1 po tid PRN pain     Breo Ellipta 100mcg/25mcg Inhalation Powder Take 1 inhalation(s) by mouth daily     Losartan/Hydrochlorothiazide 50mg/12.5mg Tablet 1 tab daily     Atorvastatin Calcium 10mg Tablet 1 tab daily     Invokana 100mg Tablet 1 tab daily     Topiramate 50mg Capsules, Extended Release Take 1 cap by mouth daily     Amitriptyline HCl 75mg Tablet Take 1 tablet(s) by mouth qHS     Amitriptyline HCl 25mg Tablet 2 tabs PO QHS x 2 weeks, then 1 tab PO QHS x 2 weeks, then stop     Albuterol 0.083% Nebulizer Solution 1 vial q 6 hours prn SOA/Wheezing  DX J20.9     Lyrica 50mg Capsules Take 1 capsule(s) by mouth bid     Incruse Ellipta 62.5mcg/1blister Inhalation Powder Inhale 1  inhalation(s) by mouth daily     Omeprazole 40mg Capsules, Extended Release 1 capsule daily     Clonazepam 0.5mg Tablet 1 bid     Aripiprazole 2mg Tablet 1 tab  am     Buspirone HCl 15mg Tablet 1 tab po q 8 hrs prn anxiety     Hydroxyzine HCl 25mg Tablet 1 tab q8h prn anxiety     diclofenac 75mg 1 BID     Aspirin (ASA) 81mg Tablets, Enteric Coated 1 tab daily         OBJECTIVE:        Vitals:         Current: 2/18/2019 1:43:39 PM    Ht:  5 ft, 4 in;  Wt: 205.2 lbs;  BMI: 35.2    T: 97.6 F (oral);  BP: 107/62 mm Hg (left arm, sitting);  P: 70 bpm (left arm (BP Cuff), sitting);  sCr: 0.87 mg/dL;  GFR: 81.83        Exams:     PHYSICAL EXAM:     GENERAL: vital signs recorded - well developed, well nourished;  well groomed;  no apparent distress;     EYES: extraocular movements intact; conjunctiva and cornea are normal; PERRLA;     E/N/T: OROPHARYNX:  normal mucosa, dentition, gingiva, and posterior pharynx;     RESPIRATORY: normal respiratory rate and pattern with no distress; normal breath sounds with no rales, rhonchi, wheezes or rubs;     CARDIOVASCULAR: normal rate; rhythm is regular;  no edema;     GASTROINTESTINAL: nontender; normal bowel sounds;     GENITOURINARY: Pap smear taken;  external genitalia: normal without lesions or urethral abnormalities;;  vagina: normal with good pelvic support and no lesions or discharge;;  cervix: normal appearance without lesions or discharge;;  uterus: normal size and position, well-supported;;  adnexa: normal with no masses or tenderness     LYMPHATIC: no axillary adenopathy;     BREAST/INTEGUMENT: breast exam: no overlying skin changes; no breast masses;     MUSCULOSKELETAL: gait: antalgic;  normal overall tone         ASSESSMENT:           V72.31     Well Woman Exam     V79.0   Z13.89  Screening for depression              DDx:     250.00   E11.42  Type 2 diabetes              DDx:         ORDERS:         Radiology/Test Orders:       3014F  Screening mammography results  documented and reviewed (PV)1  (In-House)         3017F  Colorectal CA screen results documented and reviewed (PV)  (In-House)         2022F  Dilated retinal eye exam w/interpret by ophthalmologist/optometrist documented/reviewed (DM)4  (In-House)           Lab Orders:       [id number]  Cytopathology, slides, cervical or vaginal; manual screening under MD supervision  (Send-Out)         87876  DIAB1 [facility] LIPID,CMP, A1C: 73259, 62480, 37897  (Send-Out)         41085  MOHSEN [facility] Microablbumin, quantitative  (Send-Out)           Procedures Ordered:       78023  Handlg&/or convey of spec for TR office to lab  (In-House)           Other Orders:         Negative EtOH screen  (In-House)           Calculated BMI above the upper parameter and a follow-up plan was documented in the medical record  (In-House)         *  Well woman exam  (In-House)         *  Medicare pap smear  (In-House)                   PLAN:          Well Woman Exam She is UTD on colonoscopy, last done 11/2014 and this was normal.  She is due for pap smear, last done 8/2014 and this was normal.  Pap smear completed  today; cytology only per Medicare guidelines since HPV is not reimbursed.  She is UTD on mammogram, last done 2/2019 and this was normal.  She is due for Shingrix, Havrix, Td and flu.  Declines these today. She is due for routine labs including DM panel and MAB; ordered.  She is UTD on eye exam (10/2018).  She is UTD on foot exam (9/2018).  RTC as scheduled next month.     [facility] PHQ-9 Depression Screening Completed form scanned and in chart; Total Score 24 Negative alcohol screen     MAMMOGRAM: Done within last 2 years and results in are chart     COLORECTAL CANCER SCREENING: Results are in chart     BMI Elevated - Follow-Up Plan: She was provided education on weight loss strategies     FOLLOW-UP: Keep currently scheduled appointment..            Orders:      [id number]  Cytopathology, slides, cervical or vaginal; manual screening  under MD supervision  (Send-Out)           Negative EtOH screen  (In-House)         3014F  Screening mammography results documented and reviewed (PV)1  (In-House)         3017F  Colorectal CA screen results documented and reviewed (PV)  (In-House)           Calculated BMI above the upper parameter and a follow-up plan was documented in the medical record  (In-House)         2022F  Dilated retinal eye exam w/interpret by ophthalmologist/optometrist documented/reviewed (DM)4  (In-House)         *  Well woman exam  (In-House)         *  Medicare pap smear  (In-House)         32645  Handlg&/or convey of spec for TR office to lab  (In-House)             Patient Education Handouts:       Physical Exam 50-59 year, Female           Type 2 diabetes     LABORATORY:  Labs ordered to be performed today include Diabetes Panel 1; CMP, Lipid, A1C and Microalbumin.            Orders:       [id number]  DIAB - [facility] LIPID,CMP, A1C: 12511, 50547, 40069  (Send-Out)         13746  MOHSEN [facility] Microablbumin, quantitative  (Send-Out)               Patient Recommendations:        For  Well Woman Exam:                     APPOINTMENT INFORMATION:        Monday Tuesday Wednesday Thursday Friday Saturday Sunday            Time:___________________AM  PM   Date:_____________________             CHARGE CAPTURE:           Primary Diagnosis:     V72.31    Well Woman Exam                Z01.419    Encounter for gynecological examination (general) (routine) without abnormal findings                       Orders:             Negative EtOH screen  (In-House)             3014F   Screening mammography results documented and reviewed (PV)1  (In-House)             3017F   Colorectal CA screen results documented and reviewed (PV)  (In-House)                Calculated BMI above the upper parameter and a follow-up plan was documented in the medical record  (In-House)             2022F   Dilated retinal eye exam w/interpret by  ophthalmologist/optometrist documented/reviewed (DM)4  (In-House)             *   Well woman exam  (In-House)             *   Medicare pap smear  (In-House)             02403   Handlg&/or convey of spec for TR office to lab  (In-House)           V79.0 Screening for depression            Z13.89    Encounter for screening for other disorder    250.00 Type 2 diabetes            E11.42    Type 2 diabetes mellitus with diabetic polyneuropathy        ADDENDUMS:      ____________________________________    Addendum: 02/25/2019 03:28 PM - Ni Bennett         Visit Note Faxed to:        [facility] Stout  (Orthopedics); Number [phone]

## 2021-05-18 NOTE — PROGRESS NOTES
Meliza Arreola 1960     Office/Outpatient Visit    Visit Date: Thu, Nov 7, 2019 11:44 am    Provider: Ne Palmer MD (Assistant: Yeimi Murillo MA)    Location: [facility]        Electronically signed by Ne Palmer MD on  11/07/2019 12:53:00 PM                             SUBJECTIVE:        CC:     Ms. Arreola is a 58 year old White female.  Patient presents today for three month follow up, also has complaints of SOB that started yesterday (not taking Buspirone);         HPI: Meliza is here today to follow up on chronic issues.         She is on hydrocodone/APAP and diclofenac for chronic knee, shoulder, and back pain.  She uses it 2-3x daily.  She has previously tried PT and epidurals both without significant relief.  She is s/p L TKA.  She follows with Dr. Stout.  She does still do her exercises at home.  She is on Lyrica and amitriptyline for diabetic peripheral neuropathy.  The amitriptyline also helps her insomnia.  The Lyrica helps quite a bit with the neuropathic pain in the legs and feet.  However, she is trying to get off of some of her medications and would like to go down to just 150 mg at bedtime only of the Lyrica.  She is currently taking 100 mg QAM and 200 mg QHS.          She is on metformin and Invokana for diabetes.  She is on an ACEI and statin.  She is UTD on foot exam (last done 8/2019).  She is UTD on eye exam, done 8/2019.        She is on losartan/HCTZ for HTN.  BP has been well controlled.  No CP, palpitations.        She has been diagnosed with atrial fibrillation and is now on Eliquis.  S/p pacemaker placement for SSS.  She is hoping to get the Watchman device in January so hopefully she will be able to get off the blood thinner.        She is on Breo Ellipta, Incruse Ellipta, and Spiriva for COPD.  She does have some baseline SOB.  Her SOB has been worse the past 3 days.  She is going to see Pulm later this month.  It comes and goes.  She thinks that  it seems to be worsened by her anxiety.          She is on topiramate, clonazepam, Abilify, and hydroxyzine prn for chronic PTSD.  She follows with Dr. Romero Psychiatry who manages her meds.   She is no longer taking buspirone and the psychiatrist is also weaning her off of the clonazepam.        She is on omeprazole for GERD.  No indigestion or reflux.     ROS:     CONSTITUTIONAL:  Positive for fatigue.   Negative for fever.      EYES:  Negative for blurred vision.      E/N/T:  Negative for diminished hearing, nasal congestion and frequent rhinorrhea.      CARDIOVASCULAR:  Negative for chest pain and palpitations.      RESPIRATORY:  Positive for chronic cough and dyspnea ( with moderate exertion; with coughing spells ).   Negative for recent cough or frequent wheezing.      GASTROINTESTINAL:  Negative for abdominal pain, constipation, diarrhea, nausea and vomiting.      MUSCULOSKELETAL:  Positive for arthralgias, (L shoulder) back pain, joint stiffness and limb pain ( left upper extremity pain ).   Negative for myalgias.      NEUROLOGICAL:  Positive for headaches and paresthesias.   Negative for weakness.      PSYCHIATRIC:  Positive for anxiety, depression, feelings of stress, anhedonia, difficulty concentrating and insomnia.          PMH/FMH/SH:     Last Reviewed on 11/07/2019 11:56 AM by Ne Palmer    Past Medical History:                 PAST MEDICAL HISTORY         Hyperlipidemia    Hypertension     COPD    Sleep Apnea     Chronic Pain     Type 2 Diabetes     Depression    Anxiety    PTSD         PREVENTIVE HEALTH MAINTENANCE             COLORECTAL CANCER SCREENING: Up to date (colonoscopy q10y; sigmoidoscopy q5y; Cologuard q3y) was last done 11/21/2014, Results are in chart; colonoscopy with the following abnormalities noted-- hemorrhoids     EYE EXAM: Diabetic Eye Exam during this calendar year and results are in chart was last done 8/30/2019     MAMMOGRAM: Done within last 2 years and results in are  chart was last done 2/1/2019 with normal results     PAP SMEAR: was last done 2/19/2019 with normal results NIL (no HPV run d/t Medicare pt)         PAST MEDICAL HISTORY                 ADVANCED DIRECTIVES: None         Surgical History:         Cholecystectomy    Joint Replacement: L knee - 11/2015;     Tubal Ligation      L rotator cuff repair - 1/2018;         Family History:         Positive for Coronary Artery Disease ( father; mother ).      Positive for grandparents.          Social History:     Occupation: [employer]     Marital Status:      Children: 2 children         Tobacco/Alcohol/Supplements:     Last Reviewed on 11/07/2019 11:56 AM by Ne Palmer    Tobacco: She has a past history of cigarette smoking; quit date:  2011.          Substance Abuse History:     Last Reviewed on 11/07/2019 11:56 AM by Ne Palmer        Mental Health History:     Last Reviewed on 11/07/2019 11:56 AM by Ne Palmer        Communicable Diseases (eg STDs):     Last Reviewed on 11/07/2019 11:56 AM by Ne Palmer            Current Problems:     Last Reviewed on 8/09/2019 11:09 AM by Ne Palmer    Atrial fibrillation     Dizziness     Memory loss     Peripheral neuropathy     Actinic keratosis     Syncope     Use of high risk medications     Rosacea     GERD     Squamous cell carcinoma of skin of upper limb, including shoulder     Low back pain     Artificial joint replacement, Knee     Obstructive sleep apnea     Type 2 diabetes     COPD     Hyperlipidemia     Chronic PTSD     Depression     Hypertension     Screening for depression     Knee pain         Immunizations:     zzFluzone pf-quadrivalent 3 and up 11/17/2014     zzFluzone pf-quadrivalent 3 and up 11/1/2016         Allergies:     Last Reviewed on 8/09/2019 11:09 AM by Ne Palmer      No Known Drug Allergies.         Current Medications:     Last Reviewed on 8/09/2019 11:09 AM by Ne Palmer    Hydrocodone/Acetaminophen 7.5mg/325mg  Tablet 1 po tid PRN pain     Atorvastatin Calcium 10mg Tablet 1 tab daily     Metformin HCl 500mg Tablets, Extended Release 2 po daily and 1 QHS     Invokana 100mg Tablet 1 tab daily     Spiriva HandiHaler 18mcg/1capsule Capsules for Inhalation Inhale contents of 1 capsule(s) by inhaler device by mouth daily thru inhaler.     Eliquis 5mg Tablet Take 1 tablet(s) by mouth bid     Losartan/Hydrochlorothiazide 50mg/12.5mg Tablet 1 tab daily     Diltiazem HCl 120mg Capsules, Extended Release Take 1 capsule(s) by mouth daily     Topiramate 50mg Capsules, Extended Release Take 1 cap by mouth daily     Amitriptyline HCl 75mg Tablet Take 1 tablet(s) by mouth qHS     Lyrica 100mg Capsules Take 1 capsule(s) by mouth tid     One Touch Ultra Blue Test Strips  Reagent Strips check blood sugar bid  DX E11.9     Dulera 100mcg/5mcg Oral Inhaler Inhale 2 puff(s) by mouth bid     Amiodarone HCl 200mg Tablet 1 tab bid     Clonazepam 0.5mg Tablet 1 bid     Aripiprazole 2mg Tablet 1 tab  am     Buspirone HCl 15mg Tablet 1 tab po q 8 hrs prn anxiety     Hydroxyzine HCl 25mg Tablet 1 tab q8h prn anxiety     diclofenac 75mg 1 BID     Aspirin (ASA) 81mg Tablets, Enteric Coated 1 tab daily         OBJECTIVE:        Vitals:         Current: 11/7/2019 11:48:08 AM    Ht:  5 ft, 4 in;  Wt: 200.8 lbs;  BMI: 34.5    T: 97.8 F (oral);  BP: 139/57 mm Hg (left arm, sitting);  P: 60 bpm (left arm (BP Cuff), sitting);  sCr: 0.85 mg/dL;  GFR: 82.98    O2 Sat: 96 % (room air)        Exams:     PHYSICAL EXAM:     GENERAL: vital signs recorded - well developed, well nourished;  well groomed;  no apparent distress;     EYES: extraocular movements intact; conjunctiva and cornea are normal; PERRLA;     RESPIRATORY: normal respiratory rate and pattern with no distress; normal breath sounds with no rales, rhonchi, wheezes or rubs;     CARDIOVASCULAR: normal rate; rhythm is irregularly irregular;  no systolic murmur; no edema;     GASTROINTESTINAL: nontender;  normal bowel sounds;     MUSCULOSKELETAL: normal gait; normal overall tone bilateral lumbar back TTP;     PSYCHIATRIC: appropriate affect and demeanor; normal psychomotor function; normal speech pattern;         Lab/Test Results:             Amphetamines Screen, Urin:  Negative (11/07/2019),     BAR-Barbiturates Screen, Urin:  Negative (11/07/2019),     Buprenorphine:  Negative (11/07/2019),     BZO-Benzodiazepines Screen,Ur:  Negative (11/07/2019),     Cocaine(Metab.)Screen, Ur:  Negative (11/07/2019),     MDMA-Ecstasy:  Negative (11/07/2019),     Met-Methamphetamine:  Negative (11/07/2019),     MTD-Methadone Screen, Urine:  Negative (11/07/2019),     Opiate Screen, Urine:  Positive (11/07/2019),     OXY-Oxycodone:  Negative (11/07/2019),     PCP-Phencyclidine Screen, Uri:  Negative (11/07/2019),     THC Cannabinoids Screen, Urin:  Negative (11/07/2019),     Urine temperature:  confirmed (11/07/2019),     Date and time of last pill:  Hydrocodone, Clonazepam, Lyrica 11/07/2019 @ 0900 (11/07/2019),     Performed by:  pr (11/07/2019),     Collection Time:  12:36 (11/07/2019),     Hemoglobin A1c:  6.9 (11/07/2019),     Performed by::  cooper (11/07/2019),             Procedures:     Vaccination against other viral diseases, Influenza     1. Influenza, seasonal PF (children 3 years to adult): 0.5 ml unit dose given IM in the left upper arm; administered by AS;  lot number [id number]; expires 06/30/2020 Regarding contraindications to an Influenza vaccine: Denies moderate/severe illness with/without fever; serious reaction to eggs, egg proteins, gentamicin, gelatin, arginine, neomycin or polymixin; serious reaction after recieving previous influenza vaccines; and history of Guillain-Chicken Syndrome.              ASSESSMENT           250.00   E11.42  Type 2 diabetes              DDx:     356.9   E08.42  Peripheral neuropathy              DDx:     719.46   M25.562  Knee pain              DDx:     V58.69   Z79.891  Use of high risk  medications              DDx:     401.1   I10  Hypertension              DDx:     272.4   E78.2  Hyperlipidemia              DDx:     427.31   I48.91  Atrial fibrillation              DDx:     296.20   F32.1  Depression              DDx:     496   J44.9  COPD              DDx:     786.09   R06.02  Shortness of breath              DDx:     780.57   G47.33  Obstructive sleep apnea              DDx:     V04.81   Z23  Vaccination against other viral diseases, Influenza              DDx:         ORDERS:         Lab Orders:       39018  Drug test prsmv qual dir optical obs per day  (In-House)         82269*  Hgb A1c fast lab  (In-House)         APPTO  Appointment need  (In-House)           Procedures Ordered:       63108  Collection of capillary blood specimen (eg, finger, heel, ear stick)  (In-House)           Other Orders:       92283  Noninvasive ear or pulse oximetry for oxygen saturation; single determination  (In-House)         06877  Influenza virus vaccine, quadrivalent, split virus, preservative free 3 years of age & older  (In-House)           Administration of influenza virus vaccine (x1)                 PLAN:          Type 2 diabetes She is on metformin and Invokana for diabetes.  No refills needed.  She is on an ACEI and statin.  She is UTD on foot exam (last done 8/2019).  She is UTD on eye exam, done 8/2019.  Checking A1c today.  RTC 3 months for AWV.     LABORATORY:  Labs ordered to be performed today include Hgb A1c inhouse fast lab.      FOLLOW-UP: Schedule a follow-up visit in 3 months.:.  Medicare wellness 30 min with [place]           Orders:       86433*  Hgb A1c fast lab  (In-House)         APPTO  Appointment need  (In-House)         10410  Collection of capillary blood specimen (eg, finger, heel, ear stick)  (In-House)            Peripheral neuropathy She would like to go down on the Lyrica.  Will change her to 150 mg QHS (down from 100 mg QAM, 200 mg QHS) and see how she does.  I did  encourage her desire to get off some of these meds.  Controlled substance monitoring as below.  At her next visit, if she has done well with the decrease in Lyrica, may consider trying to get her off the amitriptyline as well.          Knee pain Stable on current regimen.  Pain well controlled.  No adverse effects.  She does require ongoing use of this controlled substance to function.  Tox screen and Huber run today.  No refills needed.  RTC 3 months.          Use of high risk medications     Controlled substance documentation: Huber reviewed; drug screen performed and appropriate; consent is reviewed and signed and on the chart.  She is aware of risk of addiction on this medication, understands that she will need to follow up for a review every 3 months and her medications will be adjusted or decreased as deemed appropriate at each visit.  No history of drug or alcohol abuse.  No concerns about diversion or abuse. She denies side effects related to the medication.  She is aware that she may be called in for pill counts.  The dosing of this medication will be reviewed on a regular basis and reduced if possible..  Ongoing use of a controlled substance is necessary for this patient to have a normal quality of life           Orders:       42019  Drug test prsmv qual dir optical obs per day  (In-House)            Hypertension BP at goal.   No refills needed.  Labs reviewed and UTD.          Hyperlipidemia No refills needed.  Labs reviewed and UTD.          Atrial fibrillation No refills needed.  Labs reviewed and UTD.          Depression Following with Dr. Romero.  He is weaning her down off the clonazepam.  Already off the buspirone.          COPD Stable.  Seeing Pulm later this month.  No refills needed.          Shortness of breath           Orders:       75413  Noninvasive ear or pulse oximetry for oxygen saturation; single determination  (In-House)            Obstructive sleep apnea On CPAP.          Vaccination  against other viral diseases, Influenza         IMMUNIZATIONS given today: Influenza Quadrivalent psv free shot 3 & up.            Orders:       39037  Influenza virus vaccine, quadrivalent, split virus, preservative free 3 years of age & older  (In-House)                     Administration of influenza virus vaccine (x1)             Patient Recommendations:        For  Type 2 diabetes:     Schedule a follow-up visit in 3 months.                APPOINTMENT INFORMATION:        Monday Tuesday Wednesday Thursday Friday Saturday Sunday            Time:___________________AM  PM   Date:_____________________             CHARGE CAPTURE           **Please note: ICD descriptions below are intended for billing purposes only and may not represent clinical diagnoses**        Primary Diagnosis:         250.00 Type 2 diabetes            E11.42    Type 2 diabetes mellitus with diabetic polyneuropathy              Orders:          39813   Office/outpatient visit; established patient, level 4  (In-House)             48054*   Hgb A1c fast lab  (In-House)             APPTO   Appointment need  (In-House)             52428   Collection of capillary blood specimen (eg, finger, heel, ear stick)  (In-House)           356.9 Peripheral neuropathy            E08.42    Diabetes mellitus due to underlying condition with diabetic polyneuropathy    719.46 Knee pain            M25.562    Pain in left knee    V58.69 Use of high risk medications            Z79.891    Long term (current) use of opiate analgesic              Orders:          49794   Drug test prsmv qual dir optical obs per day  (In-House)           401.1 Hypertension            I10    Essential (primary) hypertension    272.4 Hyperlipidemia            E78.2    Mixed hyperlipidemia    427.31 Atrial fibrillation            I48.91    Unspecified atrial fibrillation    296.20 Depression            F32.1    Major depressive disorder, single episode, moderate    496 COPD             J44.9    Chronic obstructive pulmonary disease, unspecified    786.09 Shortness of breath            R06.02    Shortness of breath              Orders:          99027   Noninvasive ear or pulse oximetry for oxygen saturation; single determination  (In-House)           780.57 Obstructive sleep apnea            G47.33    Obstructive sleep apnea (adult) (pediatric)    V04.81 Vaccination against other viral diseases, Influenza            Z23    Encounter for immunization              Orders:          84739   Influenza virus vaccine, quadrivalent, split virus, preservative free 3 years of age & older  (In-House)                                           Administration of influenza virus vaccine (x1)

## 2021-05-18 NOTE — PROGRESS NOTES
"Meliza Arreola  1960     Office/Outpatient Visit    Visit Date: Wed, May 6, 2020 03:24 pm    Provider: Ne Palmer MD (Assistant: Geri Penaloza RN)    Location: [facility]        Electronically signed by Ne Palmer MD on  05/06/2020 04:18:09 PM                             Subjective:        CC: Ms. Arreola is a 59 year old White female.  3 month follow up;         HPI: Meliza's telehealth visit today is for f/u on chronic issues.         She is on hydrocodone/APAP and diclofenac for chronic knee, shoulder, and back pain, using it 2-3x daily.  She has previously tried PT and epidurals both without significant relief.  She is s/p L TKA.  She follows with Dr. Stout.  She does still do her exercises at home.  She is on Lyrica and amitriptyline for diabetic peripheral neuropathy.  The amitriptyline also helps her insomnia.  The Lyrica helps quite a bit with the neuropathic pain in the legs and feet.  She is down to just 150 mg at bedtime of the Lyrica.  This is working pretty well for her.  She has continued to have a lot of weakness and pain in the legs.  Give-way sensations bilaterally where the legs \"just give out.\"  We had scheduled her for EMG/NCVs with [facility] but unfortunately that was put on hold due to COVID-19.  She has been doing her exercises faithfully every day.        She is on metformin and Invokana for diabetes.  She is on an ACEI and statin.  She is UTD on foot exam (last done 8/2019).  She is UTD on eye exam, done 8/2019.        She is on losartan/HCTZ for HTN.  BP has been well controlled.  No CP, palpitations.        She has atrial fib and is now on Eliquis.  S/p pacemaker placement for SSS.  She is scheduled to get the Watchman device but that has been postponed until after COVID-19.        She is on Breo Ellipta, Incruse Ellipta, and Spiriva for COPD.  She does have some baseline SOB.  Her SOB has been worse the past 3 days.  She is going to see Pulm later this " "month.  It comes and goes.  She thinks that it seems to be worsened by her anxiety.          She is on topiramate, clonazepam, Abilify, and hydroxyzine prn for chronic PTSD.  She follows with Dr. Romero [facility]iat[facility] who manages her meds.   She is no longer taking buspirone and the psychiatrist is also weaning her off of the clonazepam.  \"I'm a little freaked out over everything but doing fine.\"        She is on omeprazole for GERD.  No heartburn or reflux.    ROS:     CONSTITUTIONAL:  Positive for fatigue.   Negative for fever.      EYES:  Negative for blurred vision.      E/N/T:  Negative for diminished hearing, nasal congestion and frequent rhinorrhea.      CARDIOVASCULAR:  Negative for chest pain and palpitations.      RESPIRATORY:  Positive for chronic cough and dyspnea ( with moderate exertion; with coughing spells ).   Negative for recent cough or frequent wheezing.      GASTROINTESTINAL:  Negative for abdominal pain, constipation, diarrhea, nausea and vomiting.      MUSCULOSKELETAL:  Positive for arthralgias, (L shoulder) back pain, joint stiffness and limb pain ( left upper extremity pain ).   Negative for myalgias.      NEUROLOGICAL:  Positive for headaches and paresthesias.   Negative for weakness.      PSYCHIATRIC:  Positive for anxiety, depression, feelings of stress, anhedonia, difficulty concentrating and insomnia.          Past Medical History / Family History / Social History:         Last Reviewed on 5/06/2020 04:01 PM by Ne Palmer    Past Medical History:                 PAST MEDICAL HISTORY         Hyperlipidemia    Hypertension     COPD    Sleep Apnea     Chronic Pain     Type 2 Diabetes     Depression    Anxiety    PTSD         PREVENTIVE HEALTH MAINTENANCE             BONE DENSITY: has never been done     COLORECTAL CANCER SCREENING: Up to date (colonoscopy q10y; sigmoidoscopy q5y; Cologuard q3y) was last done 11/21/2014, Results are in chart; colonoscopy with the following abnormalities " noted-- hemorrhoids; 11/21/14     EYE EXAM: Diabetic Eye Exam during this calendar year and results are in chart was last done 8/30/2019     MAMMOGRAM: Done within last 2 years and results in are chart was last done 2/28/2020 with normal results     PAP SMEAR: was last done 2/19/2019 with normal results NIL (no HPV run d/t Medicare pt)         PAST MEDICAL HISTORY                 ADVANCED DIRECTIVES: None         PAST MEDICAL HISTORY             CURRENT MEDICAL PROVIDERS:    Cardiologist: NINA BIRD    Ophthalmologist: CELIA    Pulmonologist: NAHED         Surgical History:         Cholecystectomy    Joint Replacement: L knee - 11/2015;     Tubal Ligation     L rotator cuff repair - 1/2018;         Family History:         Positive for Coronary Artery Disease ( father; mother ).      Positive for grandparents.          Social History:     Occupation: [employer]     Marital Status:      Children: 2 children         Tobacco/Alcohol/Supplements:     Last Reviewed on 5/06/2020 04:01 PM by Ne Palmer    Tobacco: She has a past history of cigarette smoking; quit date:  2011.          Substance Abuse History:     Last Reviewed on 5/06/2020 04:01 PM by eN Palmer        Mental Health History:     Last Reviewed on 5/06/2020 04:01 PM by Ne Palmer        Communicable Diseases (eg STDs):     Last Reviewed on 5/06/2020 04:01 PM by Ne Palmer        Current Problems:     Last Reviewed on 2/07/2020 11:01 AM by Ne Palmer    Type 2 diabetes mellitus with diabetic polyneuropathy    HLD - Mixed hyperlipidemia    Depression - Major depressive disorder, single episode, moderate    PTSD - Post-traumatic stress disorder, chronic    Obstructive sleep apnea (adult) (pediatric)    HTN - Essential (primary) hypertension    COPD - Chronic obstructive pulmonary disease, unspecified    Presence of unspecified artificial knee joint    Pain in left knee    Low back pain    Squamous cell carcinoma of skin of  unspecified upper limb, including shoulder    GERD - Gastro-esophageal reflux disease without esophagitis    Rosacea, unspecified    Long term (current) use of opiate analgesic    Actinic keratosis    Other amnesia    Dizziness and giddiness    Unspecified atrial fibrillation    Unspecified chronic gastritis without bleeding    Nausea    Abnormal results of liver function studies    Adverse effect of other primarily systemic and hematological agents, initial encounter    Encounter for other screening for malignant neoplasm of breast    Encounter for screening for depression    Encounter for general adult medical examination without abnormal findings        Immunizations:     Pneumococcal conjugate PCV 13 11/25/2019    zzFluzone pf-quadrivalent 3 and up 11/17/2014    zzFluzone pf-quadrivalent 3 and up 11/1/2016    Fluzone Quadrivalent (3+ years) 11/7/2019        Allergies:     Last Reviewed on 5/06/2020 03:25 PM by Geri Penaloza    metoprolol succinate:   (Adverse Reaction)        Current Medications:     Last Reviewed on 2/07/2020 11:01 AM by Ne Palmer Ellipta 62.5-25 mcg/actuation Inhalation Blister, With Inhalation Device [inhale 1 puff by inhalation route once daily at the same time each day]    HYDROcodone-acetaminophen 7.5-325 mg oral tablet [1 po tid PRN pain]    amitriptyline 75 mg oral tablet [Take 1 tablet(s) by mouth qHS]    losartan-hydrochlorothiazide 50-12.5 mg oral tablet [1 tab daily]    Ventolin HFA 90 mcg/actuation Inhalation HFA Aerosol Inhaler [inhale 2 puffs q 4hrs prn for wheezing/SOA]    atorvastatin 10 mg oral tablet [TAKE 1 TABLET BY MOUTH ONCE DAILY]    Invokana 100 mg oral tablet [Take 1 tablet by mouth once daily]    metFORMIN 500 mg oral Tablet, Extended Release 24 hr [TAKE 2 TABLETS BY MOUTH ONCE DAILY AND THEN TAKE 1 ONCE DAILY AT BEDTIME]    Spiriva HandiHaler 18mcg/1capsule Capsules for Inhalation [Inhale contents of 1 capsule(s) by inhaler device by mouth daily thru  inhaler.]    Hydroxyzine HCl 25 mg oral tablet [1 tab q8h prn anxiety]    Aripiprazole 2 mg oral tablet [1 tab  am]    Lyrica 150 mg oral capsule [TAKE 1 CAPSULE BY MOUTH ONCE DAILY AT BEDTIME]    omeprazole 40 mg oral capsule,delayed release (enteric coated) [Take 1 capsule by mouth once daily]    Topiramate 50 mg oral Capsule, Sprinkle, Extended Release 24 hr Dose Pack [Take 1 cap by mouth daily]    Eliquis 5mg Tablet [Take 1 tablet(s) by mouth bid]    Diltiazem HCl 120 mg oral Capsule, Extended Release 24 hr [Take 1 capsule(s) by mouth daily]    sucralfate 1 gram oral tablet [TAKE 1 TABLET BY MOUTH TWICE DAILY ON AN EMPTY STOMACH]    promethazine 12.5 mg oral tablet [Take 1 tablet by mouth twice daily as needed]        Objective:        Exams:     PHYSICAL EXAM:     GENERAL: well developed, well nourished;  well groomed;  no apparent distress;     EYES: lids and lacrimal system are normal in appearance; extraocular movements intact; conjunctiva and cornea are normal;     E/N/T: OROPHARYNX:  normal mucosa, dentition, gingiva, and posterior pharynx;     RESPIRATORY: normal respiratory rate and pattern with no distress;     MUSCULOSKELETAL: normal overall tone     NEUROLOGIC: mental status: alert and oriented x 3;     PSYCHIATRIC: appropriate affect and demeanor; normal psychomotor function; normal speech pattern;         Assessment:         M54.5   Low back pain       Z79.891   Long term (current) use of opiate analgesic       E11.42   Type 2 diabetes mellitus with diabetic polyneuropathy       I10   HTN - Essential (primary) hypertension       E78.2   HLD - Mixed hyperlipidemia       I48.91   Unspecified atrial fibrillation       F32.1   Depression - Major depressive disorder, single episode, moderate       F43.12   PTSD - Post-traumatic stress disorder, chronic           ORDERS:         Meds Prescribed:       [Refilled] HYDROcodone-acetaminophen 7.5-325 mg oral tablet [1 po tid PRN pain], #75 (seventy five)  tablets, Refills: 0 (zero)         Lab Orders:       APPTO  Appointment need  (In-House)                      Plan:         Low back painStable on current regimen.  Sx well controlled.  No adverse effects.  She does require ongoing use of this controlled substance to function.  She has weaned down well on the Lyrica.  Prior tox screen appropriate; no tox run today due to COVID-19.  Huber run today.  Refill is a bit early, but she is trying to batch the pick-ups on her meds to minimize trips out and is planning to go tomorrow, so I will go ahead and send that Norco rx a couple of days early.  RTC 3 months.    Telehealth: Verbal consent obtained for visit to occur via televideo conferencing; Staff, other than provider, present during telephone visit include Geri Penaloza RN     FOLLOW-UP: Schedule a follow-up visit in 3 months.:.  f/u low back pain with [facility]          Prescriptions:       [Refilled] HYDROcodone-acetaminophen 7.5-325 mg oral tablet [1 po tid PRN pain], #75 (seventy five) tablets, Refills: 0 (zero)           Orders:       APPTO  Appointment need  (In-House)              Long term (current) use of opiate analgesic    Controlled substance documentation: Hub[facility] reviewed; prior drug screen consistent; consent is reviewed and signed and on the chart.  She is aware of risk of addiction on this medication, understands that she will need to follow up for a review every 3 months and her medications will be adjusted or decreased as deemed appropriate at each visit.  No history of drug or alcohol abuse.  No concerns about diversion or abuse. She denies side effects related to the medication.  She is aware that she may be called in for pill counts.  The dosing of this medication will be reviewed on a regular basis and reduced if possible..  Ongoing use of a controlled substance is necessary for this patient to have a normal quality of life         Type 2 diabetes mellitus with diabetic polyneuropathyShe is on  metformin and Invokana for diabetes.  She is on an ACEI and statin.  She is UTD on foot exam (last done 8/2019).  She is UTD on eye exam, done 8/2019.  Labs deferred to next visit.        HTN - Essential (primary) hypertensionStable.  No refills needed.        HLD - Mixed hyperlipidemiaStable.  No refills needed.        Unspecified atrial fibrillationStable.  No refills needed.        Depression - Major depressive disorder, single episode, moderateStable.  No refills needed.        PTSD - Post-traumatic stress disorder, chronicStable.  No refills needed.            Patient Recommendations:        For  Low back pain:    Schedule a follow-up visit in 3 months.                APPOINTMENT INFORMATION:        Monday Tuesday Wednesday Thursday Friday Saturday Sunday            Time:___________________AM  PM   Date:_____________________             Charge Capture:         Primary Diagnosis:     M54.5  Low back pain           Orders:      [id number]  Office/outpatient visit; established patient, level 4  (In-House)            APPTO  Appointment need  (In-House)              Z79.891  Long term (current) use of opiate analgesic     E11.42  Type 2 diabetes mellitus with diabetic polyneuropathy     I10  HTN - Essential (primary) hypertension     E78.2  HLD - Mixed hyperlipidemia     I48.91  Unspecified atrial fibrillation     F32.1  Depression - Major depressive disorder, single episode, moderate     F43.12  PTSD - Post-traumatic stress disorder, chronic

## 2021-05-18 NOTE — PROGRESS NOTES
Meliza Arreola 1960     Office/Outpatient Visit    Visit Date: Fri, Aug 9, 2019 10:59 am    Provider: Ne Palmer MD (Assistant: Yeimi Murillo MA)    Location: [facility]        Electronically signed by Ne Palmer MD on  08/09/2019 12:41:25 PM                             SUBJECTIVE:        CC:     Ms. Arreola is a 58 year old White female.  Patient  presents today for three month follow up;         HPI:     Meliza is here today for routine f/u on chronic issues.         She is on hydrocodone/APAP and diclofenac for chronic knee, shoulder, and back pain.  She uses 2-3 tabs per day of [place].  She has previously tried PT and epidurals both without significant relief.  She is s/p L TKA.  She follows with Dr. Stout.  She does still do her exercises at home.  She is on Lyrica and amitriptyline for diabetic peripheral neuropathy.  The amitriptyline also helps her insomnia.  The Lyrica helps quite a bit with the neuropathic pain in the legs and feet.        She is on metformin and Invokana for diabetes.  She is due for labs.  She is on an ACEI and statin.  She is due for foot exam (last done 9/2018).  She is UTD on eye exam, done by Dr. Nair in [place] 10/2018.        She is on losartan/HCTZ for HTN.  BP has been well controlled.  No CP, palpitations.        She has been diagnosed with atrial fibrillation and is now on Eliquis.  s/p pacemaker placement for SSS.        She is on Breo Ellipta, Incruse Ellipta, and Spiriva for COPD.  She does have some baseline SOB.  That seems to be worse this summer with the heat and humidity.        She is on topiramate, clonazepam, Abilify, buspirone and hydroxyzine prn for chronic PTSD.  She follows with Dr. Romero Psychiatry who manages her meds.         She is on omeprazole for GERD.  No indigestion or reflux.     ROS:     CONSTITUTIONAL:  Positive for fatigue.   Negative for fever.      EYES:  Negative for blurred vision.      E/N/T:   Negative for diminished hearing, nasal congestion and frequent rhinorrhea.      CARDIOVASCULAR:  Negative for chest pain and palpitations.      RESPIRATORY:  Positive for chronic cough and dyspnea ( with moderate exertion; with coughing spells ).   Negative for recent cough or frequent wheezing.      GASTROINTESTINAL:  Negative for abdominal pain, constipation, diarrhea, nausea and vomiting.      MUSCULOSKELETAL:  Positive for arthralgias, (L shoulder) back pain, joint stiffness and limb pain ( left upper extremity pain ).   Negative for myalgias.      NEUROLOGICAL:  Positive for headaches and paresthesias.   Negative for weakness.      PSYCHIATRIC:  Positive for anxiety, depression, feelings of stress, anhedonia, difficulty concentrating and insomnia.          PMH/FMH/SH:     Last Reviewed on 8/09/2019 11:09 AM by Ne Palmer    Past Medical History:                 PAST MEDICAL HISTORY         Hyperlipidemia    Hypertension     COPD    Sleep Apnea     Chronic Pain     Type 2 Diabetes     Depression    Anxiety    PTSD         PREVENTIVE HEALTH MAINTENANCE             COLORECTAL CANCER SCREENING: Up to date (colonoscopy q10y; sigmoidoscopy q5y; Cologuard q3y) was last done 11/21/2014, Results are in chart; colonoscopy with the following abnormalities noted-- hemorrhoids     EYE EXAM: Diabetic Eye Exam during this calendar year and results are in chart was last done 10/29/2018     MAMMOGRAM: Done within last 2 years and results in are chart was last done 2/1/2019 with normal results     PAP SMEAR: was last done 2/19/2019 with normal results NIL (no HPV run d/t Medicare pt)         PAST MEDICAL HISTORY                 ADVANCED DIRECTIVES: None         Surgical History:         Cholecystectomy    Joint Replacement: L knee - 11/2015;     Tubal Ligation      L rotator cuff repair - 1/2018;         Family History:         Positive for Coronary Artery Disease ( father; mother ).      Positive for grandparents.           Social History:     Occupation: [employer]     Marital Status:      Children: 2 children         Tobacco/Alcohol/Supplements:     Last Reviewed on 8/09/2019 11:09 AM by Ne Palmer    Tobacco: She has a past history of cigarette smoking; quit date:  2011.          Substance Abuse History:     Last Reviewed on 8/09/2019 11:09 AM by Ne Palmer        Mental Health History:     Last Reviewed on 8/09/2019 11:09 AM by Ne Palmer        Communicable Diseases (eg STDs):     Last Reviewed on 8/09/2019 11:09 AM by Ne Palmer            Current Problems:     Last Reviewed on 6/10/2019 11:50 AM by Ne Palmer    Atrial fibrillation     Dizziness     Memory loss     Peripheral neuropathy     Actinic keratosis     Syncope     Use of high risk medications     Rosacea     GERD     Squamous cell carcinoma of skin of upper limb, including shoulder     Low back pain     Artificial joint replacement, Knee     Obstructive sleep apnea     Type 2 diabetes     COPD     Hyperlipidemia     Chronic PTSD     Depression     Hypertension     Screening for depression     Knee pain         Immunizations:     zzFluzone pf-quadrivalent 3 and up 11/17/2014     zzFluzone pf-quadrivalent 3 and up 11/1/2016         Allergies:     Last Reviewed on 6/10/2019 11:50 AM by Ne Palmer      No Known Drug Allergies.         Current Medications:     Last Reviewed on 6/10/2019 11:50 AM by Ne Palmer    Hydrocodone/Acetaminophen 7.5mg/325mg Tablet 1 po tid PRN pain     Atorvastatin Calcium 10mg Tablet 1 tab daily     Spiriva HandiHaler 18mcg/1capsule Capsules for Inhalation Inhale contents of 1 capsule(s) by inhaler device by mouth daily thru inhaler.     Metformin HCl 500mg Tablets, Extended Release 2 po daily and 1 QHS     Eliquis 5mg Tablet Take 1 tablet(s) by mouth bid     Losartan/Hydrochlorothiazide 50mg/12.5mg Tablet 1 tab daily     Invokana 100mg Tablet 1 tab daily     Diltiazem HCl 120mg Capsules, Extended Release  Take 1 capsule(s) by mouth daily     Topiramate 50mg Capsules, Extended Release Take 1 cap by mouth daily     Amitriptyline HCl 75mg Tablet Take 1 tablet(s) by mouth qHS     One Touch Ultra Blue Test Strips  Reagent Strips check blood sugar bid  DX E11.9     Dulera 100mcg/5mcg Oral Inhaler Inhale 2 puff(s) by mouth bid     Lyrica 100mg Capsules Take 1 capsule(s) by mouth tid     Amiodarone HCl 200mg Tablet 1 tab bid     Clonazepam 0.5mg Tablet 1 bid     Aripiprazole 2mg Tablet 1 tab  am     Buspirone HCl 15mg Tablet 1 tab po q 8 hrs prn anxiety     Hydroxyzine HCl 25mg Tablet 1 tab q8h prn anxiety     diclofenac 75mg 1 BID     Aspirin (ASA) 81mg Tablets, Enteric Coated 1 tab daily         OBJECTIVE:        Vitals:         Current: 8/9/2019 11:01:23 AM    Ht:  5 ft, 4 in;  Wt: 198.8 lbs;  BMI: 34.1    T: 98.2 F (oral);  BP: 105/63 mm Hg (left arm, sitting);  P: 61 bpm (left arm (BP Cuff), sitting);  sCr: 0.84 mg/dL;  GFR: 83.62    O2 Sat: 94 % (room air)        Exams:     PHYSICAL EXAM:     GENERAL: vital signs recorded - well developed, well nourished;  well groomed;  no apparent distress;     EYES: extraocular movements intact; conjunctiva and cornea are normal; PERRLA;     RESPIRATORY: normal respiratory rate and pattern with no distress; normal breath sounds with no rales, rhonchi, wheezes or rubs;     CARDIOVASCULAR: normal rate; rhythm is irregularly irregular;  no systolic murmur; no edema;     GASTROINTESTINAL: nontender; normal bowel sounds;     MUSCULOSKELETAL: normal gait; normal overall tone     PSYCHIATRIC: appropriate affect and demeanor; normal psychomotor function; normal speech pattern;     Left foot exam    Protective sensation using Monofilament test: Decreased, with estimated force of 2 grams applied.    Vascular status: normal peripheral vascular exam with palpable dorsal pedal and posterior tibal pulses and brisk digital capillary refill    Skin is intact without sores or ulcers    Right foot  exam    Protective sensation using Monofilament test: Decreased, with estimated force of 2 grams applied.    Vascular status: normal peripheral vascular exam with palpable dorsal pedal and posterior tibal pulses and brisk digital capillary refill    Skin is intact without sores or ulcers         Lab/Test Results:             Amphetamines Screen, Urin:  Negative (08/09/2019),     BAR-Barbiturates Screen, Urin:  Negative (08/09/2019),     Buprenorphine:  Negative (08/09/2019),     BZO-Benzodiazepines Screen,Ur:  Negative (08/09/2019),     Cocaine(Metab.)Screen, Ur:  Negative (08/09/2019),     MDMA-Ecstasy:  Negative (08/09/2019),     Met-Methamphetamine:  Negative (08/09/2019),     MTD-Methadone Screen, Urine:  Negative (08/09/2019),     Opiate Screen, Urine:  Positive (08/09/2019),     OXY-Oxycodone:  Negative (08/09/2019),     PCP-Phencyclidine Screen, Uri:  Negative (08/09/2019),     THC Cannabinoids Screen, Urin:  Negative (08/09/2019),     Urine temperature:  confirmed (08/09/2019),     Date and time of last pill:  Hyodrocodone and Clonazepam 08/09/2019 @ 0900/AS (08/09/2019),     Performed by:  angela (08/09/2019),     Collection Time:  1125 (08/09/2019),             ASSESSMENT           719.46   M25.562  Knee pain              DDx:     356.9   E08.42  Peripheral neuropathy              DDx:     V58.69   Z79.891  Use of high risk medications              DDx:     250.00   E11.42  Type 2 diabetes              DDx:     401.1   I10  Hypertension              DDx:     272.4   E78.2  Hyperlipidemia              DDx:     427.31   I48.91  Atrial fibrillation              DDx:     296.20   F32.1  Depression              DDx:     496   J44.9  COPD              DDx:     530.81   K21.9  GERD              DDx:     780.57   G47.33  Obstructive sleep apnea              DDx:         ORDERS:         Radiology/Test Orders:       3014F  Screening mammography results documented and reviewed (PV)1  (In-House)         3017F  Colorectal  CA screen results documented and reviewed (PV)  (In-House)         2022F  Dilated retinal eye exam w/interpret by ophthalmologist/optometrist documented/reviewed (DM)4  (In-House)           Lab Orders:       50928  DIAB1 - H LIPID,CMP, A1C: 30361, 49024, 65845  (Send-Out)         87054  Drug test prsmv qual dir optical obs per day  (In-House)         APPTO  Appointment need  (In-House)           Procedures Ordered:       REFER  Referral to Specialist or Other Facility  (Send-Out)           Other Orders:       70784  Noninvasive ear or pulse oximetry for oxygen saturation; single determination  (In-House)         2028F  Foot examination performed (includes examination through visual inspection, sensory exam with monofi  (In-House)                   PLAN:          Knee pain She is stable on her current regimen.  Pain is well controlled.  No adverse effects.  She does require ongoing use of this controlled substance to function.  Tox screen and [place] run today.  No refills needed today.  RTC 3 months.     MIPS Vaccines Flu and Pneumonia updated in Shot record Screening mammomgram done within last 2 years and results in are chart Colorectal Cancer Screening is up to date and the results are in the chart   Diabetic Eye Exam during this calendar year and results are in chart     FOLLOW-UP: Schedule a follow-up visit in 3 months..  f/u knee pain with [place]           Orders:       3014F  Screening mammography results documented and reviewed (PV)1  (In-House)         3017F  Colorectal CA screen results documented and reviewed (PV)  (In-House)         2022F  Dilated retinal eye exam w/interpret by ophthalmologist/optometrist documented/reviewed (DM)4  (In-House)         APPTO  Appointment need  (In-House)            Peripheral neuropathy Stable on Lyrica.  It controls her neuropathic pain well.  No adverse effects.  Controlled substance monitoring as below.  No refills needed today.          Use of high risk medications      Controlled substance documentation: [place] reviewed; drug screen performed and appropriate; consent is reviewed and signed and on the chart.  She is aware of risk of addiction on this medication, understands that she will need to follow up for a review every 3 months and her medications will be adjusted or decreased as deemed appropriate at each visit.  No history of drug or alcohol abuse.  No concerns about diversion or abuse. She denies side effects related to the medication.  She is aware that she may be called in for pill counts.  The dosing of this medication will be reviewed on a regular basis and reduced if possible..  Ongoing use of a controlled substance is necessary for this patient to have a normal quality of life           Orders:       48793  Drug test prsmv qual dir optical obs per day  (In-House)            Type 2 diabetes She is on metformin and Invokana for diabetes.  No refills needed.  She is due for labs; ordered.  She is on an ACEI and statin.  Foot exam today shows decreased sensation.  She is UTD on eye exam, done by Dr. Nair in [facility] 10/2018.     LABORATORY:  Labs ordered to be performed today include Diabetes Panel 1; CMP, Lipid, A1C.            Orders:       27419  DIAB[address] [place] [address] LIPID,CMP, A1C: 93214, 05303, 98381  (Send-Out)            Hypertension BP at goal.  No refills needed.  Checking labs.          Hyperlipidemia No refills needed.  Checking labs.          Atrial fibrillation No refills needed.  Checking labs.          Depression She continues on meds with Dr. Tian Terrell.  Having a lot of tension headaches lately.  Recommended some behavioral interventions to try as I would like to avoid adding yet another CNS-active medication.  She is on topiramate through her psychiatrist, actually, which could be increased, but as these headaches really are more consistent with tension headaches (and not migraines), I think stress management will be more effective for her.           COPD Her sleep specialist told her he is getting out of general Pulm, so we will get her in with Dr. Mcfarlane to take over management of her COPD.  It is under fairly good control right now, but given all of her cardiac issues, I would feel more comfortable if she had a dedicated pulmonologist to help us if she has respiratory issues that emerge.         REFERRALS:  Referral initiated to a pulmonologist ( Dr. Flynn Mcfarlane, , [facility]; for evaluation of COPD -- previously was being  managed by her sleep specialist but he is no longer doing general Pulmonology ).            Orders:       02895  Noninvasive ear or pulse oximetry for oxygen saturation; single determination  (In-House)         REFER  Referral to Specialist or Other Facility  (Send-Out)            GERD Stable.           Obstructive sleep apnea Compliant with CPAP.             Other Orders:       2028F  Foot examination performed (includes examination through visual inspection, sensory exam with monofi  (In-House)           Patient Recommendations:        For  Knee pain:     Schedule a follow-up visit in 3 months.                APPOINTMENT INFORMATION:        Monday Tuesday Wednesday Thursday Friday Saturday Sunday            Time:___________________AM  PM   Date:_____________________             CHARGE CAPTURE           **Please note: ICD descriptions below are intended for billing purposes only and may not represent clinical diagnoses**        Primary Diagnosis:         719.46 Knee pain            M25.562    Pain in left knee              Orders:          27858   Office/outpatient visit; established patient, level 4  (In-House)             3014F   Screening mammography results documented and reviewed (PV)1  (In-House)             3017F   Colorectal CA screen results documented and reviewed (PV)  (In-House)             2022F   Dilated retinal eye exam w/interpret by ophthalmologist/optometrist documented/reviewed (DM)4   (In-House)             APPTO   Appointment need  (In-House)           356.9 Peripheral neuropathy            E08.42    Diabetes mellitus due to underlying condition with diabetic polyneuropathy    V58.69 Use of high risk medications            Z79.891    Long term (current) use of opiate analgesic              Orders:          33120   Drug test prsmv qual dir optical obs per day  (In-House)           250.00 Type 2 diabetes            E11.42    Type 2 diabetes mellitus with diabetic polyneuropathy    401.1 Hypertension            I10    Essential (primary) hypertension    272.4 Hyperlipidemia            E78.2    Mixed hyperlipidemia    427.31 Atrial fibrillation            I48.91    Unspecified atrial fibrillation    296.20 Depression            F32.1    Major depressive disorder, single episode, moderate    496 COPD            J44.9    Chronic obstructive pulmonary disease, unspecified              Orders:          91886   Noninvasive ear or pulse oximetry for oxygen saturation; single determination  (In-House)           530.81 GERD            K21.9    Gastro-esophageal reflux disease without esophagitis    780.57 Obstructive sleep apnea            G47.33    Obstructive sleep apnea (adult) (pediatric)        Other Orders:           2028F   Foot examination performed (includes examination through visual inspection, sensory exam with monofi  (In-House)

## 2021-05-18 NOTE — PROGRESS NOTES
Meliza Arreola. 1960     Office/Outpatient Visit    Visit Date: Wed, Nov 21, 2018 10:51 am    Provider: Ne Palmer MD (Assistant: Yeimi Murillo MA)    Location: [facility]        Electronically signed by Ne Palmer MD on  11/21/2018 11:14:32 AM                             Electronically signed by Ne Palmer MD on  11/21/2018 11:14:38 AM                             SUBJECTIVE:        CC:     Ms. Arreola is a 57 year old White female.  presents today due to complaints of lower abdominal pain X 6 months         HPI: Meliza is here today with complaint of lower abdominal pain for the past 6 months (or longer).  Her abdomen starts cramping.  She gets a sense of urgency and then has very large volume BM.  Although it is occasionally diarrhea, usually the stool is well formed.  Defecation relieves the pain.  She might have 3 of these bowel movements per week.  In between, BMs are regular (or diarrhea).  She has had fecal incontinence in the past due to the urgency.  It is foul-smelling.  She has not noticed any dietary triggers.  No hematochezia or melena.  +Nausea but no vomiting.  The nausea is associated with the cramping.          Labwork done over the past 6 months has been unremarkable.  Last colonoscopy was done 11/2014 and was normal at that time except for hemorrhoids.     ROS:     CONSTITUTIONAL:  Positive for fatigue.   Negative for fever.      EYES:  Negative for blurred vision.      CARDIOVASCULAR:  Negative for chest pain and palpitations.      RESPIRATORY:  Positive for chronic cough and dyspnea ( with moderate exertion ).   Negative for recent cough or frequent wheezing.      GASTROINTESTINAL:  Negative for abdominal pain, constipation, diarrhea, nausea and vomiting.          PMH/FMH/SH:     Last Reviewed on 11/21/2018 10:58 AM by Ne Palmer    Past Medical History:                 PAST MEDICAL HISTORY         Hyperlipidemia    Hypertension     COPD    Sleep  Apnea     Chronic Pain     Type 2 Diabetes     Depression    Anxiety    PTSD         PREVENTIVE HEALTH MAINTENANCE             COLORECTAL CANCER SCREENING: Up to date (colonoscopy q10y; sigmoidoscopy q5y; Cologuard q3y) was last done 11/21/2014, Results are in chart; colonoscopy with the following abnormalities noted-- hemorrhoids     EYE EXAM: Diabetic Eye Exam during this calendar year and results are in chart was last done 8/29/2018     MAMMOGRAM: was last done 9/28/16 with normal results     PAP SMEAR: was last done 8/2014 with normal results         PAST MEDICAL HISTORY                 ADVANCED DIRECTIVES: None         Surgical History:         Cholecystectomy    Joint Replacement: L knee - 11/2015;     Tubal Ligation      L rotator cuff repair - 1/2018;         Family History:         Positive for Coronary Artery Disease ( father; mother ).      Positive for grandparents.          Social History:     Occupation: [employer]     Marital Status:      Children: 2 children         Tobacco/Alcohol/Supplements:     Last Reviewed on 11/21/2018 10:58 AM by Ne Palmer    Tobacco: She has a past history of cigarette smoking; quit date:  2011.          Substance Abuse History:     Last Reviewed on 11/21/2018 10:58 AM by Ne Palmer        Mental Health History:     Last Reviewed on 11/21/2018 10:58 AM by Ne Palmer        Communicable Diseases (eg STDs):     Last Reviewed on 11/21/2018 10:58 AM by Ne Palmer            Current Problems:     Last Reviewed on 9/18/2018 01:45 PM by Ne Palmer    Memory loss     Peripheral neuropathy     Actinic keratosis     Syncope     Use of high risk medications     Rosacea     GERD     Squamous cell carcinoma of skin of upper limb, including shoulder     Low back pain     Artificial joint replacement, Knee     Obstructive sleep apnea     Type 2 diabetes     COPD     Hyperlipidemia     Chronic PTSD     Depression     Hypertension     Knee pain          Immunizations:     zzFluzone pf-quadrivalent 3 and up 11/17/2014     zzFluzone pf-quadrivalent 3 and up 11/1/2016         Allergies:     Last Reviewed on 9/18/2018 01:45 PM by Ne Palmer      No Known Drug Allergies.         Current Medications:     Last Reviewed on 9/18/2018 01:45 PM by Ne Palmer    Invokana 100mg Tablet 1 tab daily     Hydrocodone/Acetaminophen 7.5mg/325mg Tablet 1 po tid PRN pain     Amitriptyline HCl 75mg Tablet Take 1 tablet by mouth qHS     Losartan/Hydrochlorothiazide 50mg/12.5mg Tablet 1 tab daily     Atorvastatin Calcium 10mg Tablet 1 tab daily     Breo Ellipta 100mcg/25mcg Inhalation Powder Take 1 inhalation(s) by mouth daily     Topiramate 50mg Capsules, Extended Release Take 1 cap by mouth daily     Lyrica 50mg Capsules Take 1 capsule(s) by mouth bid     Incruse Ellipta 62.5mcg/1blister Inhalation Powder Inhale 1 inhalation(s) by mouth daily     Aripiprazole 2mg Tablet 1 tab  am     Buspirone HCl 15mg Tablet 1 tab po q 8 hrs prn anxiety     Hydroxyzine HCl 25mg Tablet 1 tab q8h prn anxiety     diclofenac 75mg 1 BID     Venlafaxine HCl 150mg Capsules, Extended Release 1 tab daily     Venlafaxine HCl 75mg Capsules, Extended Release 1 cap daily     Aspirin (ASA) 81mg Tablets, Enteric Coated 1 tab daily         OBJECTIVE:        Vitals:         Current: 11/21/2018 10:55:28 AM    Ht:  5 ft, 4 in;  Wt: 214.4 lbs;  BMI: 36.8    T: 97.9 F (oral);  BP: 120/62 mm Hg (left arm, sitting);  P: 77 bpm (left arm (BP Cuff), sitting);  sCr: 0.87 mg/dL;  GFR: 84.33        Exams:     PHYSICAL EXAM:     GENERAL: vital signs recorded - well developed, well nourished;  well groomed;  no apparent distress;     EYES: extraocular movements intact; conjunctiva and cornea are normal; PERRLA;     E/N/T: OROPHARYNX:  normal mucosa, dentition, gingiva, and posterior pharynx;     RESPIRATORY: normal respiratory rate and pattern with no distress; normal breath sounds with no rales, rhonchi, wheezes or rubs;      CARDIOVASCULAR: normal rate; rhythm is regular;  no edema;     GASTROINTESTINAL: nontender; normal bowel sounds;     MUSCULOSKELETAL: gait: antalgic;  normal overall tone         ASSESSMENT           789.00   R10.84  Abdominal pain, unspecified              DDx:     787.91   R19.7  Diarrhea              DDx:         ORDERS:         Lab Orders:       [id number]  HPUBT - [facility] H.pylori Breath test  (Send-Out)         12095  STLC - [facility] Stool Culture  (Send-Out)         73744  OBIA - [facility] Occult blood by immunoassay  (Send-Out)                   PLAN:          Abdominal pain, unspecified Mild RUQ and epigastric TTP.  Checking H. pylori as below as well stool samples.  If H. pylori and stool samples are all negative, will consider getting a RUQ U/S and/or starting a PPI.  Hopefully she will not need to go back for colonoscopy so soon, but we can do that as well if needed.     LABORATORY:  Labs ordered to be performed today include H. pylori breath test.            Orders:       [id number]  HPUBT - [facility] H.pylori Breath test  (Send-Out)             Patient Education Handouts:       Abdominal Pain           Diarrhea As above.     LABORATORY:  Labs ordered to be performed today include stool culture and Hemocult, Routine Screening.            Orders:       [id number]  STLC - [facility] Stool Culture  (Send-Out)         44885  OBIA - [facility] Occult blood by immunoassay  (Send-Out)               CHARGE CAPTURE           **Please note: ICD descriptions below are intended for billing purposes only and may not represent clinical diagnoses**        Primary Diagnosis:         789.00 Abdominal pain, unspecified            R10.84    Generalized abdominal pain              Orders:          [id number]   Office/outpatient visit; established patient, level 4  (In-House)           787.91 Diarrhea            R19.7    Diarrhea, unspecified

## 2021-05-18 NOTE — PROGRESS NOTES
Meliza Arreola. 1960     Office/Outpatient Visit    Visit Date: Fri, Jan 25, 2019 04:06 pm    Provider: Ne Palmer MD (Assistant: Radha Bates)    Location: [facility]        Electronically signed by Ne Palmer MD on  01/25/2019 04:56:27 PM                             SUBJECTIVE:        CC:     Ms. Arreola is a 58 year old White female.  doctor weaned pt off Effexor, states outbursts, dizziness no passing out, losing balance, ongoing about 6-7 months;         HPI: Meliza is here today to follow up on dizziness.        She has been having problems with anxiety.  She got some bad news about her disability this morning. Her son's best friend drowned in the [place] also.  She has had problems following weaning off Effexor with her psychiatrist.  She has been off of it for a couple of weeks now.  He started her on Abilify to replace it.  She has been having outbursts.  +Crying spells.  +Unable to sleep.  She was seen in the ED on 1-2-19 and then the next day she got in with her psychiatrist.  That's when he started the Abilify and also increased clonazepam.          +Dizziness but no syncope.  +Ataxia.  She has fallen twice.  Theh Effexor was weaned because they thought it might be causing the dizziness but stopping it has had no effect on the dizziness.  She gets lightheaded and sometimes the room spins.  It can come on when she is lying in bed, but it is not associated with positional changes.  It happens nightly.        She is having balance issues.  Her diabetic neuropathy seems to be worsening.  The pain and paresthesias seem to be moving up the leg.  She is on Lyrica and this is still helping but it is not helping so much anymore.  She is having a lot of pain in the R arm and back and knees.  She is using the hydrocodone/APAP.  That helps but she only gets #70 per month and so she she tends to run out by the end of the month. She wonders if we could increase her number of tabs  per month just a little bit.        She has been having breathing issues.  She is having R arm pain from the wrist to the shoulder.      ROS:     CONSTITUTIONAL:  Positive for fatigue.   Negative for fever.      EYES:  Negative for blurred vision.      CARDIOVASCULAR:  Negative for chest pain and palpitations.      RESPIRATORY:  Positive for chronic cough and dyspnea.   Negative for recent cough or frequent wheezing.      MUSCULOSKELETAL:  Positive for arthralgias, (L shoulder) back pain, joint stiffness and limb pain ( left upper extremity pain ).   Negative for myalgias.      NEUROLOGICAL:  Positive for ataxia, dizziness, memory loss and paresthesia ( bilateral lower extremity ).   Negative for fainting, headaches or weakness.      PSYCHIATRIC:  Positive for anxiety, depression, feelings of stress, anhedonia, difficulty concentrating and insomnia.          PMH/FMH/SH:     Last Reviewed on 1/25/2019 04:37 PM by Ne Palmer    Past Medical History:                 PAST MEDICAL HISTORY         Hyperlipidemia    Hypertension     COPD    Sleep Apnea     Chronic Pain     Type 2 Diabetes     Depression    Anxiety    PTSD         PREVENTIVE HEALTH MAINTENANCE             COLORECTAL CANCER SCREENING: Up to date (colonoscopy q10y; sigmoidoscopy q5y; Cologuard q3y) was last done 11/21/2014, Results are in chart; colonoscopy with the following abnormalities noted-- hemorrhoids     EYE EXAM: Diabetic Eye Exam during this calendar year and results are in chart was last done 10/29/2018     MAMMOGRAM: was last done 9/28/16 with normal results     PAP SMEAR: was last done 8/2014 with normal results         PAST MEDICAL HISTORY                 ADVANCED DIRECTIVES: None         Surgical History:         Cholecystectomy    Joint Replacement: L knee - 11/2015;     Tubal Ligation      L rotator cuff repair - 1/2018;         Family History:         Positive for Coronary Artery Disease ( father; mother ).      Positive for  grandparents.          Social History:     Occupation: [employer]     Marital Status:      Children: 2 children         Tobacco/Alcohol/Supplements:     Last Reviewed on 1/25/2019 04:37 PM by Ne Palmer    Tobacco: She has a past history of cigarette smoking; quit date:  2011.          Substance Abuse History:     Last Reviewed on 1/25/2019 04:37 PM by Ne Palmer        Mental Health History:     Last Reviewed on 1/25/2019 04:37 PM by Ne Palmer        Communicable Diseases (eg STDs):     Last Reviewed on 1/25/2019 04:37 PM by Ne Palmer            Current Problems:     Last Reviewed on 12/28/2018 11:18 AM by Ne Palmer    Memory loss     Peripheral neuropathy     Actinic keratosis     Syncope     Use of high risk medications     Rosacea     GERD     Squamous cell carcinoma of skin of upper limb, including shoulder     Low back pain     Artificial joint replacement, Knee     Obstructive sleep apnea     Type 2 diabetes     COPD     Hyperlipidemia     Chronic PTSD     Depression     Hypertension     Screening mammogram - other     Screening for depression     Knee pain         Immunizations:     zzFluzone pf-quadrivalent 3 and up 11/17/2014     zzFluzone pf-quadrivalent 3 and up 11/1/2016         Allergies:     Last Reviewed on 12/28/2018 11:18 AM by Ne Palmer      No Known Drug Allergies.         Current Medications:     Last Reviewed on 12/28/2018 11:18 AM by Ne Palmer    Amitriptyline HCl 75mg Tablet Take 1 tablet by mouth qHS     Breo Ellipta 100mcg/25mcg Inhalation Powder Take 1 inhalation(s) by mouth daily     Losartan/Hydrochlorothiazide 50mg/12.5mg Tablet 1 tab daily     Hydrocodone/Acetaminophen 7.5mg/325mg Tablet 1 po tid PRN pain     Atorvastatin Calcium 10mg Tablet 1 tab daily     Invokana 100mg Tablet 1 tab daily     Topiramate 50mg Capsules, Extended Release Take 1 cap by mouth daily     Albuterol 0.083% Nebulizer Solution 1 vial q 6 hours prn SOA/Wheezing  DX  J20.9     Lyrica 50mg Capsules Take 1 capsule(s) by mouth bid     Incruse Ellipta 62.5mcg/1blister Inhalation Powder Inhale 1 inhalation(s) by mouth daily     Omeprazole 40mg Capsules, Extended Release 1 capsule daily     Clonazepam 0.5mg Tablet 1 bid     Aripiprazole 2mg Tablet 1 tab  am     Buspirone HCl 15mg Tablet 1 tab po q 8 hrs prn anxiety     Hydroxyzine HCl 25mg Tablet 1 tab q8h prn anxiety     diclofenac 75mg 1 BID     Venlafaxine HCl 150mg Capsules, Extended Release 1 tab daily     Venlafaxine HCl 75mg Capsules, Extended Release 1 cap daily     Aspirin (ASA) 81mg Tablets, Enteric Coated 1 tab daily         OBJECTIVE:        Vitals:         Current: 1/25/2019 4:12:47 PM    Ht:  5 ft, 4 in;  Wt: 210.8 lbs;  BMI: 36.2    T: 97.2 F (oral);  BP: 110/66 mm Hg (left arm, sitting);  P: 71 bpm (left arm (BP Cuff), sitting);  sCr: 0.87 mg/dL;  GFR: 82.77        Exams:     PHYSICAL EXAM:     GENERAL: vital signs recorded - well developed, well nourished;  well groomed;  no apparent distress;     EYES: extraocular movements intact; conjunctiva and cornea are normal; PERRLA;     RESPIRATORY: normal respiratory rate and pattern with no distress; normal breath sounds with no rales, rhonchi, wheezes or rubs;     CARDIOVASCULAR: normal rate; rhythm is regular;  no systolic murmur; no edema;     MUSCULOSKELETAL: normal gait; normal overall tone     PSYCHIATRIC:  appropriate affect and demeanor; normal speech pattern; grossly normal memory;         ASSESSMENT           780.4   R42  Dizziness              DDx:     296.20   F32.1  Depression              DDx:     V43.65   Z96.659  Artificial joint replacement, Knee              DDx:     V58.69   Z79.891  Use of high risk medications              DDx:         ORDERS:         Meds Prescribed:       Refill of: Amitriptyline HCl 25mg Tablet 2 tabs PO QHS x 2 weeks, then 1 tab PO QHS x 2 weeks, then stop  #42 (Forty Two) tablet(s) Refills: 0       Refill of:  Hydrocodone/Acetaminophen 7.5mg/325mg Tablet 1 po tid PRN pain  #75 (Seventy Five) tablet(s) Refills: 0                 PLAN:          Dizziness Will wean her down off amitriptyline and see if this is the culprit.  Taper sent as below.  RTC as scheduled next month.         FOLLOW-UP: Keep currently scheduled appointment..            Prescriptions:       Refill of: Amitriptyline HCl 25mg Tablet 2 tabs PO QHS x 2 weeks, then 1 tab PO QHS x 2 weeks, then stop  #42 (Forty Two) tablet(s) Refills: 0          Depression Following with Psych.  Now off Effexor and on Abilify.  Doing better.          Artificial joint replacement, Knee Increased pain complaints.  I will increase her to #75 from #70 tabs per month at least temporarily until she is able to get some surgery on her R arm.  No further increases; she is aware.  Tox screen previously and [facility] were appropriate.  I will see her back next month.           Prescriptions:       Refill of: Hydrocodone/Acetaminophen 7.5mg/325mg Tablet 1 po tid PRN pain  #75 (Seventy Five) tablet(s) Refills: 0          Use of high risk medications     Controlled substance documentation: [facility] reviewed; prior drug screen consistent; consent is reviewed and signed and on the chart.  She is aware of risk of addiction on this medication, understands that she will need to follow up for a review every 3 months and her medications will be adjusted or decreased as deemed appropriate at each visit.  No history of drug or alcohol abuse.  No concerns about diversion or abuse. She denies side effects related to the medication.  She is aware that she may be called in for pill counts.  The dosing of this medication will be reviewed on a regular basis and reduced if possible..  Ongoing use of a controlled substance is necessary for this patient to have a normal quality of life             Patient Recommendations:        For  Dizziness:                     APPOINTMENT INFORMATION:        Monday Tuesday   Wednesday Thursday Friday Saturday Sunday            Time:___________________AM  PM   Date:_____________________             CHARGE CAPTURE           **Please note: ICD descriptions below are intended for billing purposes only and may not represent clinical diagnoses**        Primary Diagnosis:         780.4 Dizziness            R42    Dizziness and giddiness              Orders:          05016   Office/outpatient visit; established patient, level 4  (In-House)           296.20 Depression            F32.1    Major depressive disorder, single episode, moderate    V43.65 Artificial joint replacement, Knee            Z96.659    Presence of unspecified artificial knee joint    V58.69 Use of high risk medications            Z79.891    Long term (current) use of opiate analgesic

## 2021-05-18 NOTE — PROGRESS NOTES
Meliza Arreola. 1960     Office/Outpatient Visit    Visit Date: Mon, Mar 25, 2019 12:57 pm    Provider: Ne Palmer MD (Assistant: Yeimi Murillo MA)    Location: [facility]        Electronically signed by Ne Palmer MD on  03/25/2019 02:56:33 PM                             SUBJECTIVE:        CC:     Ms. Arreola is a 58 year old White female.  She is here today following a transition of care from an inpatient hospital: [facility]. The patient was admitted on 03/14/2019 due to A Fib and discharged 03/15/2019. Our office called the patient within 48 hours of discharge and scheduled the follow-up appointment.. During the patient's hospital stay the patient was treated by Dr. Medina.          HPI: Meliza is here today for HOOD appt following admission to [facility] from 3/14/19 to 3/15/19 for new onset atrial fibrillation with RVR.  While admitted, she was seen by Dr. Chavarria, who recommended follow-up with Cardiology in the out-pt setting.  He started her on Eliquis (CHADS-VASC of 3) but recommended deferring prophylactic rate control agent until after she had had out-pt cardiac monitoring done.  Echo was done while admitted and that was essentially normal, with LVEF 65%, normal ventricular size, normal valvular function.  She is very anxious about her new diagnosis.          She is on hydrocodone/APAP for chronic pain in the knees, which has bothered her beore but even after her knee replacement.  We recently increased her from #70 to #75 tabs monthly and that is keeping her pain under better control.  She knows that is as far as I will increase her before sending her to Pain Management.  She is on Lyrica for diabetic neuropathy as well.  That also seems to be worsening but is under decent control overall.  No adverse effects.     ROS:     CONSTITUTIONAL:  Positive for fatigue.   Negative for fever.      EYES:  Negative for blurred vision.      E/N/T:  Negative for  diminished hearing, nasal congestion and frequent rhinorrhea.      CARDIOVASCULAR:  Negative for chest pain and palpitations.      RESPIRATORY:  Positive for chronic cough and dyspnea ( with moderate exertion; with coughing spells ).   Negative for recent cough or frequent wheezing.      GASTROINTESTINAL:  Negative for abdominal pain, constipation, diarrhea, nausea and vomiting.      MUSCULOSKELETAL:  Positive for arthralgias, (L shoulder) back pain, joint stiffness and limb pain ( left upper extremity pain ).   Negative for myalgias.      PSYCHIATRIC:  Positive for anxiety, depression, feelings of stress, anhedonia, difficulty concentrating and insomnia.          PMH/FMH/SH:     Last Reviewed on 3/25/2019 01:10 PM by Ne Palmer    Past Medical History:                 PAST MEDICAL HISTORY         Hyperlipidemia    Hypertension     COPD    Sleep Apnea     Chronic Pain     Type 2 Diabetes     Depression    Anxiety    PTSD         PREVENTIVE HEALTH MAINTENANCE             COLORECTAL CANCER SCREENING: Up to date (colonoscopy q10y; sigmoidoscopy q5y; Cologuard q3y) was last done 11/21/2014, Results are in chart; colonoscopy with the following abnormalities noted-- hemorrhoids     EYE EXAM: Diabetic Eye Exam during this calendar year and results are in chart was last done 10/29/2018     MAMMOGRAM: Done within last 2 years and results in are chart was last done 2/1/2019 with normal results     PAP SMEAR: was last done 2/19/2019 with normal results NIL (no HPV run d/t Medicare pt)         PAST MEDICAL HISTORY                 ADVANCED DIRECTIVES: None         Surgical History:         Cholecystectomy    Joint Replacement: L knee - 11/2015;     Tubal Ligation      L rotator cuff repair - 1/2018;         Family History:         Positive for Coronary Artery Disease ( father; mother ).      Positive for grandparents.          Social History:     Occupation: [employer]     Marital Status:      Children: 2 children          Tobacco/Alcohol/Supplements:     Last Reviewed on 3/25/2019 01:10 PM by Ne Palmer    Tobacco: She has a past history of cigarette smoking; quit date:  2011.          Substance Abuse History:     Last Reviewed on 3/25/2019 01:10 PM by Ne Palmer        Mental Health History:     Last Reviewed on 3/25/2019 01:10 PM by Ne Palmer        Communicable Diseases (eg STDs):     Last Reviewed on 3/25/2019 01:10 PM by Ne Palmer            Current Problems:     Last Reviewed on 2/18/2019 02:18 PM by Ne Palmer    Atrial fibrillation     Dizziness     Memory loss     Peripheral neuropathy     Actinic keratosis     Syncope     Use of high risk medications     Rosacea     GERD     Squamous cell carcinoma of skin of upper limb, including shoulder     Low back pain     Artificial joint replacement, Knee     Obstructive sleep apnea     Type 2 diabetes     COPD     Hyperlipidemia     Chronic PTSD     Depression     Hypertension     Follow-up examination     Arm pain     Screening for depression     Knee pain         Immunizations:     zzFluzone pf-quadrivalent 3 and up 11/17/2014     zzFluzone pf-quadrivalent 3 and up 11/1/2016         Allergies:     Last Reviewed on 2/18/2019 02:18 PM by Ne Palmer      No Known Drug Allergies.         Current Medications:     Last Reviewed on 2/18/2019 02:18 PM by Ne Palmer    Hydrocodone/Acetaminophen 7.5mg/325mg Tablet 1 po tid PRN pain     Invokana 100mg Tablet 1 tab daily     Breo Ellipta 100mcg/25mcg Inhalation Powder Take 1 inhalation(s) by mouth daily     Losartan/Hydrochlorothiazide 50mg/12.5mg Tablet 1 tab daily     Atorvastatin Calcium 10mg Tablet 1 tab daily     Topiramate 50mg Capsules, Extended Release Take 1 cap by mouth daily     Amitriptyline HCl 75mg Tablet Take 1 tablet(s) by mouth qHS     Incruse Ellipta 62.5mcg/1blister Inhalation Powder Inhale 1 inhalation(s) by mouth daily     Metformin HCl 500mg Tablets, Extended Release 2 po daily  and 1 QHS     Omeprazole 40mg Capsules, Extended Release 1 capsule daily     Lyrica 50mg Capsules Take 1 capsule(s) by mouth bid     Eliquis 5mg Tablet Take 1 tablet(s) by mouth bid     Clonazepam 0.5mg Tablet 1 bid     Aripiprazole 2mg Tablet 1 tab  am     Buspirone HCl 15mg Tablet 1 tab po q 8 hrs prn anxiety     Hydroxyzine HCl 25mg Tablet 1 tab q8h prn anxiety     diclofenac 75mg 1 BID     Aspirin (ASA) 81mg Tablets, Enteric Coated 1 tab daily         OBJECTIVE:        Vitals:         Current: 3/25/2019 1:05:15 PM    Ht:  5 ft, 4 in;  Wt: 199.4 lbs;  BMI: 34.2    T: 97.7 F (oral);  BP: 116/70 mm Hg (left arm, sitting);  P: 82 bpm (left arm (BP Cuff), sitting);  sCr: 0.88 mg/dL;  GFR: 79.92        Exams:     PHYSICAL EXAM:     GENERAL: vital signs recorded - well developed, well nourished;  well groomed;  no apparent distress;     EYES: extraocular movements intact; conjunctiva and cornea are normal; PERRLA;     RESPIRATORY: normal respiratory rate and pattern with no distress; normal breath sounds with no rales, rhonchi, wheezes or rubs;     CARDIOVASCULAR: normal rate; rhythm is irregularly irregular;  no systolic murmur; no edema;     GASTROINTESTINAL: nontender; normal bowel sounds;     MUSCULOSKELETAL: normal gait; normal overall tone     PSYCHIATRIC: affect/demeanor: tearful;  normal psychomotor function; normal speech pattern;         Lab/Test Results:             Amphetamines Screen, Urin:  Negative (03/25/2019),     BAR-Barbiturates Screen, Urin:  Negative (03/25/2019),     Buprenorphine:  Negative (03/25/2019),     BZO-Benzodiazepines Screen,Ur:  Negative (03/25/2019),     Cocaine(Metab.)Screen, Ur:  Negative (03/25/2019),     MDMA-Ecstasy:  Negative (03/25/2019),     Met-Methamphetamine:  Negative (03/25/2019),     MTD-Methadone Screen, Urine:  Negative (03/25/2019),     Opiate Screen, Urine:  Positive (03/25/2019),     OXY-Oxycodone:  Negative (03/25/2019),     PCP-Phencyclidine Screen, Uri:  Negative  (03/25/2019),     THC Cannabinoids Screen, Urin:  Negative (03/25/2019),     Urine temperature:  confirmed (03/25/2019),     Date and time of last pill:  Hydrocodone 03/25/2019 @ 1000, Clonazepam 03/25/2019 @ 1000 (03/25/2019),     Performed by:  tls (03/25/2019),     Collection Time:  1400 (03/25/2019),             ASSESSMENT           427.31   I48.91  Atrial fibrillation              DDx:     719.46   M25.562  Knee pain              DDx:     V58.69   Z79.891  Use of high risk medications              DDx:     309.81   F43.12  Chronic PTSD              DDx:     250.00   E11.42  Type 2 diabetes              DDx:         ORDERS:         Lab Orders:       [id number]  Drug test prsmv qual dir optical obs per day  (In-House)         APPTO  Appointment need  (In-House)         26755  [facility] Comp. Metabolic Panel  (Send-Out)         47565  [facility]  ([facility])                   PLAN:          Atrial fibrillation New diagnosis explained in detail and reassurance provided.  She is on Eliquis currently for anticoagulation.  Not currently on anything for rate control.  She goes to see the cardiologist tomorrow.  Will get records.  Need to check on her K+ level, ordered as below.  RTC 3 months or sooner prn.     LABORATORY:  Labs ordered to be performed today include Comprehensive metabolic panel.      FOLLOW-UP: Schedule a follow-up visit in 3 months..  f/u knee pain with [place]           Orders:       APPTO  Appointment need  (In-House)         [id number]  [facility] Comp. Metabolic Panel  (Send-Out)             Patient Education Handouts:       [facility] Medication Compliance           Knee pain Stable on hydrocodone/APAP.  No refills needed.  No adverse effects.  Pain is fairly well controlled.  She does require ongoing use of this controlled substance to function.  Tox screen and Huber run.  RTC 3 months.          Use of high risk medications     Controlled substance documentation: Huber reviewed; drug screen  performed and appropriate; consent is reviewed and signed and on the chart.  She is aware of risk of addiction on this medication, understands that she will need to follow up for a review every 3 months and her medications will be adjusted or decreased as deemed appropriate at each visit.  No history of drug or alcohol abuse.  No concerns about diversion or abuse. She denies side effects related to the medication.  She is aware that she may be called in for pill counts.  The dosing of this medication will be reviewed on a regular basis and reduced if possible..  Ongoing use of a controlled substance is necessary for this patient to have a normal quality of life           Orders:       [id number]  Drug test prsmv qual dir optical obs per day  (In-House)            Chronic PTSD Discussing with Psych about adjusting meds.          Type 2 diabetes     LABORATORY:  Labs ordered to be performed today include HgbA1C.            Or[facility]:       [id number]  [facility]  ([facility])               Patient Recommendations:        For  Atrial fibrillation:     Schedule a follow-up visit in 3 months.                APPOINTMENT INFORMATION:        Monday Tuesday Wednesday Thursday Friday Saturday Sunday            Time:___________________AM  PM   Date:_____________________             CHARGE CAPTURE           **Please note: ICD descriptions below are intended for billing purposes only and may not represent clinical diagnoses**        Primary Diagnosis:         427.31 Atrial fibrillation            I48.91    Unspecified atrial fibrillation              Orders:          28209   Transitional care manage service 14 day discharge  (In-House)             APPTO   Appointment need  (In-House)           719.46 Knee pain            M25.562    Pain in left knee    V58.69 Use of high risk medications            Z79.891    Long term (current) use of opiate analgesic              Orders:          [id number]   Drug test prsmv qual dir optical  obs per day  (In-House)           309.81 Chronic PTSD            F43.12    Post-traumatic stress disorder, chronic    250.00 Type 2 diabetes            E11.42    Type 2 diabetes mellitus with diabetic polyneuropathy

## 2021-05-18 NOTE — PROGRESS NOTES
Meliza Arreola  1960     Office/Outpatient Visit    Visit Date: Thu, Dec 3, 2020 09:54 am    Provider: Tre Holguin MD (Assistant: Haven Kapadia MA)    Location: [facility]        Electronically signed by Tre Holguin MD on  12/03/2020 04:04:42 PM                             Subjective:        CC: Ms. Arreola is a 60 year old White female.  This is a follow-up visit.          HPI:           Patient presents with type 2 diabetes mellitus with diabetic polyneuropathy.  Specifically, this is type 2, non-insulin requiring diabetes, complicated by peripheral neuropathy.  Compliance with treatment has been good; she takes her medication as directed and follows up as directed.  Primary symptoms reported include peripheral neuropathy.  Current meds include an oral hypoglycemic ( Glucotrol ( 5 mg BID ) and Invokana ( 100mg qd ) ).  Most recent lab results include Hemoglobin A1c:  7.4 (%) (08/11/2020).  Concurrent health problems include hypertension and hypercholesterolemia.  Pertaining neuropathy, she reports that her bilateral lower extremity pain has been flared recently.  She had a nerve conduction study done with neurology that was essentially unremarkable but she was told that she could possibly have small fiber neuropathy.  She is wondering if she can increase her dose of Lyrica.          HLD - Mixed hyperlipidemia details; current treatment includes Lipitor.  Compliance with treatment has been good; she takes her medication as directed and follows up as directed.  She denies experiencing any hypercholesterolemia related symptoms.  Most recent lab tests include Total Cholesterol:  184 (mg/dL) (08/11/2020), HDL:  47 (mg/dL) (08/11/2020), Triglycerides:  194 (mg/dL) (08/11/2020), LDL:  98 (mg/dL) (08/11/2020).  Concurrent health problems include hypertension and diabetes.            Dx with hTN - Essential (primary) hypertension; her current cardiac medication regimen includes a  diuretic ( HCTZ 12.5  mg daily ), a beta-blocker ( Metoprolol 50 mg daily ), a calcium channel blocker ( diltiazem 120 mg daily ), and an angiotensin receptor blocker ( losartan 50 mg daily ).  Compliance with treatment has been good; she takes her medication as directed and follows up as directed.  She is tolerating the medication well without side effects.  Ms. Arreola does not check her blood pressure other than at her clinic appointments.        Patient has a longstanding history of chronic pain problem areas including her knees, shoulders and back.  The current regimen includes hydrocodone 5-325 mg 2-3 times daily.  She is also on Lyrica for diabetic neuropathy as above.      She has previously struggled with with chronic abdominal issues for which she takes omeprazole and sucralfate daily.  She was previously referred to GI and they had planned for an EGD but this was not set up due to the pandemic..  She denies any vomiting.  No changes in bowel habits.      Finally, patient has history of atrial fibrillation for which she follows with cardiology.  Her current regimen includes diltiazem 120 mg daily, metoprolol 50 mg daily, aspirin 81 mg daily, Plavix 75 mg daily and Eliquis 2.5 mg twice daily.  She notes that she has had recent episodes of feeling palpitations.  Last night, she endorsed an episode of dizziness.  Today, her blood pressure is a little low but she says she feels fine.    ROS:     CONSTITUTIONAL:  Positive for fatigue.   Negative for fever.      EYES:  Negative for blurred vision.      E/N/T:  Negative for diminished hearing, nasal congestion and frequent rhinorrhea.      CARDIOVASCULAR:  Positive for dizziness.   Negative for chest pain or palpitations.      RESPIRATORY:  Positive for chronic cough and dyspnea ( with moderate exertion; with coughing spells ).   Negative for recent cough or frequent wheezing.      GASTROINTESTINAL:  Positive for abdominal pain.   Negative for constipation,  diarrhea, nausea or vomiting.      MUSCULOSKELETAL:  Positive for arthralgias, (L shoulder) back pain, joint stiffness and limb pain ( left upper extremity pain ).   Negative for myalgias.      NEUROLOGICAL:  Positive for headaches and paresthesias.   Negative for weakness.      ENDOCRINE:  Negative for hair loss, hot flashes, polydipsia and polyphagia.      PSYCHIATRIC:  Positive for anxiety, depression, feelings of stress, anhedonia, difficulty concentrating and insomnia.          Past Medical History / Family History / Social History:         Last Reviewed on 12/03/2020 04:04 PM by Tre Holguin    Past Medical History:                 PAST MEDICAL HISTORY         Hyperlipidemia    Hypertension     COPD    Sleep Apnea     Chronic Pain     Type 2 Diabetes     Depression    Anxiety    PTSD         PREVENTIVE HEALTH MAINTENANCE             BONE DENSITY: has never been done     COLORECTAL CANCER SCREENING: Up to date (colonoscopy q10y; sigmoidoscopy q5y; Cologuard q3y) was last done 11/21/2014, Results are in chart; colonoscopy with the following abnormalities noted-- hemorrhoids; 11/21/14     EYE EXAM: Diabetic Eye Exam during this calendar year and results are in chart was last done 8/30/2019     MAMMOGRAM: Done within last 2 years and results in are chart was last done 2/28/2020 with normal results     PAP SMEAR: was last done 2/19/2019 with normal results NIL (no HPV run d/t Medicare pt)         PAST MEDICAL HISTORY                 ADVANCED DIRECTIVES: None         PAST MEDICAL HISTORY             CURRENT MEDICAL PROVIDERS:    Cardiologist: NINA BIRD    Ophthalmologist: CELIA    Pulmonologist: NAHED         Surgical History:         Cholecystectomy    Joint Replacement: L knee - 11/2015;     Tubal Ligation     L rotator cuff repair - 1/2018;         Family History:         Positive for Coronary Artery Disease ( father; mother ).      Positive for grandparents.          Social History:     Occupation: [employer]      Marital Status:      Children: 2 children         Tobacco/Alcohol/Supplements:     Last Reviewed on 12/03/2020 04:04 PM by Tre Holguin    Tobacco: She has a past history of cigarette smoking; quit date:  2011.          Substance Abuse History:     Last Reviewed on 12/03/2020 04:04 PM by Tre Holguin        Mental Health History:     Last Reviewed on 12/03/2020 04:04 PM by Tre Holguin        Communicable Diseases (eg STDs):     Last Reviewed on 12/03/2020 04:04 PM by Tre Holguin        Current Problems:     Last Reviewed on 12/03/2020 04:04 PM by Tre Holguin    Type 2 diabetes mellitus with diabetic polyneuropathy    HLD - Mixed hyperlipidemia    Depression - Major depressive disorder, single episode, moderate    PTSD - Post-traumatic stress disorder, chronic    Obstructive sleep apnea (adult) (pediatric)    HTN - Essential (primary) hypertension    COPD - Chronic obstructive pulmonary disease, unspecified    Presence of unspecified artificial knee joint    Pain in left knee    Low back pain    Squamous cell carcinoma of skin of unspecified upper limb, including shoulder    GERD - Gastro-esophageal reflux disease without esophagitis    Rosacea, unspecified    Long term (current) use of opiate analgesic    Actinic keratosis    Other amnesia    Dizziness and giddiness    Unspecified atrial fibrillation    Unspecified chronic gastritis without bleeding    Nausea    Abnormal results of liver function studies    Muscle weakness (generalized)        Immunizations:     Pneumococcal conjugate PCV 13 11/25/2019    zzFluzone pf-quadrivalent 3 and up 11/17/2014    zzFluzone pf-quadrivalent 3 and up 11/1/2016    Fluzone Quadrivalent (3+ years) 11/7/2019        Allergies:     Last Reviewed on 12/03/2020 04:04 PM by Tre Holguin    metoprolol succinate:   (Adverse Reaction)        Current Medications:     Last Reviewed on 12/03/2020 04:04 PM by Tre Holguin    Anoro Ellipta 62.5-25 mcg/actuation Inhalation  Blister, With Inhalation Device [inhale 1 puff by inhalation route once daily at the same time each day]    Ventolin HFA 90 mcg/actuation Inhalation HFA Aerosol Inhaler [inhale 2 puffs q 4hrs prn for wheezing/SOA]    losartan-hydrochlorothiazide 50-12.5 mg oral tablet [1 tab daily]    atorvastatin 10 mg oral tablet [Take 1 tablet by mouth once daily]    HYDROcodone-acetaminophen 7.5-325 mg oral tablet [1 po tid PRN pain]    Invokana 100 mg oral tablet [Take 1 tablet by mouth once daily]    Hydroxyzine HCl 25 mg oral tablet [1 tab q8h prn anxiety]    omeprazole 40 mg oral capsule,delayed release (enteric coated) [Take 1 capsule by mouth once daily]    Diltiazem HCl 120 mg oral Capsule, Extended Release 24 hr [Take 1 capsule(s) by mouth daily]    OneTouch Ultra Blue Test Strip  [USE 1 STRIP TO CHECK GLUCOSE TWICE DAILY]    sucralfate 1 gram oral tablet [TAKE 1 TABLET BY MOUTH TWICE DAILY ON AN EMPTY STOMACH]    promethazine 12.5 mg oral tablet [Take 1 tablet by mouth twice daily as needed]    nebulizers kit  [use Neb q 6 hours PRN]    glipiZIDE 5 mg oral Tablet, Extended Release 24 hr [Take 1 tablet by mouth twice daily]    Vitamin D3 400IU daily     vitamin E 400 unit oral capsule    aspirin 81 mg oral tablet, delayed release (enteric coated) [take 1 tablet (81 mg) by oral route once daily]    Lyrica 150 mg oral capsule [Take 1 capsule twice daily]    METOPROLOL ER 50MG  TAB  [TAKE 1 TABLET BY MOUTH ONCE DAILY]    CLOPIDOGREL  TAB 75MG         Objective:        Vitals:         Current: 12/3/2020 10:02:56 AM    Ht:  5 ft, 4 in;  Wt: 203 lbs;  BMI: 34.8T: 97.2 F (oral);  BP: 89/53 mm Hg (right arm, sitting);  P: 74 bpm (right arm (BP Cuff), sitting);  sCr: 1.06 mg/dL;  GFR: 65.31        Repeat:     10:3:23 AM  BP:   112/75mm Hg (left arm, sitting) 10:45:20 AM  BP:   104/66mm Hg (left arm, sitting) 10:3:31 AM  P:   67bpm (left arm (BP Cuff), sitting)     Exams:     PHYSICAL EXAM:     GENERAL: vital signs recorded - well  developed, well nourished;  well groomed;  no apparent distress;     EYES: lids and lacrimal system are normal in appearance; extraocular movements intact; conjunctiva and cornea are normal; pupils and irises are normal;     E/N/T: OROPHARYNX:  normal mucosa, dentition, gingiva, and posterior pharynx;     RESPIRATORY: normal respiratory rate and pattern with no distress; normal breath sounds with no rales, rhonchi, wheezes or rubs;     CARDIOVASCULAR: normal rate; rhythm is regular;  no systolic murmur; no edema;     GASTROINTESTINAL: mild epigastric and RUQ tenderness;  no guarding;  no rebound tenderness;  normal bowel sounds;     MUSCULOSKELETAL: gait: antalgic;  normal overall tone     NEUROLOGIC: mental status: alert and oriented x 3; GROSSLY INTACT     PSYCHIATRIC: appropriate affect and demeanor; normal psychomotor function; normal speech pattern;         Lab/Test Results:         Hemoglobin A1c: 7.1 (12/03/2020),     Performed by:: mb (12/03/2020),             Assessment:         E11.42   Type 2 diabetes mellitus with diabetic polyneuropathy       E78.2   HLD - Mixed hyperlipidemia       I10   HTN - Essential (primary) hypertension       M54.5   Low back pain       K21.9   GERD - Gastro-esophageal reflux disease without esophagitis       I48.91   Unspecified atrial fibrillation       K29.50   Unspecified chronic gastritis without bleeding           ORDERS:         Lab Orders:       [id number]*  Hgb A1c fast lab  (In-House)              Procedures Ordered:       [id number]  Collection of capillary blood specimen (eg, finger, heel, ear stick)  (In-House)    Right middle finger- mb                  Plan:         Type 2 diabetes mellitus with diabetic polyneuropathy- In-house A1c is near goal today at 7.1%.  Continue current regimen.    LABORATORY:  Labs ordered to be performed today include Hgb A1c inhouse fast lab.            Orders:       [id number]*  Hgb A1c fast lab  (In-House)            [id number]  Collection of capillary  blood specimen (eg, finger, heel, ear stick)  (In-House)    Right middle finger- mb          HLD - Mixed hyperlipidemiaContinue Lipitor 10 mg daily.        HTN - Essential (primary) hypertensionBlood pressure is low/borderline low in office today.  Patient has been advised to check her blood pressure daily and record these readings.  If low readings persist, we will decrease her regimen.        Low back painChronic.  Will increase Lyrica to 150 mg twice daily for uncontrolled peripheral neuropathy.  Otherwise continue current regimen.        GERD - Gastro-esophageal reflux disease without esophagitisWe will refer patient to GI to set up endoscopy.  Continue current regimen.        Unspecified atrial fibrillationStable.  Continue to follow with cardiology.  May adjust blood pressure regimen moving forward as above.  Otherwise, continue current regimen.        Unspecified chronic gastritis without bleeding- see above            Charge Capture:         Primary Diagnosis:     E11.42  Type 2 diabetes mellitus with diabetic polyneuropathy           Orders:      [id number]  Office/outpatient visit; established patient, level 4  (In-House)            [id number]*  Hgb A1c fast lab  (In-House)            [id number]  Collection of capillary blood specimen (eg, finger, heel, ear stick)  (In-House)    Right middle finger- mb          E78.2  HLD - Mixed hyperlipidemia     I10  HTN - Essential (primary) hypertension     M54.5  Low back pain     K21.9  GERD - Gastro-esophageal reflux disease without esophagitis     I48.91  Unspecified atrial fibrillation     K29.50  Unspecified chronic gastritis without bleeding

## 2021-05-18 NOTE — PROGRESS NOTES
Meliza Arreola 1960     Office/Outpatient Visit    Visit Date: Thu, Jun 14, 2018 04:06 pm    Provider: Ne Palmer MD (Assistant: Nieves Santiago MA)    Location: [facility]        Electronically signed by Ne Palmer MD on  06/14/2018 09:17:24 PM                             SUBJECTIVE:        CC: diabetes, chronic pain, COPD         HPI: Meliza is here for routine f/u of chronic issues.        She is on hydrocodone/APAP and diclofenac for knee pain, shoulder pain, and back pain, typically using 2-3 tabs per day of the Norco.  She has gone for PT and epidurals previously without much relief.  She is s/p L TKA.  She follows with Dr. Stout who did rotator cuff repair back in January.  She does her exercises at home consistently.  She is on Lyrica and amitriptyline for diabetic peripheral neuropathy.  The amitriptyline also helps her insomnia.  The Lyrica helps the peripheral neuropathy pain the most.        She is on metformin and Invokana for diabetes.  Last A1c was up at 7.7.  She is on an ACEI and statin.  She is UTD on foot exam (8/2017).  She reports being UTD on eye exam.        She is on losartan/HCTZ for HTN.  BP has been well controlled.  No CP or palpitations.        She is on topiramate, venlafaxine, Abilify, buspirone and hydroxyzine prn for chronic PTSD and follows with Psychiatry.  She feels more depressed here lately.  Her anxiety has been up lately as well.  Her psychiatrist (Dr. Romero) increased her medication again.  The medication does help her sleep.          She is on Breo Ellipta, Incruse Ellipta, and Spiriva for COPD.     ROS:     CONSTITUTIONAL:  Positive for fatigue.   Negative for fever.      EYES:  Negative for blurred vision.      E/N/T:  Negative for diminished hearing, nasal congestion and frequent rhinorrhea.      CARDIOVASCULAR:  Negative for chest pain and palpitations.      RESPIRATORY:  Positive for chronic cough and dyspnea ( with moderate exertion  ).   Negative for recent cough or frequent wheezing.      GASTROINTESTINAL:  Negative for abdominal pain, constipation, diarrhea, nausea and vomiting.      MUSCULOSKELETAL:  Positive for arthralgias, (L shoulder) back pain, joint stiffness and limb pain ( left upper extremity pain ).   Negative for myalgias.      NEUROLOGICAL:  Positive for paresthesia ( bilateral lower extremity ).   Negative for headaches or weakness.      PSYCHIATRIC:  Positive for anxiety, depression, feelings of stress, anhedonia, difficulty concentrating and insomnia.          PMH/FMH/SH:     Last Reviewed on 6/14/2018 04:24 PM by Ne Palmer    Past Medical History:                 PAST MEDICAL HISTORY         Hyperlipidemia    Hypertension     COPD    Sleep Apnea     Chronic Pain     Type 2 Diabetes     Depression    Anxiety    PTSD         PREVENTIVE HEALTH MAINTENANCE             COLORECTAL CANCER SCREENING: Up to date (colonoscopy q10y; sigmoidoscopy q5y; Cologuard q3y) was last done 11/21/2014, Results are in chart; colonoscopy with the following abnormalities noted-- hemorrhoids     MAMMOGRAM: was last done 9/28/16 with normal results     PAP SMEAR: was last done 8/2014 with normal results         PAST MEDICAL HISTORY                 ADVANCED DIRECTIVES: None         Surgical History:         Cholecystectomy    Joint Replacement: L knee - 11/2015;     Tubal Ligation      L rotator cuff repair - 1/2018;         Family History:         Positive for Coronary Artery Disease ( father; mother ).      Positive for grandparents.          Social History:     Occupation: [employer]     Marital Status:      Children: 2 children         Tobacco/Alcohol/Supplements:     Last Reviewed on 6/14/2018 04:24 PM by Ne Palmer    Tobacco: She has a past history of cigarette smoking; quit date:  2011.          Substance Abuse History:     Last Reviewed on 6/14/2018 04:24 PM by Ne Palmer        Mental Health History:     Last Reviewed on  6/14/2018 04:24 PM by Ne Palmer        Communicable Diseases (eg STDs):     Last Reviewed on 6/14/2018 04:24 PM by Ne Palmer            Current Problems:     Last Reviewed on 2/14/2018 11:17 AM by Ne Palmer    Peripheral neuropathy     Actinic keratosis     Syncope     Use of high risk medications     Rosacea     GERD     Squamous cell carcinoma of skin of upper limb, including shoulder     Low back pain     Artificial joint replacement, Knee     Obstructive sleep apnea     Type 2 diabetes     COPD     Hyperlipidemia     Chronic PTSD     Depression     Hypertension     Knee pain         Immunizations:     zzFluzone pf-quadrivalent 3 and up 11/17/2014     zzFluzone pf-quadrivalent 3 and up 11/1/2016         Allergies:     Last Reviewed on 2/14/2018 11:17 AM by Ne Palmer      No Known Drug Allergies.         Current Medications:     Last Reviewed on 2/14/2018 11:17 AM by Ne Palmer    Hydrocodone/Acetaminophen 7.5mg/325mg Tablet 1 po tid PRN pain     Invokana 100mg Tablet 1 tab daily     Atorvastatin Calcium 10mg Tablet 1 tab daily     Losartan/Hydrochlorothiazide 50mg/12.5mg Tablet 1 tab daily     Metformin HCl 500mg Tablets, Extended Release 1 tab bid     Amitriptyline HCl 75mg Tablet Take 1 tablet by mouth qHS     Breo Ellipta 100mcg/25mcg Inhalation Powder Take 1 inhalation(s) by mouth daily     Incruse Ellipta 62.5mcg/1blister Inhalation Powder Inhale 1 inhalation(s) by mouth daily     Ventolin HFA 90mcg/1actuation Oral Inhaler inhale 2 puffs q 4hrs prn for wheezing/SOA     Glucose Reagent Blood Test Strips  Reagent Strips Check BS TID with OneTouch Hellen Dx E11.9     Lancet   Lancet Check BS TID with OneTouch Hellen DX 11.9     Aripiprazole 2mg Tablet 1 tab  am     Buspirone HCl 15mg Tablet 1 tab po q 8 hrs prn anxiety     Hydroxyzine HCl 25mg Tablet 1 tab q8h prn anxiety     diclofenac 75mg 1 BID     Venlafaxine HCl 150mg Capsules, Extended Release 1 tab daily     Venlafaxine HCl 75mg  Capsules, Extended Release 1 cap daily     Aspirin (ASA) 81mg Tablets, Enteric Coated 1 tab daily         OBJECTIVE:        Vitals:         Current: 6/14/2018 4:12:05 PM    Ht:  5 ft, 4 in;  Wt: 218.6 lbs;  BMI: 37.5    T: 97.1 F (oral);  BP: 100/62 mm Hg (right arm, sitting);  P: 86 bpm (right arm (BP Cuff), sitting);  sCr: 0.77 mg/dL;  GFR: 96.07    O2 Sat: 96 % (room air)        Exams:     PHYSICAL EXAM:     GENERAL: vital signs recorded - well developed, well nourished;  well groomed;  no apparent distress;     EYES: extraocular movements intact; conjunctiva and cornea are normal; PERRLA;     E/N/T: OROPHARYNX:  normal mucosa, dentition, gingiva, and posterior pharynx;     RESPIRATORY: normal respiratory rate and pattern with no distress; normal breath sounds with no rales, rhonchi, wheezes or rubs;     CARDIOVASCULAR: normal rate; rhythm is regular;  no edema;     GASTROINTESTINAL: nontender; normal bowel sounds;     MUSCULOSKELETAL: gait: antalgic;  normal overall tone     PSYCHIATRIC: appropriate affect and demeanor; normal psychomotor function; normal speech pattern; normal thought and perception;         Lab/Test Results:             Amphetamines Screen, Urin:  Negative (06/14/2018),     BAR-Barbiturates Screen, Urin:  Negative (06/14/2018),     Buprenorphine:  Negative (06/14/2018),     BZO-Benzodiazepines Screen,Ur:  Negative (06/14/2018),     Cocaine(Metab.)Screen, Ur:  Negative (06/14/2018),     MDMA-Ecstasy:  Negative (06/14/2018),     Met-Methamphetamine:  Negative (06/14/2018),     MTD-Methadone Screen, Urine:  Negative (06/14/2018),     Opiate Screen, Urine:  Negative (06/14/2018),     OXY-Oxycodone:  Negative (06/14/2018),     PCP-Phencyclidine Screen, Uri:  Negative (06/14/2018),     THC Cannabinoids Screen, Urin:  Negative (06/14/2018),     Urine temperature:  confirmed (06/14/2018),     All urine drug screen levels confirmed negative:  yes (06/14/2018),     Date and time of last pill:   hydrocodone 6/13/18 @ 1500 (06/14/2018),     Performed by:  mateusz (06/14/2018),     Collection Time:  1653 (06/14/2018),             ASSESSMENT           719.46   M25.562  Knee pain              DDx:     356.9   E08.42  Peripheral neuropathy              DDx:     V58.69   Z79.891  Use of high risk medications              DDx:     250.00   E11.42  Type 2 diabetes              DDx:     401.1   I10  Hypertension              DDx:     496   J44.9  COPD              DDx:     309.81   F43.12  Chronic PTSD              DDx:         ORDERS:         Meds Prescribed:       Refill of: Metformin HCl 500mg Tablets, Extended Release 2 in am 1 at pm  #270 (Two [place] and [place]) tablet(s) Refills: 1       Refill of: Lyrica (Pregabalin) 50mg Capsules 1 po q hs  #30 (Thirty) capsule(s) Refills: 2         Lab Orders:       36809  Drug test prsmv qual dir optical obs per day  (In-House)         APPTO  Appointment need  (In-House)           Other Orders:         Calculated BMI above the upper parameter and a follow-up plan was documented in the medical record  (In-House)                   PLAN:          Knee pain She is on hydrocodone/APAP for bilateral knee pain as well as other arthritis pain.  She has been very stable on her current dose.  She really cannot move around much without the opiate.  No adverse effects.  She does require ongoing use of this controlled substance to function.  Tox screen and Huber run today.  RTC 3 months.         FOLLOW-UP: Schedule a follow-up visit in 3 months..  knee pain with [place]           Orders:       APPTO  Appointment need  (In-House)             Patient Education Handouts:       [facility] Medication Compliance           Peripheral neuropathy Stable on amitriptyline and Lyrica for peripheral neuropathy.  The Lyrica helps the most.  She needs refills on that today.  No adverse effects.  She does require ongoing use of this controlled substance to function.  Controlled substance monitoring as  above.           Prescriptions:       Refill of: Lyrica (Pregabalin) 50mg Capsules 1 po q hs  #30 (Thirty) capsule(s) Refills: 2          Use of high risk medications     Controlled substance documentation: Huber reviewed; drug screen performed and appropriate; consent is reviewed and signed and on the chart.  She is aware of risk of addiction on this medication, understands that she will need to follow up for a review every 3 months and her medications will be adjusted or decreased as deemed appropriate at each visit.  No history of drug or alcohol abuse.  No concerns about diversion or abuse. She denies side effects related to the medication.  She is aware that she may be called in for pill counts.  The dosing of this medication will be reviewed on a regular basis and reduced if possible..  Ongoing use of a controlled substance is necessary for this patient to have a normal quality of life Drug screen           Orders:       99505  Drug test prsmv qual dir optical obs per day  (In-House)            Type 2 diabetes She is on metformin and Invokana for diabetes.  Last A1c was up at 7.7.  We are going to bump her metformin up to 500 mg ER 2 tabs QAM and 1 tab QPM.  Plan to recheck A1c in 3 months.  She is on an ACEI and statin.  She is UTD on foot exam (8/2017).  She reports being UTD on eye exam.      MIPS     BMI Elevated - Follow-Up Plan: She was provided education on weight loss strategies           Prescriptions:       Refill of: Metformin HCl 500mg Tablets, Extended Release 2 in am 1 at pm  #270 (Two [place] and Seventy) tablet(s) Refills: 1           Orders:         Calculated BMI above the upper parameter and a follow-up plan was documented in the medical record  (In-House)            Hypertension BP at goal.  No refills needed.  Labs UTD.          COPD She is following with Pulmonology.  Has had some dyspnea and wheezing.  She thinks her weight gain has worsened her sx.  Goes back to see the specialist  in July.  No refills needed.          Chronic PTSD Following with Dr. Romero.  He is working on her meds but she is still struggling somewhat.  Encouraged her to be open and discuss her sx with him when she goes.             Patient Recommendations:        For  Knee pain:     Schedule a follow-up visit in 3 months.                APPOINTMENT INFORMATION:        Monday Tuesday Wednesday Thursday Friday Saturday Sunday            Time:___________________AM  PM   Date:_____________________             CHARGE CAPTURE           **Please note: ICD descriptions below are intended for billing purposes only and may not represent clinical diagnoses**        Primary Diagnosis:         719.46 Knee pain            M25.562    Pain in left knee              Orders:          54997   Office/outpatient visit; established patient, level 4  (In-House)             APPTO   Appointment need  (In-House)           356.9 Peripheral neuropathy            E08.42    Diabetes mellitus due to underlying condition with diabetic polyneuropathy    V58.69 Use of high risk medications            Z79.891    Long term (current) use of opiate analgesic              Orders:          89767   Drug test prsmv qual dir optical obs per day  (In-House)           250.00 Type 2 diabetes            E11.42    Type 2 diabetes mellitus with diabetic polyneuropathy              Orders:             Calculated BMI above the upper parameter and a follow-up plan was documented in the medical record  (In-House)           401.1 Hypertension            I10    Essential (primary) hypertension    496 COPD            J44.9    Chronic obstructive pulmonary disease, unspecified    309.81 Chronic PTSD            F43.12    Post-traumatic stress disorder, chronic

## 2021-05-18 NOTE — PROGRESS NOTES
Meliza Arreola 1960     Office/Outpatient Visit    Visit Date: Fri, Dec 28, 2018 10:51 am    Provider: Ne Palmer MD (Assistant: Opal Villegas MA)    Location: [facility]        Electronically signed by Ne Palmer MD on  12/28/2018 12:43:33 PM                             SUBJECTIVE:        CC:     Ms. Arreola is a 58 year old White female.  This is a follow-up visit.  Medicare Wellness;         HPI:     She is UTD on colonoscopy, last done 11/2014 and this showed hemorrhoids.  She is due for pap smear, last done 8/2014 and this was normal.  She is due for mammogram, last done 9/2016 and this was normal.  She is due for Shingrix, Havrix, Td and flu.  She is due for routine labs including in-house A1c.  She is UTD on eye exam (10/2018).  She is UTD on foot exam (9/2018).         She is on hydrocodone/APAP and diclofenac for chronic pain in the knees, shoulders, and back.  She uses 2-3 tabs daily of the Norco.  She has gone for PT and epidurals but these did not help.  She is s/p L TKA and follows with Dr. Stout.  She is on Lyrica and amitriptyline for diabetic peripheral neuropathy.  This helps with her sx for the most part but the pain has been creeping up the leg.        She is going to wean down off the venlafaxine because it was causing dizziness.  She is on clonazepam temporarily in the meantime, but she is going back to him next month to see what the next step is.     Ms. Arreola is here for a Medicare wellness visit.  ADVANCED DIRECTIVES: None     Returning to health checkup, the required HRA questions are integrated within this visit note. Family medical history and individual medical/surgical history were reviewed and updated.  A current height, weight, BMI, blood pressure, and pulse were recorded in the vitals section of the note and have been reviewed. Patient's medications, including supplements, were recorded in the chart and reviewed.  Current providers and  suppliers were reviewed and updated.          Self-Assessment of Health: She rates her health as fair. She rates her confidence of being able to control/manage most of her health problems as very confident. Her physical/emotional health has limited her social activites extremely.  A review of possible cognitive impairment was performed and the following was noted: she is not having trouble driving;  memory changes are noted;  she is having trouble with her finances A review of functional ability, including bathing, dressing, walking, and urine/bowel continence as well as level of safety was performed and was found to be negative.  Falls Risk: Has not had any falls or only one fall without injury in the past year.  In regard to hearing, she reports having trouble hearing the TV/radio when others do not and having to strain to hear or understand conversations, but not wearing hearing aid(s).  Concerning home safety, she reports that at home she DOES have handrails on stairs, functioning smoke alarms and absence of throw rugs, but not adequate lighting or grab bars in the bath.  Physical Activity: She exercises but less than 20 minutes 3 days per week; Walk her dog; Type of diet patient normally eats is described as poor--needs improvement.  Tobacco: She has a past history of cigarette smoking; quit date:  2011.  Preventative Health updated today         PHQ-9 Depression Screening: Completed form scanned and in chart; Total Score 25 Alcohol Consumption Screening: Completed form scanned and in chart; Total Score 1     ROS:     CONSTITUTIONAL:  Positive for fatigue.   Negative for fever.      EYES:  Negative for blurred vision.      E/N/T:  Negative for diminished hearing, nasal congestion and frequent rhinorrhea.      CARDIOVASCULAR:  Negative for chest pain and palpitations.      RESPIRATORY:  Positive for chronic cough and dyspnea ( with moderate exertion; with coughing spells ).   Negative for recent cough or  frequent wheezing.      GASTROINTESTINAL:  Negative for abdominal pain, constipation, diarrhea, nausea and vomiting.      GENITOURINARY:  Negative for dysuria and urinary incontinence.      MUSCULOSKELETAL:  Positive for arthralgias, (L shoulder) back pain, joint stiffness and limb pain ( left upper extremity pain ).   Negative for myalgias.      NEUROLOGICAL:  Positive for memory loss and paresthesia ( bilateral lower extremity ).   Negative for headaches or weakness.      PSYCHIATRIC:  Positive for anxiety, depression, feelings of stress, anhedonia, difficulty concentrating and insomnia.          PMH/FMH/SH:     Last Reviewed on 12/28/2018 11:18 AM by Ne Palmer    Past Medical History:                 PAST MEDICAL HISTORY         Hyperlipidemia    Hypertension     COPD    Sleep Apnea     Chronic Pain     Type 2 Diabetes     Depression    Anxiety    PTSD         PREVENTIVE HEALTH MAINTENANCE             COLORECTAL CANCER SCREENING: Up to date (colonoscopy q10y; sigmoidoscopy q5y; Cologuard q3y) was last done 11/21/2014, Results are in chart; colonoscopy with the following abnormalities noted-- hemorrhoids     EYE EXAM: Diabetic Eye Exam during this calendar year and results are in chart was last done 10/29/2018     MAMMOGRAM: was last done 9/28/16 with normal results     PAP SMEAR: was last done 8/2014 with normal results         PAST MEDICAL HISTORY                 ADVANCED DIRECTIVES: None         Surgical History:         Cholecystectomy    Joint Replacement: L knee - 11/2015;     Tubal Ligation      L rotator cuff repair - 1/2018;         Family History:         Positive for Coronary Artery Disease ( father; mother ).      Positive for grandparents.          Social History:     Occupation: [employer]     Marital Status:      Children: 2 children         Tobacco/Alcohol/Supplements:     Last Reviewed on 12/28/2018 11:18 AM by Ne Palmer    Tobacco: She has a past history of cigarette smoking;  quit date:  2011.          Substance Abuse History:     Last Reviewed on 12/28/2018 11:18 AM by Ne Palmer        Mental Health History:     Last Reviewed on 12/28/2018 11:18 AM by Ne Palmer        Communicable Diseases (eg STDs):     Last Reviewed on 12/28/2018 11:18 AM by Ne Palmer            Current Problems:     Last Reviewed on 11/21/2018 10:58 AM by Ne Palmer    Memory loss     Peripheral neuropathy     Actinic keratosis     Syncope     Use of high risk medications     Rosacea     GERD     Squamous cell carcinoma of skin of upper limb, including shoulder     Low back pain     Artificial joint replacement, Knee     Obstructive sleep apnea     Type 2 diabetes     COPD     Hyperlipidemia     Chronic PTSD     Depression     Hypertension     Diarrhea     Abdominal pain, unspecified     Knee pain         Immunizations:     zzFluzone pf-quadrivalent 3 and up 11/17/2014     zzFluzone pf-quadrivalent 3 and up 11/1/2016         Allergies:     Last Reviewed on 12/28/2018 10:56 AM by Opal Villegas      No Known Drug Allergies.         Current Medications:     Last Reviewed on 12/28/2018 10:59 AM by Opal Villegas    Atorvastatin Calcium 10mg Tablet 1 tab daily     Hydrocodone/Acetaminophen 7.5mg/325mg Tablet 1 po tid PRN pain     Invokana 100mg Tablet 1 tab daily     Amitriptyline HCl 75mg Tablet Take 1 tablet by mouth qHS     Losartan/Hydrochlorothiazide 50mg/12.5mg Tablet 1 tab daily     Breo Ellipta 100mcg/25mcg Inhalation Powder Take 1 inhalation(s) by mouth daily     Topiramate 50mg Capsules, Extended Release Take 1 cap by mouth daily     Metformin HCl 500mg Tablets, Extended Release 2 in am 1 at pm     Incruse Ellipta 62.5mcg/1blister Inhalation Powder Inhale 1 inhalation(s) by mouth daily     Omeprazole 40mg Capsules, Extended Release 1 capsule daily     Lyrica 50mg Capsules Take 1 capsule(s) by mouth bid     Aripiprazole 2mg Tablet 1 tab  am     Buspirone HCl 15mg Tablet 1 tab po q 8  hrs prn anxiety     Hydroxyzine HCl 25mg Tablet 1 tab q8h prn anxiety     diclofenac 75mg 1 BID     Venlafaxine HCl 150mg Capsules, Extended Release 1 tab daily     Venlafaxine HCl 75mg Capsules, Extended Release 1 cap daily     Aspirin (ASA) 81mg Tablets, Enteric Coated 1 tab daily     Clonazepam 0.5mg Tablet 1 bid         OBJECTIVE:        Vitals:         Current: 12/28/2018 11:04:58 AM    Ht:  5 ft, 4 in;  Wt: 213.2 lbs;  BMI: 36.6    T: 98 F (oral);  BP: 119/57 mm Hg (left arm, sitting);  P: 78 bpm (left arm (BP Cuff), sitting);  sCr: 0.87 mg/dL;  GFR: 83.17    VA: 20/30 OD, 20/30 OS (with correction)        Exams:     PHYSICAL EXAM:     GENERAL: vital signs recorded - well developed, well nourished;  well groomed;  no apparent distress;     EYES: extraocular movements intact; conjunctiva and cornea are normal; PERRLA;     E/N/T: OROPHARYNX:  normal mucosa, dentition, gingiva, and posterior pharynx;     RESPIRATORY: normal respiratory rate and pattern with no distress; normal breath sounds with no rales, rhonchi, wheezes or rubs;     CARDIOVASCULAR: normal rate; rhythm is regular;  no edema;     GASTROINTESTINAL: nontender; normal bowel sounds;     MUSCULOSKELETAL: gait: antalgic;  normal overall tone     NEUROLOGIC: mental status: alert and oriented x 3; Reflexes: brachioradialis: 1+; knee jerks: 1+;     PSYCHIATRIC: appropriate affect and demeanor; normal psychomotor function; normal speech pattern; normal thought and perception;         Lab/Test Results:             Hemoglobin A1c:  7.3 (12/28/2018),     Performed by::  [facility] (12/28/2018),     Amphetamines Screen, Urin:  Negative (12/28/2018),     BAR-Barbiturates Screen, Urin:  Negative (12/28/2018),     Buprenorphine:  Negative (12/28/2018),     BZO-Benzodiazepines Screen,Ur:  Negative (12/28/2018),     Cocaine(Metab.)Screen, Ur:  Negative (12/28/2018),     MDMA-Ecstasy:  Negative (12/28/2018),     Met-Methamphetamine:  Negative (12/28/2018),      MTD-Methadone Screen, Urine:  Negative (12/28/2018),     Opiate Screen, Urine:  Positive (12/28/2018),     OXY-Oxycodone:  Negative (12/28/2018),     PCP-Phencyclidine Screen, Uri:  Negative (12/28/2018),     THC Cannabinoids Screen, Urin:  Negative (12/28/2018),     Urine temperature:  confirmed (12/28/2018),     Performed by:  [facility] (12/28/2018),             ASSESSMENT           V70.0   Z00.00  Health checkup              DDx:     V79.0   Z13.89  Screening for depression              DDx:     724.2   M54.5  Low back pain              DDx:     V58.69   Z79.891  Use of high risk medications              DDx:     250.00   E11.42  Type 2 diabetes              DDx:     V76.12   Z12.31  Screening mammogram - other              DDx:         ORDERS:         Meds Prescribed:       Refill of: Hydrocodone/Acetaminophen 7.5mg/325mg Tablet 1 po tid PRN pain  #70 (Seventy) tablet(s) Refills: 0         Radiology/Test Orders:       3017F  Colorectal CA screen results documented and reviewed (PV)  (In-House)         2022F  Dilated retinal eye exam w/interpret by ophthalmologist/optometrist documented/reviewed (DM)4  (In-House)         58757  Screening mammography bi 2-view inc CAD  (Send-Out)           Lab Orders:       [id number]*  Hgb A1c fast lab  (In-House)         60122  Drug test prsmv qual dir optical obs per day  (In-House)         APPTO  Appointment need  (In-House)           Procedures Ordered:         Annual wellness visit, includes a PPPS, subsequent visit  (In-House)           Other Orders:       1101F  Pt screen for fall risk; document no falls in past year or only 1 fall w/o injury in past year (DOUGIE)  (In-House)           Negative EtOH screen  (In-House)           Calculated BMI above the upper parameter and a follow-up plan was documented in the medical record  (In-House)                   PLAN:          Health checkup She is UTD on colonoscopy, last done 11/2014 and this showed hemorrhoids.  She is due for  pap smear, last done 8/2014 and this was normal.  Will get her set up for a WWE.  She is due for mammogram, last done 9/2016 and this was normal; ordered.  She is due for Shingrix, Havrix, Td and flu.  She is due for routine labs including in-house A1c; ordered.  She is UTD on eye exam (10/2018).  She is UTD on foot exam (9/2018).  No fall risk, no memory issues, no signs/symptoms of depression.  She lives with her .  She is able to drive and perform ADLs/manage finances independently.  Hearing is adequate.  She does not have a living will; information given on how to obtain one.  Preventive services handout and safety handout were given to her.  Current doctor list updated.  RTC 3 months.     MIPS PHQ-9 Depression Screening Completed form scanned and in chart; Total Score 25 Negative alcohol screen     COLORECTAL CANCER SCREENING: Results are in chart     BMI Elevated - Follow-Up Plan: She was provided education on weight loss strategies     FOLLOW-UP: Schedule a follow-up visit in 3 months..  f/u chronic pain with [place] AND WWE 30 MIN TO BE DONE AT HER CONVENIENCE           Orders:         Annual wellness visit, includes a PPPS, subsequent visit  (In-House)         1101F  Pt screen for fall risk; document no falls in past year or only 1 fall w/o injury in past year (DOUGIE)  (In-House)           Negative EtOH screen  (In-House)         3017F  Colorectal CA screen results documented and reviewed (PV)  (In-House)           Calculated BMI above the upper parameter and a follow-up plan was documented in the medical record  (In-House)         2022F  Dilated retinal eye exam w/interpret by ophthalmologist/optometrist documented/reviewed (DM)4  (In-House)         APPTO  Appointment need  (In-House)             Patient Education Handouts:       Physical Exam 50-59 year, Female           Low back pain She is complaining of more pain just in general, but specifically in the back and legs.  We discussed  that I am at the upper limits of my comfort as far as prescribing her pain meds goes.  However, she would be a good candidate for Pain [facility] since she is not interested in epidural injections anymore.  She would like to think about this.  In the meantime, will continue her on her Norco and Lyrica at current dose and frequency.  She does require ongoing use of this controlled substance to function.  No adverse effects.  Tox screen and Huber run today.  RTC 3 months.           Prescriptions:       Refill of: Hydrocodone/Acetaminophen 7.5mg/325mg Tablet 1 po tid PRN pain  #70 (Seventy) tablet(s) Refills: 0          Use of high risk medications     Controlled substance documentation: Huber reviewed; drug screen performed and appropriate; consent is reviewed and signed and on the chart.  She is aware of risk of addiction on this medication, understands that she will need to follow up for a review every 3 months and her medications will be adjusted or decreased as deemed appropriate at each visit.  No history of drug or alcohol abuse.  No concerns about diversion or abuse. She denies side effects related to the medication.  She is aware that she may be called in for pill counts.  The dosing of this medication will be reviewed on a regular basis and reduced if possible..  Ongoing use of a controlled substance is necessary for this patient to have a normal quality of life           Orders:       63536  Drug test prsmv qual dir optical obs per day  (In-House)            Type 2 diabetes     LABORATORY:  Labs ordered to be performed today include Hgb A1c inhouse fast lab.            Orders:       [id number]*  Hgb A1c fast lab  (In-House)            Screening mammogram - other         RADIOLOGY:  I have ordered screening mammogram to be done today.            Orders:       [id number]  Screening mammography bi 2-view inc CAD  (Send-Out)               Patient Recommendations:        For  Health checkup:      Schedule a follow-up visit in 3 months.                APPOINTMENT INFORMATION:        Monday Tuesday Wednesday Thursday Friday Saturday Sunday            Time:___________________AM  PM   Date:_____________________             CHARGE CAPTURE           **Please note: ICD descriptions below are intended for billing purposes only and may not represent clinical diagnoses**        Primary Diagnosis:         V70.0 Health checkup            Z00.00    Encounter for general adult medical examination without abnormal findings              Orders:             Annual wellness visit, includes a PPPS, subsequent visit  (In-House)             1101F   Pt screen for fall risk; document no falls in past year or only 1 fall w/o injury in past year (DOUGIE)  (In-House)                Negative EtOH screen  (In-House)             3017F   Colorectal CA screen results documented and reviewed (PV)  (In-House)                Calculated BMI above the upper parameter and a follow-up plan was documented in the medical record  (In-House)             2022F   Dilated retinal eye exam w/interpret by ophthalmologist/optometrist documented/reviewed (DM)4  (In-House)             APPTO   Appointment need  (In-House)           V79.0 Screening for depression            Z13.89    Encounter for screening for other disorder    724.2 Low back pain            M54.5    Low back pain    V58.69 Use of high risk medications            Z79.891    Long term (current) use of opiate analgesic              Orders:          18360   Drug test prsmv qual dir optical obs per day  (In-House)           250.00 Type 2 diabetes            E11.42    Type 2 diabetes mellitus with diabetic polyneuropathy              Orders:          97722*   Hgb A1c fast lab  (In-House)           V76.12 Screening mammogram - other            Z12.31    Encounter for screening mammogram for malignant neoplasm of breast

## 2021-05-19 ENCOUNTER — OFFICE VISIT CONVERTED (OUTPATIENT)
Dept: FAMILY MEDICINE CLINIC | Age: 61
End: 2021-05-19
Attending: FAMILY MEDICINE

## 2021-05-19 ENCOUNTER — HOSPITAL ENCOUNTER (OUTPATIENT)
Dept: OTHER | Facility: HOSPITAL | Age: 61
Discharge: HOME | End: 2021-05-19
Attending: FAMILY MEDICINE

## 2021-05-23 LAB
CODEINE UR QL: <20 NG/ML
CONV 6-MAM (LCMSMS): <10 NG/ML
CONV OPIATES URINE NOROXYMORPHONE: <20 NG/ML
HYDROCODONE UR QL: 1497 NG/ML
HYDROMORPHONE UR QL: <20 NG/ML
MORPHINE UR QL: <20 NG/ML
OPIATES, URINE, NORHYDROCODONE: 937 NG/ML
OPIATES, URINE, NOROXYCODONE: <20 NG/ML
OXYCODONE UR: <20 NG/ML
OXYMORPHONE UR: <20 NG/ML

## 2021-05-28 VITALS
SYSTOLIC BLOOD PRESSURE: 108 MMHG | RESPIRATION RATE: 15 BRPM | WEIGHT: 198 LBS | HEIGHT: 65 IN | BODY MASS INDEX: 32.99 KG/M2 | TEMPERATURE: 97.9 F | OXYGEN SATURATION: 95 % | WEIGHT: 201 LBS | DIASTOLIC BLOOD PRESSURE: 56 MMHG | TEMPERATURE: 97.5 F | HEART RATE: 87 BPM | SYSTOLIC BLOOD PRESSURE: 126 MMHG | HEIGHT: 65 IN | BODY MASS INDEX: 33.49 KG/M2 | OXYGEN SATURATION: 95 % | RESPIRATION RATE: 14 BRPM | HEART RATE: 60 BPM | DIASTOLIC BLOOD PRESSURE: 79 MMHG

## 2021-05-28 VITALS
OXYGEN SATURATION: 100 % | HEIGHT: 65 IN | BODY MASS INDEX: 32.72 KG/M2 | HEART RATE: 68 BPM | RESPIRATION RATE: 14 BRPM | WEIGHT: 196.37 LBS | TEMPERATURE: 98 F | DIASTOLIC BLOOD PRESSURE: 77 MMHG | SYSTOLIC BLOOD PRESSURE: 132 MMHG

## 2021-05-28 VITALS
RESPIRATION RATE: 16 BRPM | WEIGHT: 204 LBS | DIASTOLIC BLOOD PRESSURE: 60 MMHG | HEART RATE: 103 BPM | OXYGEN SATURATION: 95 % | SYSTOLIC BLOOD PRESSURE: 97 MMHG | BODY MASS INDEX: 34.83 KG/M2 | TEMPERATURE: 97.1 F | HEIGHT: 64 IN

## 2021-05-28 NOTE — PROGRESS NOTES
Patient: WESLEY HERNANDEZ     Acct: [id number]     Report: #[id number]  UNIT #: [id number]     : 1960    Encounter Date:2019  PRIMARY CARE: ANGEL MARTÍNEZ  ***Signed***  --------------------------------------------------------------------------------------------------------------------  Chief Complaint      Encounter Date      2019            Primary Care Provider      ANGEL MARTÍNEZ            Referring Provider      ANGEL MARTÍNEZ            Patient Complaint      Patient is complaining of      COPD            VITALS      Height 5 ft 5.00 in / 165.1 cm      Weight 196 lbs 6.000 oz / 89.387515 kg      BSA 1.96 m2      BMI 32.7 kg/m2      Temperature 98.0 F / 36.67 C - Oral      Pulse 68      Respirations 14      Blood Pressure 132/77 Sitting, Right Arm      Pulse Oximetry 100%, roomair      Initial Exhaled Nitrous Oxide      Date:  2019      Exhaled Nitrous Oxide Results:  9            HPI      The patient is a 58 year old  female former cigarette smoker with COPD     here to establish care.  She sees Dr. Kings Martinez for sleep apnea and for     COPD, but now is planning on just doing sleep apnea. She is here to see me to     establish care for COPD. She is on Dulera and Spiriva handihaler.  She has been     on this for about 1-2 years.  She gets short of breath walking about 1000 feet     or up a flight of steps. It is moderate in severity, worse with exertion and     relieved with rest.   She denies any coughing, wheezing, headaches, chest pain     or hemoptysis.  She is asking about combining her medications to where she is     only on one inhaler as opposed to multiple ones.  Denies any nausea, vomiting,     fevers, chills, headaches or chest pain.  She does have occasional allergies     with some itchy-scratchy throat, watery eyes or nasal congestion. She is     eligible for lung cancer screening, quit smoking in 2012 after a 36 pack year     cigarette smoking  history.  She is able to perform her ADLs without difficulty.     Denies any swollen glands or lymph nodes of the head and neck.            I have personally reviewed the review of systems, past family, social, surgical     and medical histories and I agree with the findings.            ROS      Constitutional:  Denies: Fatigue, Fever, Weight gain, Weight loss, Chills,     Insomnia, Other      Respiratory/Breathing:  Complains of: Shortness of air; Denies: Wheezing, Cough,    Hemoptysis, Pleuritic pain, Other      Endocrine:  Denies: Polydipsia, Polyuria, Heat/cold intolerance, Abnorml     menstrual pattern, Diabetes, Other      Eyes:  Denies: Blurred vision, Vision Changes, Other      Ears, nose, mouth, throat:  Denies: Mouth lesions, Thrush, Throat pain,     Hoarseness, Allergies/Hay Fever, Post Nasal Drip, Headaches, Recent Head Injury,    Nose Bleeding, Neck Stiffness, Thyroid Mass, Hearing Loss, Ear Fullness, Dry     Mouth, Nasal or Sinus Pain, Dry Lips, Nasal discharge, Nasal congestion, Other      Cardiovascular:  Denies: Palpitations, Syncope, Claudication, Chest Pain, Wake     up Gasping for air, Leg Swelling, Irregular Heart Rate, Cyanosis, Dyspnea on     Exertion, Other      Gastrointestinal:  Denies: Nausea, Constipation, Diarrhea, Abdominal pain,     Vomiting, Difficulty Swallowing, Reflux/Heartburn, Dysphagia, Jaundice,     Bloating, Melena, Bloody stools, Other      Genitourinary:  Denies: Urinary frequency, Incontinence, Hematuria, Urgency,     Nocturia, Dysuria, Testicular problems, Other      Musculoskeletal:  Denies: Joint Pain, Joint Stiffness, Joint Swelling, Myalgias,    Other      Hematologic/lymphatic:  DENIES: Lymphadenopathy, Bruising, Bleeding tendencies,     Other      Neurological:  Denies: Headache, Numbness, Weakness, Seizures, Other      Psychiatric:  Denies: Anxiety, Appropriate Effect, Depression, Other      Sleep:  No: Excessive daytime sleep, Morning Headache?, Snoring, Insomnia?,  Stop    breathing at sleep?, Other      Integumentary:  Denies: Rash, Dry skin, Skin Warm to Touch, Other      Immunologic/Allergic:  Denies: Latex allergy, Seasonal allergies, Asthma,     Urticaria, Eczema, Other      Immunization status:  No: Up to date            FAMILY/SOCIAL/MEDICAL HX      Surgical History:  Yes: Cholecystectomy, Other Surgeries (pacemaker/ cardiac     ablation)      Stroke - Family Hx:  Brother, Grandparent      Heart - Family Hx:  Mother, Father      Is Father Still Living?:  No      Is Mother Still Living?:  No       Family History:  Yes      Social History:  No Tobacco Use, No Alcohol Use, No Recreational Drug use      Smoking status:  Former smoker (1 ppd x 36 y quit 2011)      Tubal Ligation:  Yes      Anticoagulation Therapy:  No      Antibiotic Prophylaxis:  No      Medical History:  Yes: Atrial Fibrillation, Chronic Bronchitis/COPD, Diabetes      Psychiatric History      NONE            PREVENTION      Hx Influenza Vaccination:  Yes      Date Influenza Vaccine Given:  Nov 1, 2019      2 or More Falls Past Year?:  No      Fall Past Year with Injury?:  No      Hx Pneumococcal Vaccination:  Yes      Encouraged to follow-up with:  PCP regarding preventative exams.      Chart initiated by      RYAN/ MA            ALLERGIES/MEDICATIONS      Allergies:        Coded Allergies:             NO KNOWN ALLERGIES (Unverified , 11/25/19)      Medications    Last Reconciled on 11/25/19 15:05 by GIL DOCKERY MD      Umeclidinium/Vilanterol 62.5-25 Mcg Inh (Anoro Ellipta 62.5-25 Mcg Inh) 1 Each     Blst.w.dev      1 PUFF INH QDAY, #1 INH 3 Refills         Prov: GIL DOCKERY         11/25/19       MDI-Albuterol (Ventolin HFA) 18 Gm Hfa.aer.ad      2 PUFFS INH RTBID, #1 MDI 0 Refills         Reported         11/25/19       Losartan Potassium (Losartan*) 25 Mg Tablet      25 MG PO QDAY, #30 TAB 0 Refills         Reported         11/25/19       Apixaban (Eliquis) 5 Mg Tablet      5 MG PO BID  for 30 Days, #60 TAB         Reported         11/25/19       Atorvastatin (Atorvastatin) 10 Mg Tablet      10 MG PO HS, #30 TAB         Reported         11/25/19       Diltiazem 24HR ER (XR) (Diltiazem 24HR ER (XR)) 120 Mg Cap.er.deg      120 MG PO QDAY, CAP.ER         Reported         11/25/19       Metformin ER* (Glucophage XR*) 500 Mg Tab.er.24h      500 MG PO TID, #60 TAB.ER 0 Refills         Reported         11/25/19       Amiodarone HCl (Pacerone*) 200 Mg Tablet      200 MG PO QDAY, TAB         Reported         11/25/19       Pregabalin (Lyrica*) 150 Mg Cap      150 MG PO QDAY, #60 CAP         Reported         11/25/19       Canagliflozin (INVOKANA) 100 Mg Tab      100 MG PO QDAY, #30 TAB 0 Refills         Reported         11/25/19       Hydrocodone/Acetaminophen 7.5/325 (Hydrocodone/Acetaminophen 7.5/325) 1 Tab     Tablet      1 TAB PO Q3H PRN for PAIN, #30 TAB         Reported         11/25/19       hydrOXYzine HCL (Atarax) 50 Mg Tablet      90 MG PO QID, #120 TAB 0 Refills         Reported         11/25/19       NEB-Albuterol Sulf (Albuterol) 2.5 Mg/3 Ml Vial.neb      2.5 MG INH Q8H PRN for SHORTNESS OF BREATH, #90 NEB 0 Refills         Reported         11/25/19      Current Medications      Current Medications Reviewed 11/25/19            EXAM      Vital Signs Reviewed.      General:  WDWN, Alert, NAD.      HEENT: PERRL, EOMI.  OP, nares clear, no sinus tenderness.      Neck: Supple, no JVD, no thyromegaly.      Lymph: No axillary, cervical, supraclavicular lymphadenopathy noted bilaterally.      Chest: Good aeration, clear to auscultation bilaterally, tympanic to percussion     bilaterally, no work of breathing noted.      CV: RRR, no MGR, pulses 2+, equal.        Abd: Obese, soft, NT, ND, +BS, no HSM.      EXT: No clubbing, no cyanosis, no edema, no joint tenderness.        Neuro:  A  Skin: No rashes or lesions.      Vtials      Vitals:             Height 5 ft 5.00 in / 165.1 cm           Weight 196 lbs  6.000 oz / 89.779812 kg           BSA 1.96 m2           BMI 32.7 kg/m2           Temperature 98.0 F / 36.67 C - Oral           Pulse 68           Respirations 14           Blood Pressure 132/77 Sitting, Right Arm           Pulse Oximetry 100%, roomair            REVIEW      Results Reviewed      PCCS Results Reviewed?:  Yes Prev Lab Results, Yes Prev Radiology Results, Yes     Previous [facility] Records      Lab Results      I reviewed office notes from referring provider. I also personally reviewed     pulmonary function test from 2014.  I also reviewed labs showing no peripheral     eosinophilia and no evidence of chronic hypercapnic respiratory failure.      PFT Results      [id number]                                       [facility] [employer] Management [facility]                            [place]  [id number]               __________________________________________________________________________             Patient Name:                   Attending Physician:      Mayuri Arreola [place] [id number]             Patient Visit # MR #            Admit Date  Disch Date     Location      [id number]    [id number]      04/15/2014                 [facility]             Date of Birth      1960      __________________________________________________________________________      0821 - DIAGNOSTIC REPORT             PULMONARY FUNCTION TESTS:             RESULTS:      FEV1 to FVC ratio actual 79%.      FEV1 is 70 percent of predicted.      FVC is 66 percent of predicted.             There was 15% improvement in FEV1 postbronchodilator therapy.             Static lung volumes are showing total lung capacity of 96% of predicted.      Residual volume to TLC ratio is 161% of predicted, depicting significant      hyperinflation. Diffusion capacity is 89% of predicted.             Flow volume loop is consistent with moderate obstructive airway  disease      pattern.             IMPRESSION:      1. Moderate obstructive airway disease pattern with 15% improvement in FEV1      postbronchodilator therapy.      2. Clinical correlation is required.             The patient may be benefited by the regular use of bronchodilator therapy.      Clinical correlation is suggested.             To be electronically signed in [place]      [id number] FERNANDEZ STEARNS M.D.             AY:perry      D:  04/15/2014 20:11      T:  04/15/2014 22:32      #[id number]             Until signed, this is an unconfirmed preliminary report that may contain      errors and is subject to change.                   14 1805  <Electronically signed by Fernandez Stearns MD>            Assessment      BERG (dyspnea on exertion) - R06.09            COPD (chronic obstructive pulmonary disease)         Centrilobular emphysema - J43.2         COPD type: emphysema         Emphysema type: centrilobular            Obesity (BMI 30.0-34.9) - E66.9            Nicotine dependence in remission         Cigarette nicotine dependence in remission - F17.211         Nicotine product type: cigarettes            Notes      New Medications      * NEB-Albuterol Sulf (Albuterol) 2.5 MG/3 ML VIAL.NEB: 2.5 MG INH Q8H PRN       SHORTNESS OF BREATH #90         Instructions: DIAGNOSIS CODE REQUIRED PRIOR TO PRESCRIBING.      * hydrOXYzine HCL (Atarax) 50 MG TABLET: 90 MG PO QID #120      * Hydrocodone/Acetaminophen 7.5/325 1 TAB TABLET: 1 TAB PO Q3H PRN PAIN #30      * CANAGLIFLOZIN (INVOKANA) 100 MG TAB: 100 MG PO QDAY #30         Instructions: Take before breakfast.      * Pregabalin (Lyrica*) 150 MG CAP: 150 MG PO QDAY #60      * Amiodarone HCl (Pacerone*) 200 MG TABLET: 200 MG PO QDAY      * Metformin ER* (Glucophage XR*) 500 MG TAB.ER.24H: 500 MG PO TID #60      * Diltiazem 24HR ER (XR) 120 MG CAP.ER.DE MG PO QDAY         Instructions: 1 CAP      * Atorvastatin 10 MG TABLET: 10 MG PO HS #30      * Apixaban (Eliquis) 5 MG  TABLET: 5 MG PO BID 30 Days #60      * Losartan Potassium (Losartan*) 25 MG TABLET: 25 MG PO QDAY #30      * MDI-Albuterol (Ventolin HFA) 18 GM HFA.AER.AD: 2 PUFFS INH RTBID #1      * Glycopyrrolate/Formoterol Fum (Bevespi Aerosphere Inhaler) 10.7 GM HFA.AER.AD:      2 PUFFS INH BID #1      * Umeclidinium/Vilanterol 62.5-25 Mcg Inh (Anoro Ellipta 62.5-25 Mcg Inh) 1 EACH      BLST.W.DEV: 1 PUFF INH QDAY #1      New Diagnostics      * PFT-Comp, PrePost,DLCO,BodyBox, Week         Dx: BERG (dyspnea on exertion) - R06.09      * 6 Min Walk w O2 Titration Test, Routine         Dx: BERG (dyspnea on exertion) - R06.09      *  Discuss Need Ldct, Routine         Dx: BERG (dyspnea on exertion) - R06.09      * LDCT for lung ca screening, Routine         Dx: BREG (dyspnea on exertion) - R06.09      * Alpha 1 Antitrypsin , Month         Dx: BERG (dyspnea on exertion) - R06.09      New Office Procedures      * Prevnar-13, As Soon As Possible         Pneumoc 13-Adriana Conj-Dip CRm/Pf (Prevnar 13 Syringe) 0.5 ML SYRINGE: 0.5        MILLILITER INTRAMUSCULARLY Qty 1 SYRINGE         Dx: BERG (dyspnea on exertion) - R06.09      IMPRESSION:      1.  COPD of unclear severity, on triple inhaler therapy, but would like to     consolidate devices.  COPD assessment test score is 8 signifying great control     of underlying disease on current therapies.  Likely can deescalate the inhaled     corticosteroid off her therapy.      2.  Chronic dyspnea.      3.  Obesity with BMI 32.7.      4. Tobacco abuse with cigarettes in remission, eligible for lung cancer     screening.               PLAN:      1.  I performed exhale nitric oxide testing in the office today.  Level is 9     indicative of no eosinophilic airway inflammation.       2.  DC Dulera and DC Spiriva handihaler.  Start Anoro one puff daily.  Inhaler     education provided today.      3. Check full pulmonary function test and six minute walk test.      4. Check alpha 1 antitrypsin level  and genotype.      5. Shared decision making regarding lung cancer screening performed in office     today.  Please see my risks versus benefits part of this note for further     details.  She is amendable to screening and I have placed low dose CT order for     lung cancer screening.      6.  I spent 4 minutes counseling the patient on diet and exercise.  I     recommended 30 minutes of daily exercise and a 1800 calorie a day low fat diet.     The patient verbalized understanding and will make attempts to lose weight.        7.  She is up-to-date with Pneumovax and flu vaccine.  I will give her Prevnar     vaccination today.      8.  Follow up in 3-4 months to reassess her symptoms and discuss her test     results.            Patient Education      ACO BMI High above 25:  Counseling Given, Encouraged weight loss, Encourage     dietary changes      Patient Education Provided:  COPD, How to use an Inhaler            Patient Education:        Chronic Obstructive Pulmonary Disease            LDCT      Assessment      Nicotine dependence, cigarettes, in remission V15.82/F17.211            Plan      LDCT Orders:   to discuss lung ca screen, LDCT for lung ca screeing            Determine need to perform LDCT      Determined ne to perform LDC:  Pt between ages of 55-77 years old., Absence of     sign/symptoms of lung ca      Smoking history:  25-50 pack years            CT History      Pt has not had a reg CT  last 12 mo            Time Spent/Education      Greater than 50% For Edcuation:  Yes      LDCT Patient Education            * Patient was presented with the benefits and harms of screening to include:         The possible need for follow-up diagnostic testing         Over Diagnosis         False Positive Rate         Total Radiation Exposure            * Counseled on the importance of:         Adherence to annual lung cancer LDCT screening         Impact of co-morbidities         The ability or willingness to  undergo diagnosis of treatment               * Counseled on the importance of cigarette cessation.      * Counseled on the importance of maintaining cigarette smoking abstinence.      * Handout provided regarding tobacco cessation interventions.      * Order for lung cancer screening with LDCT was given to the patient.            Electronically signed by GIL DOCKERY  11/27/2019 08:27       Disclaimer: Converted document may not contain table formatting or lab diagrams. Please see [facility] for the authenticated document.

## 2021-05-28 NOTE — PROGRESS NOTES
Patient: WESLEY HERNANDEZ     Acct: [id number]     Report: #[id number]  UNIT #: [id number]     : 1960    Encounter Date:2020  PRIMARY CARE: ANGEL MARTÍNEZ  ***Signed***  --------------------------------------------------------------------------------------------------------------------  Chief Complaint      Encounter Date      Aug 27, 2020            Primary Care Provider      ANGEL MARTÍNEZ            Referring Provider      ANGEL MARTÍNEZ            Patient Complaint      Patient is complaining of      6 month f/u, COPD            VITALS      Height 65 in / 165.1 cm      Weight 201 lbs  / 91.645244 kg      BSA 1.98 m2      BMI 33.4 kg/m2      Temperature 97.5 F / 36.39 C - Temporal      Pulse 87      Respirations 15      Blood Pressure 108/79 Sitting, Right Arm      Pulse Oximetry 95%, room air      Initial Exhaled Nitrous Oxide      Date:  2019      Exhaled Nitrous Oxide Results:  9            HPI      The patient is a 59 year old female, patient of Dr. Mcfarlane who is a former     cigarette smoker with COPD who presents for follow up visit today. The patient     states that since last visit she has been using her Anoro everyday as     prescribed. The patient states that she will get short of air at times that is     worse with exertion, mild to moderate in severity and improved with rest.  The     patient denies any fever, chills, night sweats, hemoptysis, purulent sputum     production, chest pain, chest tightness, swollen glands in the head and neck,     nausea, vomiting or diarrhea.  The patient denies any headaches, myalgias,     changes in sense of taste and/or smell or any other coronavirus or flu-like     symptoms.  The patient states she has not had to take any antibiotics or     steroids for any respiratory issues since last visit. The patient states she did    have a hospitalization last week to have a Watchman's device placed in her     heart. She states that she is seeing  a cardiologist for a history of atrial     fibrillation in [place].  The patient currently denies any leg swelling,     paroxysmal nocturnal dyspnea or orthopnea.  The patient states she is able to     perform ADLs without difficulty.            I have personally reviewed the review of systems, past family, social, surgical     and medical histories and I agree with those as entered in the chart.      Copies To:   GIL DOCKERY      Constitutional:  Denies: Fatigue, Fever, Weight gain, Weight loss, Chills,     Insomnia, Other      Respiratory/Breathing:  Denies: Shortness of air, Wheezing, Cough, Hemoptysis,     Pleuritic pain, Other      Endocrine:  Denies: Polydipsia, Polyuria, Heat/cold intolerance, Abnorml     menstrual pattern, Diabetes, Other      Eyes:  Denies: Blurred vision, Vision Changes, Other      Ears, nose, mouth, throat:  Denies: Congestion, Dysphagia, Hearing Changes, Nose    Bleeding, Nasal Discharge, Throat pain, Tinnitus, Other      Cardiovascular:  Denies: Chest Pain, Exertional dyspnea, Peripheral Edema,     Palpitations, Syncope, Wake up Gasping for air, Orthopnea, Tachycardia, Other      Gastrointestinal:  Denies: Abdominal pain/cramping, Bloody stools, Constipation,    Diarrhea, Melena, Nausea, Vomiting, Other      Genitourinary:  Denies: Dysuria, Urinary frequency, Incontinence, Hematuria,     Urgency, Other      Musculoskeletal:  Denies: Joint Pain, Joint Stiffness, Joint Swelling, Myalgias,    Other      Hematologic/lymphatic:  DENIES: Lymphadenopathy, Bruising, Bleeding tendencies,     Other      Neurologic:  Denies: Headache, Numbness, Weakness, Seizures, Other      Psychiatric:  Denies: Anxiety, Appropriate Effect, Depression, Other      Sleep:  No: Excessive daytime sleep, Morning Headache?, Snoring, Insomnia?, Stop    breathing at sleep?, Other      Integumentary:  Denies: Rash, Dry skin, Skin Warm to Touch, Other            FAMILY/SOCIAL/MEDICAL HX      Surgical  History:  Yes: Cholecystectomy, Other Surgeries (pacemaker/ cardiac     ablation)      Stroke - Family Hx:  Brother, Grandparent      Heart - Family Hx:  Mother, Father      Is Father Still Living?:  No      Is Mother Still Living?:  No       Family History:  Yes      Social History:  No Tobacco Use, No Alcohol Use, No Recreational Drug use      Smoking status:  Former smoker (1 ppd x 36 y quit 2011)      Smoking history:  25-50 pack years      Tubal Ligation:  Yes      Anticoagulation Therapy:  No      Antibiotic Prophylaxis:  No      Medical History:  Yes: Atrial Fibrillation, Chronic Bronchitis/COPD, Diabetes;     No: Sinus Trouble      Psychiatric History      none            PREVENTION      Hx Influenza Vaccination:  Yes      Date Influenza Vaccine Given:  Nov 1, 2019      Influenza Vaccine Declined:  No      2 or More Falls in Past Year?:  No      Fall Past Year with Injury?:  No      Hx Pneumococcal Vaccination:  Yes      Encouraged to follow-up with:  PCP regarding preventative exams.      Chart initiated by      Tonja Concepcion CMA            ALLERGIES/MEDICATIONS      Allergies:        Coded Allergies:             NO KNOWN ALLERGIES (Unverified , 8/27/20)      Medications    Last Reconciled on 8/27/20 14:07 by TATI GARBER,       Umeclidinium/Vilanterol 62.5-25 Mcg Inh (Anoro Ellipta 62.5-25 Mcg Inh) 1 Each     Blst.w.dev      1 PUFF INH QDAY for 30 Days, #1 INH 3 Refills         Prov: [facility]         8/27/20       MDI-Albuterol (Ventolin HFA) 18 Gm Hfa.aer.ad      2 PUFFS INH RTBID, #1 MDI 0 Refills         Prov: TATI GARBER [facility]         8/27/20       Vitamin E (Vitamin E*) 400 Units Capsule      400 UNITS PO QDAY, CAP         Reported         8/27/20       Omeprazole (Omeprazole*) 40 Mg Capsule      40 MG PO HS, #30 CAP 0 Refills         Reported         8/27/20       glipiZIDE (glipiZIDE) 5 Mg Tablet      5 MG PO BID, #60 TAB 0 Refills         Reported         8/27/20       Hctz  (hydroCHLOROthiazide) 12.5 Mg Capsule      12.5 MG PO QDAY, #30 CAP 0 Refills         Reported         2/17/20       Metoprolol Succinate (Metoprolol Succinate) 25 Mg Tab.er.24h      50 MG PO QDAY, #30 TAB 0 Refills         Reported         2/17/20       Umeclidinium/Vilanterol 62.5-25 Mcg Inh (Anoro Ellipta 62.5-25 Mcg Inh) 1 Each     Blst.w.dev               Prov: GIL DOCKERY         1/20/20       Losartan Potassium (Losartan*) 25 Mg Tablet      25 MG PO QDAY, #30 TAB 0 Refills         Reported         11/25/19       Apixaban (Eliquis) 5 Mg Tablet      5 MG PO BID for 30 Days, #60 TAB         Reported         11/25/19       Atorvastatin (Atorvastatin) 10 Mg Tablet      10 MG PO HS, #30 TAB         Reported         11/25/19       dilTIAZem 24Hr ER (dilTIAZem 24Hr ER) 120 Mg Cap.er.deg      120 MG PO QDAY, CAP.ER         Reported         11/25/19       metFORMIN ER (Glucophage XR) 500 Mg Tab.er.24h      500 MG PO TID, #60 TAB.ER 0 Refills         Reported         11/25/19       Pregabalin (Lyrica*) 150 Mg Cap      150 MG PO QDAY, #60 CAP         Reported         11/25/19       Canagliflozin (INVOKANA) 100 Mg Tab      100 MG PO QDAY, #30 TAB 0 Refills         Reported         11/25/19       HYDROcodone-Acetaminophen 7.5-325 Mg (HYDROcodone-Acetaminophen 7.5-325 Mg) 1     Tab Tablet      1 TAB PO Q3H PRN for PAIN, #30 TAB         Reported         11/25/19       hydrOXYzine HCL (Atarax) 50 Mg Tablet      90 MG PO QID, #120 TAB 0 Refills         Reported         11/25/19       NEB-Albuterol Sulf (Albuterol) 2.5 Mg/3 Ml Vial.neb      2.5 MG INH Q8H PRN for SHORTNESS OF BREATH, #90 NEB 0 Refills         Reported         11/25/19      Current Medications      Current Medications Reviewed 8/27/20            EXAM      Vital Signs Reviewed.      General:  Obese, well built, well nourished, in no acute distress.        HEENT: PERRL, EOMI.  OP, nares clear, no sinus tenderness.      Neck: Supple, no JVD, no thyromegaly.       Lymph: No axillary, cervical, supraclavicular lymphadenopathy noted bilaterally.      Chest: Good aeration, clear to auscultation bilaterally, no wheezes, rales or     rhonchi appreciated, normal work of breathing noted.  Patient is able to speak     full sentences without difficulty.        CV: RRR, no MGR, pulses 2+, equal.        Abd: Soft, NT, ND, +BS, no HSM.      EXT: No clubbing, no cyanosis, no edema, no joint tenderness.        Neuro:  A  Skin: No rashes or lesions.      Vitals      Vitals:             Height 65 in / 165.1 cm           Weight 201 lbs  / 91.309271 kg           BSA 1.98 m2           BMI 33.4 kg/m2           Temperature 97.5 F / 36.39 C - Temporal           Pulse 87           Respirations 15           Blood Pressure 108/79 Sitting, Right Arm           Pulse Oximetry 95%, room air            REVIEW      Results Reviewed      PCCS Results Reviewed?:  Yes Prev Lab Results, Yes Prev Radiology Results, Yes     Previous [facility] Records      Lab Results      I reviewed my last office visit note.            Assessment      Notes      New Medications      * glipiZIDE 5 MG TABLET: 5 MG PO BID #60      * Omeprazole (Omeprazole*) 40 MG CAPSULE: 40 MG PO HS #30      * Vitamin E (Vitamin E*) 400 UNITS CAPSULE: 400 UNITS PO QDAY      Renewed Medications      * MDI-Albuterol (Ventolin HFA) 18 GM HFA.AER.AD: 2 PUFFS INH RTBID #1      * Umeclidinium/Vilanterol 62.5-25 Mcg Inh (Anoro Ellipta 62.5-25 Mcg Inh) 1 EACH      BLST.W.DEV: 1 PUFF INH QDAY 30 Days #1      Changed Medications      * Metoprolol Succinate 25 MG TAB.ER.24H: 50 MG PO QDAY #30      IMPRESSION:      1.  COPD with asthma overlap syndrome.      2. Chronic dyspnea, improved per patient report.      3. Tobacco abuse with cigarettes in remission. The patient is already enrolled     in lung cancer screening.      4. Obesity with a BMI of 34.4.               PLAN:      1.  The patient is to continue Anoro everyday as prescribed.      2.  The patient  is to continue albuterol inhaler as needed. The patient is     advised if she has to use her albuterol inhaler three or more times a week to n    otify our office and we will need to step up therapy.        3.  I spent three minutes counseling patient on diet and exercise. Patient's BMI    and weight were discussed.  The patient was counseled on initiating and     intensifying attempts to lose weight.  Patient was counseled about the risks of     obesity and advised to decrease caloric intake by 500 calories a day, eat three     small meals a day and advised to minimize snacking.  I recommended 30-60 minutes    of daily exercise.  The patient refuses referral to dietician at this time.        4. The patient is advised to follow up with cardiologist as scheduled.      5. Patient is advised to call the office, 911 or go to the ER with any new or     worsening symptoms.      6. We will draw an alpha 1 antitrypsin and genotype level in the office today.      7. Patient refused flu and pneumonia vaccines. Risk of refusing vaccines     discussed with patient, patient verbalized understanding.        8.  Follow up in six months with Dr. Mcfarlane, sooner if needed.            Patient Education      ACO BMI High above 25:  Counseling Given, Encouraged weight loss, Encourage     dietary changes      Patient Education Provided:  COPD      Time Spent:  > 50% /Coord Care            Electronically signed by TATI GARBER [facility]  08/28/2020 17:39       Disclaimer: Converted document may not contain table formatting or lab diagrams. Please see [employer] for the authenticated document.

## 2021-05-28 NOTE — PROGRESS NOTES
Patient: WESLEY HERNANDEZ     Acct: [id number]     Report: #[id number]  UNIT #: [id number]     : 1960    Encounter Date:2020  PRIMARY CARE: ANGEL MARTÍNEZ  ***Signed***  --------------------------------------------------------------------------------------------------------------------  Chief Complaint      Encounter Date      2020            Primary Care Provider      ANGEL MARTÍNEZ            Referring Provider      ANGEL MARTÍNEZ            Patient Complaint      Patient is complaining of      Patient here today for f/u, COPD            VITALS      Height 65 in / 165.1 cm      Weight 198 lbs  / 89.756394 kg      BSA 1.97 m2      BMI 32.9 kg/m2      Temperature 97.9 F / 36.61 C - Oral      Pulse 60      Respirations 14      Blood Pressure 126/56 Sitting, Left Arm      Pulse Oximetry 95%, room air      Initial Exhaled Nitrous Oxide      Date:  2019      Exhaled Nitrous Oxide Results:  9            HPI      The patient is a 59 year old female, patient of Dr. Mcfarlane's who is a former     cigarette smoker with COPD who is here for follow up today.  The patient states     that she was started on Anoro inhaler last visit. The patient states that she     does not want to take a daily maintenance inhaler due to feeling like she does     not need it.  The patient denies any increasing shortness of air, coughing or     wheezing.  The patient denies any fever, chills, night sweats, hemoptysis,     purulent sputum production, chest pain, chest tightness, swollen glands in the     head and neck, leg swelling, orthopnea, paroxysmal nocturnal dyspnea, abdominal     pain, nausea, vomiting or diarrhea.  The patient states she is able to walk up a    flight of stairs before having to stop and rest.  The patient states she is able    to walk about 1500 feet before having to stop and rest.  The patient states that    she rarely ever uses her rescue inhaler. She is able to perform her ADLs without     difficulty.  The patient had a pulmonary function test that was performed since     last visit.  PFTs show mild obstruction with mild COPD with asthma overlap. The     patient also had six minute walk test performed since last visit and it came     back normal not showing any desaturations. The patient also had a CT scan     completed since last visit and did not show any abnormalities. The patient     states she has not had to take any antibiotics or steroids since last visit and     denies any hospitalizations since last visit.              I have personally reviewed the review of systems, past family, social, surgical     and medical histories and I agree with the findings.      Copies To:   GIL DOCKERY      Constitutional:  Denies: Fatigue, Fever, Weight gain, Weight loss, Chills,     Insomnia, Other      Respiratory/Breathing:  Denies: Shortness of air, Wheezing, Cough, Hemoptysis,     Pleuritic pain, Other      Endocrine:  Denies: Polydipsia, Polyuria, Heat/cold intolerance, Abnorml     menstrual pattern, Diabetes, Other      Eyes:  Denies: Blurred vision, Vision Changes, Other      Ears, nose, mouth, throat:  Denies: Congestion, Dysphagia, Hearing Changes, Nose    Bleeding, Nasal Discharge, Throat pain, Tinnitus, Other      Cardiovascular:  Denies: Chest Pain, Exertional dyspnea, Peripheral Edema,     Palpitations, Syncope, Wake up Gasping for air, Orthopnea, Tachycardia, Other      Gastrointestinal:  Denies: Abdominal pain/cramping, Bloody stools, Constipation,    Diarrhea, Melena, Nausea, Vomiting, Other      Genitourinary:  Denies: Dysuria, Urinary frequency, Incontinence, Hematuria,     Urgency, Other      Musculoskeletal:  Denies: Joint Pain, Joint Stiffness, Joint Swelling, Myalgias,    Other      Hematologic/lymphatic:  DENIES: Lymphadenopathy, Bruising, Bleeding tendencies,     Other      Neurologic:  Denies: Headache, Numbness, Weakness, Seizures, Other      Psychiatric:  Denies:  Anxiety, Appropriate Effect, Depression, Other      Sleep:  No: Excessive daytime sleep, Morning Headache?, Snoring, Insomnia?, Stop    breathing at sleep?, Other      Integumentary:  Denies: Rash, Dry skin, Skin Warm to Touch, Other            FAMILY/SOCIAL/MEDICAL HX      Surgical History:  Yes: Cholecystectomy, Other Surgeries (pacemaker/ cardiac     ablation)      Stroke - Family Hx:  Brother, Grandparent      Heart - Family Hx:  Mother, Father      Is Father Still Living?:  No      Is Mother Still Living?:  No       Family History:  Yes      Social History:  No Tobacco Use, No Alcohol Use, No Recreational Drug use      Smoking status:  Former smoker (1 ppd x 36 y quit 2011)      Smoking history:  25-50 pack years      Tubal Ligation:  Yes      Anticoagulation Therapy:  No      Antibiotic Prophylaxis:  No      Medical History:  Yes: Atrial Fibrillation, Chronic Bronchitis/COPD, Diabetes;     No: Sinus Trouble      Psychiatric History      none            PREVENTION      Hx Influenza Vaccination:  Yes      Date Influenza Vaccine Given:  Nov 1, 2019      2 or More Falls Past Year?:  No      Fall Past Year with Injury?:  No      Hx Pneumococcal Vaccination:  Yes      Encouraged to follow-up with:  PCP regarding preventative exams.      Chart initiated by      Tonja Concepcion CMA            ALLERGIES/MEDICATIONS      Allergies:        Coded Allergies:             NO KNOWN ALLERGIES (Unverified , 2/17/20)      Medications    Last Reconciled on 2/17/20 13:19 by TATI GARBER       Hctz (hydroCHLOROthiazide) 12.5 Mg Capsule      12.5 MG PO QDAY, #30 CAP 0 Refills         Reported         2/17/20       Metoprolol Succinate (Metoprolol Succinate) 25 Mg Tab.er.24h      25 MG PO QDAY, #30 TAB 0 Refills         Reported         2/17/20       Umeclidinium/Vilanterol 62.5-25 Mcg Inh (Anoro Ellipta 62.5-25 Mcg Inh) 1 Each     Blst.w.dev               Prov: GIL DOCKERY         1/20/20       Umeclidinium/Vilanterol 62.5-25  Mcg Inh (Anoro Ellipta 62.5-25 Mcg Inh) 1 Each     Blst.w.dev      1 PUFF INH QDAY, #1 INH 3 Refills         Prov: GIL DOCKERY         11/25/19       MDI-Albuterol (Ventolin HFA) 18 Gm Hfa.aer.ad      2 PUFFS INH RTBID, #1 MDI 0 Refills         Reported         11/25/19       Losartan Potassium (Losartan*) 25 Mg Tablet      25 MG PO QDAY, #30 TAB 0 Refills         Reported         11/25/19       Apixaban (Eliquis) 5 Mg Tablet      5 MG PO BID for 30 Days, #60 TAB         Reported         11/25/19       Atorvastatin (Atorvastatin) 10 Mg Tablet      10 MG PO HS, #30 TAB         Reported         11/25/19       dilTIAZem 24Hr ER (dilTIAZem 24Hr ER) 120 Mg Cap.er.deg      120 MG PO QDAY, CAP.ER         Reported         11/25/19       metFORMIN ER (Glucophage XR) 500 Mg Tab.er.24h      500 MG PO TID, #60 TAB.ER 0 Refills         Reported         11/25/19       Pregabalin (Lyrica*) 150 Mg Cap      150 MG PO QDAY, #60 CAP         Reported         11/25/19       Canagliflozin (INVOKANA) 100 Mg Tab      100 MG PO QDAY, #30 TAB 0 Refills         Reported         11/25/19       HYDROcodone-Acetaminophen 7.5-325 Mg (HYDROcodone-Acetaminophen 7.5-325 Mg) 1     Tab Tablet      1 TAB PO Q3H PRN for PAIN, #30 TAB         Reported         11/25/19       hydrOXYzine HCL (Atarax) 50 Mg Tablet      90 MG PO QID, #120 TAB 0 Refills         Reported         11/25/19       NEB-Albuterol Sulf (Albuterol) 2.5 Mg/3 Ml Vial.neb      2.5 MG INH Q8H PRN for SHORTNESS OF BREATH, #90 NEB 0 Refills         Reported         11/25/19      Current Medications      Current Medications Reviewed 2/17/20            EXAM      Vital Signs Reviewed.      General:  WDWN, Alert, NAD.      HEENT: PERRL, EOMI.  OP, nares clear, no sinus tenderness.      Neck: Supple, no JVD, no thyromegaly.      Lymph: No axillary, cervical, supraclavicular lymphadenopathy noted bilaterally.      Chest: Good aeration, clear to auscultation bilaterally, no wheezes, rales or      rhonchi appreciated, normal work of breathing noted.  Patient is able to speak     full sentences without difficulty.        CV: RRR, no MGR, pulses 2+, equal.        Abd: Soft, NT, ND, +BS, no HSM.      EXT: No clubbing, no cyanosis, no edema, no joint tenderness.        Neuro:  A  Skin: No rashes or lesions.      Vitals      Vitals:             Height 65 in / 165.1 cm           Weight 198 lbs  / 89.115625 kg           BSA 1.97 m2           BMI 32.9 kg/m2           Temperature 97.9 F / 36.61 C - Oral           Pulse 60           Respirations 14           Blood Pressure 126/56 Sitting, Left Arm           Pulse Oximetry 95%, room air            REVIEW      Results Reviewed      PCCS Results Reviewed?:  Yes Prev Lab Results, Yes Prev Radiology Results, Yes     Previous [facility] Records      Lab Results      I reviewed patient's pulmonary function test that was completed on 01/02/2020.      I also reviewed Dr. Mcfarlane's last office visit and note.      Radiographic Results      [id number]                                       [facility]                          [facility] [employer] [facility]                            [place], [place]  [address]               __________________________________________________________________________             Patient Name:                   Attending Physician:      Mayuri Arreola WARREN [id number]             Patient Visit # MR #            Admit Date  Disch Date     Location      [id number]    [id number]      04/15/2014                 [facility]             Date of Birth      1960      __________________________________________________________________________      0821 - DIAGNOSTIC REPORT             PULMONARY FUNCTION TESTS:             RESULTS:      FEV1 to FVC ratio actual 79%.      FEV1 is 70 percent of predicted.      FVC is 66 percent of predicted.             There was 15% improvement in FEV1 postbronchodilator therapy.              Static lung volumes are showing total lung capacity of 96% of predicted.      Residual volume to TLC ratio is 161% of predicted, depicting significant      hyperinflation. Diffusion capacity is 89% of predicted.             Flow volume loop is consistent with moderate obstructive airway disease      pattern.             IMPRESSION:      1. Moderate obstructive airway disease pattern with 15% improvement in FEV1      postbronchodilator therapy.      2. Clinical correlation is required.             The patient may be benefited by the regular use of bronchodilator therapy.      Clinical correlation is suggested.             To be electronically signed in [employer]      [id number] FERNANDEZ STEARNS M.D.             AY:perry      D:  04/15/2014 20:11      T:  04/15/2014 22:32      #[id number]             Until signed, this is an unconfirmed preliminary report that may contain      errors and is subject to change.                   04/30/14 1805  <Electronically signed by Fernandez Stearns MD>            Assessment      Former smoker - Z87.891            Notes      New Medications      * Metoprolol Succinate 25 MG TAB.ER.24H: 25 MG PO QDAY #30      * HCTZ (hydroCHLOROthiazide) 12.5 MG CAPSULE: 12.5 MG PO QDAY #30      New Diagnostics      * LDCT for lung ca screening, 9 Months         Dx: Former smoker - Z87.891      IMPRESSION:      1.  COPD with asthma overlap syndrome.      2.  Chronic dyspnea, improved per patient report.      3. Tobacco abuse with cigarettes in remission, patient already enrolled in lung     cancer screening.               PLAN:      1.  The patient is on Anoro and is suppose to be taking it everyday, however     patient states she does not take Anoro everyday. The patient states she might     take it once every 3-4 days.  Recommend patient stay on Anoro inhaler one puff     once a day, however patient states she does not want to take a daily inhaler at     this time and would just like to use a rescue  inhaler as needed.      2.  The patient will be due for repeat low dose chest CT scan in 12/2020, order     placed today.      3. Patient's alpha 1 antitrypsin level and genotype completed last visit.  We     will request a copy of results and notify patient of results.      4.  Up-to-date with flu, Prevnar and Pneumovax.      5. Follow up in 6-9 months, sooner if needed.            Patient Education      Patient Education Provided:  COPD      Time Spent:  > 50% /Coord Care            Electronically signed by TATI GARBER [facility]  02/26/2020 10:09       Disclaimer: Converted document may not contain table formatting or lab diagrams. Please see [employer] System for the authenticated document.

## 2021-05-28 NOTE — PROGRESS NOTES
Patient: WESLEY HERNANDEZ     Acct: [id number]     Report: #[id number]  UNIT #: [id number]     : 1960    Encounter Date:03/15/2021  PRIMARY CARE: ANGEL MARTÍNEZ  ***Signed***  --------------------------------------------------------------------------------------------------------------------  Chief Complaint      Encounter Date      Mar 15, 2021            Primary Care Provider      ANGEL MARTÍNEZ            Referring Provider      ANGEL MARTÍNEZ            Patient Complaint      Patient is complaining of      PT here today for F/U, COPD            VITALS      Height 5 ft 4 in / 162.56 cm      Weight 204 lbs  / 92.76439 kg      BSA 1.97 m2      BMI 35.0 kg/m2      Temperature 97.1 F / 36.17 C - Temporal      Pulse 103      Respirations 16      Blood Pressure 97/60 Sitting, Left Arm      Pulse Oximetry 95%, room air      Initial Exhaled Nitrous Oxide      Date:  2019            HPI      The patient is a 60 year old female patient of Dr. Mcfarlane who is a former     cigarette smoker with COPD who presents for follow up visit today. The patient     states since last visit her breathing is at baseline. The patient states she     will get short of breath that is worse with exertion and at times has atrial     fibrillation, moderate in severity and improved with rest.  The patient states     that she does have a pacemaker and that she is under the care of Dr. Gross,     cardiologist for her atrial fibrillation and is scheduled to follow up with him,    however may be following up with him sooner as she feels at times that her     atrial fibrillation goes in and out of a rapid rate.  The patient currently de    nies any wheezing, fever, chills, night sweats, hemoptysis, purulent sputum     production, chest pain, chest tightness, swollen glands in the head and neck,     abdominal pain, nausea, vomiting or diarrhea.   The patient denies any     headaches, myalgias, sore throat, changes in sense of  taste and/or smell or any     other coronavirus or flu-like symptoms.  The patient denies any leg swelling, p    aroxysmal nocturnal dyspnea or orthopnea.  The patient states she is taking     Anoro everyday as prescribed and has an albuterol inhaler she uses as needed.     The patient states she is also on a BiPAP everynight and is under the care of     Dr. Martinez and will be following up with him to discuss possibly having her     settings changed as she states she still feels like she is having apneic ep    isodes at night. The patient denies any excessive daytime sleepiness or morning     headaches.  The patient states she is able to perform ADLs without difficulty.            I have personally reviewed the review of systems, past family, social, surgical     and medical histories and I agree with those as entered in the chart.      Copies To:   GIL DOCKERY      Constitutional:  Denies: Fatigue, Fever, Weight gain, Weight loss, Chills,     Insomnia, Other      Respiratory/Breathing:  Complains of: Shortness of air; Denies: Wheezing, Cough,    Hemoptysis, Pleuritic pain, Other      Endocrine:  Denies: Polydipsia, Polyuria, Heat/cold intolerance, Abnorml     menstrual pattern, Diabetes, Other      Eyes:  Denies: Blurred vision, Vision Changes, Other      Ears, nose, mouth, throat:  Denies: Congestion, Dysphagia, Hearing Changes, Nose    Bleeding, Nasal Discharge, Throat pain, Tinnitus, Other      Cardiovascular:  Denies: Chest Pain, Exertional dyspnea, Peripheral Edema,     Palpitations, Syncope, Wake up Gasping for air, Orthopnea, Tachycardia, Other      Gastrointestinal:  Denies: Abdominal pain/cramping, Bloody stools, Constipation,    Diarrhea, Melena, Nausea, Vomiting, Other      Genitourinary:  Denies: Dysuria, Urinary frequency, Incontinence, Hematuria,     Urgency, Other      Musculoskeletal:  Denies: Joint Pain, Joint Stiffness, Joint Swelling, Myalgias,    Other       Hematologic/lymphatic:  DENIES: Lymphadenopathy, Bruising, Bleeding tendencies,     Other      Neurologic:  Denies: Headache, Numbness, Weakness, Seizures, Other      Psychiatric:  Denies: Anxiety, Appropriate Effect, Depression, Other      Sleep:  No: Excessive daytime sleep, Morning Headache?, Snoring, Insomnia?, Stop    breathing at sleep?, Other      Integumentary:  Denies: Rash, Dry skin, Skin Warm to Touch, Other            FAMILY/SOCIAL/MEDICAL HX      Surgical History:  Yes: Cholecystectomy, Other Surgeries (pacemaker/ cardiac     ablation)      Stroke - Family Hx:  Brother, Grandparent      Heart - Family Hx:  Mother, Father      Smoking status:  Former smoker ((1 ppd x 36 y quit 2011))      Tubal Ligation:  Yes      Anticoagulation Therapy:  Yes      Antibiotic Prophylaxis:  No      Medical History:  Yes: Atrial Fibrillation, Chronic Bronchitis/COPD, Diabetes;     No: Sinus Trouble      Psychiatric History      none            PREVENTION      Hx Influenza Vaccination:  Yes      Date Influenza Vaccine Given:  Nov 1, 2019      Influenza Vaccine Declined:  Yes      2 or More Falls in Past Year?:  No      Fall Past Year with Injury?:  No      Hx Pneumococcal Vaccination:  Yes      Encouraged to follow-up with:  PCP regarding preventative exams.      Chart initiated by      Tonja Concepcion CMA            ALLERGIES/MEDICATIONS      Allergies:        Coded Allergies:             NO KNOWN ALLERGIES (Unverified , 3/15/21)      Medications    Last Reconciled on 3/15/21 14:01 by TATI GARBER,       Cholecalciferol (Vitamin D3) (Vitamin D3) 1,000 Unit Tablet      2000 UNITS PO QDAY for 30 Days, #60 TAB         Reported         3/15/21       Aspirin Chew (Aspirin Baby) 81 Mg Tab.chew      81 MG PO QDAY, #30 TAB.CHEW 0 Refills         Reported         3/15/21       Clopidogrel Bisulfate (Plavix) 75 Mg Tablet      75 MG PO QDAY, #30 TAB 0 Refills         Reported         3/15/21       Umeclidinium/Vilanterol  62.5-25 Mcg Inh (Anoro Ellipta 62.5-25 Mcg Inh) 1 Each     Blst.w.dev      1 PUFF INH QDAY for 30 Days, #1 INH 3 Refills         Prov: TATI GARBER [facility]         2/5/21       MDI-Albuterol (Ventolin HFA) 18 Gm Hfa.aer.ad      2 PUFFS INH RTBID, #1 MDI 0 Refills         Prov: TATI GARBER [facility]         8/27/20       Omeprazole (Omeprazole*) 40 Mg Capsule      40 MG PO HS, #30 CAP 0 Refills         Reported         8/27/20       glipiZIDE (glipiZIDE) 5 Mg Tablet      5 MG PO BID, #60 TAB 0 Refills         Reported         8/27/20       Hctz (hydroCHLOROthiazide) 12.5 Mg Capsule      12.5 MG PO QDAY, #30 CAP 0 Refills         Reported         2/17/20       Metoprolol Succinate (Metoprolol Succinate) 25 Mg Tab.er.24h      50 MG PO QDAY, #30 TAB 0 Refills         Reported         2/17/20       Umeclidinium/Vilanterol 62.5-25 Mcg Inh (Anoro Ellipta 62.5-25 Mcg Inh) 1 Each     Blst.w.dev               Prov: GIL DOCKERY         1/20/20       Losartan Potassium (Losartan*) 25 Mg Tablet      25 MG PO QDAY, #30 TAB 0 Refills         Reported         11/25/19       Atorvastatin (Atorvastatin) 10 Mg Tablet      10 MG PO HS, #30 TAB         Reported         11/25/19       dilTIAZem 24Hr ER (dilTIAZem 24Hr ER) 120 Mg Cap.er.deg      120 MG PO QDAY, CAP.ER         Reported         11/25/19       Pregabalin (Lyrica*) 150 Mg Cap      150 MG PO QDAY, #60 CAP         Reported         11/25/19       Canagliflozin (INVOKANA) 100 Mg Tab      100 MG PO QDAY, #30 TAB 0 Refills         Reported         11/25/19       HYDROcodone-Acetaminophen 7.5-325 Mg (HYDROcodone-Acetaminophen 7.5-325 Mg) 1     Tab Tablet      1 TAB PO Q3H PRN for PAIN, #30 TAB         Reported         11/25/19       hydrOXYzine HCL (Atarax) 50 Mg Tablet      90 MG PO QID, #120 TAB 0 Refills         Reported         11/25/19       NEB-Albuterol Sulf (Albuterol) 2.5 Mg/3 Ml Vial.neb      2.5 MG INH Q8H PRN for SHORTNESS OF BREATH, #90 NEB 0 Refills         Reported          11/25/19      Current Medications      Current Medications Reviewed 3/15/21            EXAM      Vital Signs Reviewed.      General:  WDWN, Alert, NAD.      HEENT: PERRL, EOMI.  OP, nares clear, no sinus tenderness.      Neck: Supple, no JVD, no thyromegaly.      Lymph: No axillary, cervical, supraclavicular lymphadenopathy noted bilaterally.      Chest: Good aeration, clear to auscultation bilaterally, no wheezes, rales or     rhonchi appreciated, normal work of breathing noted, Patient is able to speak     full sentences without difficulty.        CV: Irregularly irregular, no MGR, pulses 2+, equal.  No peripheral edema.        Abd: Soft, NT, ND, +BS, no HSM.      EXT: No clubbing, no cyanosis, no edema, no joint tenderness.        Neuro:  A  Skin: No rashes or lesions.      Vitals      Vitals:             Height 5 ft 4 in / 162.56 cm           Weight 204 lbs  / 92.12362 kg           BSA 1.97 m2           BMI 35.0 kg/m2           Temperature 97.1 F / 36.17 C - Temporal           Pulse 103           Respirations 16           Blood Pressure 97/60 Sitting, Left Arm           Pulse Oximetry 95%, room air            REVIEW      Results Reviewed      [facility] Results Reviewed?:  Yes Prev Lab Results, Yes Prev Radiology Results, Yes     Previous [facility] Records      Lab Results      I reviewed my last office visit note.            Assessment      Notes      New Medications      * Clopidogrel Bisulfate (Plavix) 75 MG TABLET: 75 MG PO QDAY #30      * Aspirin Chew (Aspirin Baby) 81 MG TAB.CHEW: 81 MG PO QDAY #30      * Cholecalciferol (Vitamin D3) (Vitamin D3) 1,000 UNIT TABLET: 2,000 UNITS PO       QDAY 30 Days #60      New Diagnostics      * Low Dose Chest CT, Year         Dx: COPD (chronic obstructive pulmonary disease) - J44.9      ASSESSMENT:       1. COPD with asthma overlap syndrome.      2. Chronic dyspnea.      3. Atrial fibrillation/pacemaker, patient is under the care of cardiologist Dr. Gross.      4.  Tobacco abuse with cigarettes in remission, patient is already enrolled in     lung cancer screening.      5.  Obstructive sleep apnea, patient on BiPAP under the care of Dr. Martinez.      6. Alpha 1 antitrypsin with a normal genotype of MM.              PLAN:      1. The patient to continue Anoro everyday as prescribed.      2. The patient to continue albuterol inhaler as needed.  The patient is advised     if she has to use albuterol inhaler three or more times a week to notify our     office and we will need to step up therapy.      3. The patient is advised to follow up with Dr. Gross, cardiologist as     scheduled.      4. The patient is advised to follow up with Dr. Martinez as scheduled.      5. Patient is advised to call the office, 911 or go to the ER with any new or     worsening symptoms.      6.  The patient refuses all flu and pneumonia vaccines. The risks of refusing     vaccines discussed with patient and patient verbalized understanding. The     patient is advised to receive the COVID19 vaccine when it becomes available and     to continue to follow CDC recommendations of social distancing, wearing a mask     and washing hands for at least 20 seconds.        7.  The patient will be due for a repeat low dose chest CT scan in 08/2021.      8. Follow up with Dr. Mcfarlane in six months, sooner if needed.            Patient Education      Patient Education Provided:  COPD      Time Spent:  > 50% /Coord Care            Electronically signed by TATI GARBER [facility]  03/16/2021 21:30       Disclaimer: Converted document may not contain table formatting or lab diagrams. Please see [employer] for the authenticated document.

## 2021-06-05 NOTE — PROGRESS NOTES
Meliza Arreola  1960     Office/Outpatient Visit    Visit Date: Wed, May 19, 2021 10:40 am    Provider: Ne Palmer MD (Assistant: Arabella Woods MA)    Location: [facility]        Electronically signed by Ne Palmer MD on  05/19/2021 05:42:38 PM                             Subjective:        CC: Mrs. Arreola is a 60 year old White female.  This is a follow-up visit.  physical exam MCW, a fibb, med refills if needed;         HPI:       She is UTD on colonoscopy, last done 11/2014 and this showed hemorrhoids.  Ten year repeat recommended.  She is UTD on pap smear, last done 2/2019 and this showed NIL.  No HPV testing done d/t Medicare, so three year repeat recommended.  She is UTD on mammogram, last done 3/2021.  She is UTD on Pneumovax (11/2019).  She is due for Shingrix and Td.  Flu shot not currently indicated.  She is UTD on routine labs.  She is UTD on eye exam (8/2020) and foot exam (8/2020).        She is on Norco for chronic pain in the knee, shoulder, and back pain, using it 2-3x daily.  She uses #75 per month.  No longer using diclofenac since she had to start Eliquis for atrial fibrillation.  She has previously done PT and epidurals without significant relief.  She continues her home exercise regimen.  S/p L TKA and follows with Dr. Stout for generalized OA.  She is on Lyrica for diabetic peripheral neuropathy.  She is also taking amitriptyline through Psych for PTSD.  The Lyrica helps quite a bit with the diabetic neuropathy.    Mrs. Arreola is here for a Medicare wellness visit.  The required HRA questions are integrated within this visit note. Family medical history and individual medical/surgical history were reviewed and updated.  A current height, weight, BMI, blood pressure, and pulse were recorded in the vitals section of the note and have been reviewed. Patient's medications, including supplements, were recorded in the chart and reviewed.  Current  providers and suppliers were reviewed and updated.          Self-Assessment of Health: She rates her health as fair. She rates her confidence of being able to control/manage most of her health problems as very confident. Her physical/emotional health has limited her social activites quite a bit.  A review of possible cognitive impairment was performed and the following was noted: she is having difficulty driving;  not experiencing changes in memory;  she is not having trouble with her finances A review of functional ability, including bathing, dressing, walking, and urine/bowel continence as well as level of safety was performed and was found to be negative.  Falls Risk: Has not had any falls or only one fall without injury in the past year.  In regard to hearing, she reports having trouble hearing the TV/radio when others do not and having to strain to hear or understand conversations, but not wearing hearing aid(s).  Concerning home safety, she reports that at home she DOES have adequate lighting, handrails on stairs, functioning smoke alarms and absence of throw rugs, but not a skid resistant shower/tub or grab bars in the bath.          Immunization Status: ** Has not received influenza vaccine for this season; Physical Activity: She exercises for at least 20 minutes 3 or more days/week.; Type of diet patient normally eats is described as poor--needs improvement.  Tobacco: She has a past history of cigarette smoking; quit date:  2011.  Preventative Health updated today           PHQ-9 Depression Screening: Completed form scanned and in chart; Total Score 16     ROS:     CONSTITUTIONAL:  Positive for fatigue.   Negative for fever.      EYES:  Negative for blurred vision.      CARDIOVASCULAR:  Positive for dizziness and palpitations ( thinks she is in a-fib now ).   Negative for chest pain.      RESPIRATORY:  Positive for chronic cough and dyspnea ( with moderate exertion; with coughing spells ).   Negative for  recent cough or frequent wheezing.      GASTROINTESTINAL:  Positive for abdominal pain.   Negative for constipation, diarrhea, nausea or vomiting.      GENITOURINARY:  Negative for dysuria and urinary incontinence.      MUSCULOSKELETAL:  Positive for arthralgias, back pain and joint stiffness.   Negative for myalgias.      NEUROLOGICAL:  Positive for headaches and paresthesias.   Negative for weakness.      PSYCHIATRIC:  Positive for anxiety, depression, feelings of stress, anhedonia, difficulty concentrating and insomnia.          Past Medical History / Family History / Social History:         Last Reviewed on 5/19/2021 11:09 AM by Ne Palmer    Past Medical History:                 PAST MEDICAL HISTORY         Hyperlipidemia    Hypertension     COPD    Sleep Apnea     Chronic Pain     Type 2 Diabetes     Depression    Anxiety    PTSD         PREVENTIVE HEALTH MAINTENANCE             BONE DENSITY: has never been done     COLORECTAL CANCER SCREENING: Up to date (colonoscopy q10y; sigmoidoscopy q5y; Cologuard q3y) was last done 11/21/2014, Results are in chart; colonoscopy with the following abnormalities noted-- hemorrhoids; 11/21/14     DENTAL CLEANING: was last done 11/2021     EYE EXAM: Diabetic Eye Exam during this calendar year and results are in chart was last done 8/31/2020     MAMMOGRAM: Done within last 2 years and results in are chart was last done 3/30/2021 with normal results     PAP SMEAR: was last done 2/19/2019 with normal results NIL (no HPV run d/t Medicare pt)         PAST MEDICAL HISTORY                 ADVANCED DIRECTIVES: None         PAST MEDICAL HISTORY             CURRENT MEDICAL PROVIDERS:    Cardiologist: NINA BIRD    Ophthalmologist: CELIA    Pulmonologist: NAHED         Surgical History:         Cholecystectomy    Joint Replacement: L knee - 11/2015;     Tubal Ligation     L rotator cuff repair - 1/2018;         Family History:         Positive for Coronary Artery Disease (  father; mother ).      Positive for grandparents.          Social History:     Occupation: [employer]     Marital Status:      Children: 2 children         Tobacco/Alcohol/Supplements:     Last Reviewed on 5/19/2021 11:09 AM by Ne Palmer    Tobacco: She has a past history of cigarette smoking; quit date:  2011.          Substance Abuse History:     Last Reviewed on 5/19/2021 11:09 AM by Ne Palmer        Mental Health History:     Last Reviewed on 5/19/2021 11:09 AM by Ne Palmer        Communicable Diseases (eg STDs):     Last Reviewed on 5/19/2021 11:09 AM by Ne Palmer        Current Problems:     Last Reviewed on 2/17/2021 02:07 PM by Ne Palmer    Type 2 diabetes mellitus with diabetic polyneuropathy    HLD - Mixed hyperlipidemia    Depression - Major depressive disorder, single episode, moderate    PTSD - Post-traumatic stress disorder, chronic    Obstructive sleep apnea (adult) (pediatric)    HTN - Essential (primary) hypertension    COPD - Chronic obstructive pulmonary disease, unspecified    Presence of unspecified artificial knee joint    Pain in left knee    Low back pain    Squamous cell carcinoma of skin of unspecified upper limb, including shoulder    GERD - Gastro-esophageal reflux disease without esophagitis    Rosacea, unspecified    Long term (current) use of opiate analgesic    Actinic keratosis    Other amnesia    Dizziness and giddiness    Unspecified atrial fibrillation    Unspecified chronic gastritis without bleeding    Nausea    Abnormal results of liver function studies    Muscle weakness (generalized)    Encounter for screening for depression    Methicillin resistant Staphylococcus aureus infection, unspecified site    Chronic pain syndrome    Encounter for other screening for malignant neoplasm of breast        Immunizations:     Pneumococcal conjugate PCV 13 11/25/2019    zzFluzone pf-quadrivalent 3 and up 11/17/2014    zzFluzone pf-quadrivalent 3 and up  11/1/2016    Fluzone Quadrivalent (3+ years) 11/7/2019        Allergies:     Last Reviewed on 5/19/2021 10:48 AM by Arabella Woods    metoprolol succinate:   (Adverse Reaction)        Current Medications:     Last Reviewed on 5/19/2021 10:48 AM by Arabella Woods    Anoro Ellipta 62.5-25 mcg/actuation Inhalation Blister, With Inhalation Device [inhale 1 puff by inhalation route once daily at the same time each day]    Vitamin D3 400IU daily     vitamin E 400 unit oral capsule    aspirin 81 mg oral tablet, delayed release (enteric coated) [take 1 tablet (81 mg) by oral route once daily]    hydrOXYzine HCL 50 mg oral tablet [take 1 tablet (50 mg) by oral route 3 times daily as needed]    albuterol sulfate 2.5 mg /3 mL (0.083 %) Inhalation Solution for Nebulization [prn]    Ventolin HFA 90 mcg/actuation Inhalation HFA Aerosol Inhaler [inhale 2 puffs q 4hrs prn for wheezing/SOA]    losartan-hydrochlorothiazide 50-12.5 mg oral tablet [1 tab daily]    HYDROcodone-acetaminophen 7.5-325 mg oral tablet [1 po tid PRN pain]    atorvastatin 10 mg oral tablet [Take 1 tablet by mouth once daily]    Invokana 100 mg oral tablet [Take 1 tablet by mouth once daily]    pregabalin 150 mg oral capsule [take 1 capsule (150 mg) by oral route 2 times per day]    omeprazole 40 mg oral capsule,delayed release (enteric coated) [Take 1 capsule by mouth once daily]    Diltiazem HCl 120 mg oral Capsule, Extended Release 24 hr [Take 1 capsule(s) by mouth daily]    OneTouch Ultra Blue Test Strip  [USE 1 STRIP TO CHECK GLUCOSE TWICE DAILY]    promethazine 12.5 mg oral tablet [Take 1 tablet by mouth twice daily as needed]    nebulizers kit  [use Neb q 6 hours PRN]    glipiZIDE 5 mg oral Tablet, Extended Release 24 hr [Take 1 tablet by mouth twice daily]    METOPROLOL ER 50MG  TAB  [TAKE 1 TABLET BY MOUTH ONCE DAILY]    CLOPIDOGREL  TAB 75MG     mupirocin 2 % Topical Ointment [apply a small amount to the affected area by topical route 2 times  per day]        Objective:        Vitals:         Current: 5/19/2021 10:43:21 AM    Ht:  5 ft, 4 in;  Wt: 201.8 lbs;  BMI: 34.6T: 97.1 F (temporal);  BP: 87/45 mm Hg (right arm, sitting);  P: 102 bpm (right arm (BP Cuff), sitting);  sCr: 0.8 mg/dL;  GFR: 86.31O2 Sat: 95 % (room air)        Repeat:     11:44:18 AM  BP:   100/84mm Hg (left arm, sitting, P80) 10:55:56 AM  P:   88bpm (finger clip, sitting, pt has pacemaker)     Exams:     PHYSICAL EXAM:     GENERAL: vital signs recorded - well developed, well nourished;  well groomed;  no apparent distress;     EYES: lids and lacrimal system are normal in appearance; extraocular movements intact; conjunctiva and cornea are normal; pupils and irises are normal;     RESPIRATORY: normal respiratory rate and pattern with no distress; normal breath sounds with no rales, rhonchi, wheezes or rubs;     CARDIOVASCULAR: normal rate; rhythm is irregularly irregular;  no systolic murmur; no edema;     GASTROINTESTINAL: nontender; normal bowel sounds;     MUSCULOSKELETAL: gait: antalgic;  normal overall tone     NEUROLOGIC: mental status: alert and oriented x 3; Reflexes: brachioradialis: 2+; knee jerks: 2+;     PSYCHIATRIC: appropriate affect and demeanor; normal psychomotor function; normal speech pattern;         Lab/Test Results:         Urine temperature: confirmed (05/19/2021),     All urine drug screen levels confirmed negative: yes (05/19/2021),     Date and time of last pill: hydrocodone 5/19/21 @0800/sq (05/19/2021),     Performed by: [facility] (05/19/2021),     Collection Time: 1145 (05/19/2021),             Assessment:         Z00.00   Encounter for general adult medical examination without abnormal findings       Z13.31   Encounter for screening for depression       M54.5   Low back pain       Z79.891   Long term (current) use of opiate analgesic           ORDERS:         Radiology/Test Orders:       3017F  Colorectal CA screen results documented and reviewed (PV)  (In-House)             2022F  Dilated retinal eye exam w/interpret by ophthalmologist/optometrist documented/reviewed (DM)4  (In-House)              Lab Orders:       11206  Drug test prsmv qual dir optical obs per day  (In-House)            APPTO  Appointment need  (In-House)            FUTURE  Future order to be done at patients convenience  (Send-Out)            [facility] [address] [facility]  ([facility])              Procedures Ordered:         Annual wellness visit, includes a PPPS, subsequent visit  (In-House)              Other Orders:         Depression screen positive and follow up plan documented  (In-House)            1101F  Pt screen for fall risk; document no falls in past year or only 1 fall w/o injury in past year (DOUGIE)  (In-House)              Screening mammogram results documented  (Send-Out)            1124F  Advance Care Planning discussed and doc in MR; no surrogate named or advance care plan provided  (Send-Out)                      Plan:         Encounter for general adult medical examination without abnormal findingsShe is UTD on colonoscopy, last done 11/2014 and this showed hemorrhoids.  Ten year repeat recommended.  She is UTD on pap smear, last done 2/2019 and this showed NIL.  No HPV testing done d/t Medicare, so three year repeat recommended.  She is UTD on mammogram, last done 3/2021.  She is UTD on Pneumovax (11/2019).  She is due for Shingrix and Td; can be done at the pharmacy.  Flu shot not currently indicated.  She is UTD on routine labs.  She is UTD on eye exam (8/2020) and foot exam (8/2020).  No fall risk, no memory issues, no signs/symptoms of depression.  She lives with her .  She is able to drive and perform ADLs/manage finances independently.  Hearing is adequate.  She does not have a living will.  Preventive services handout and safety handout were given to her.  Current doctor list updated.  RTC 3 months.    [facility] PHQ-9 Depression Screening: Completed form  scanned and in chart; Total Score 16 Positive Depression Screen: follows with Psychiatry; Stable on medications. No suicidal ideation.  ADVANCED DIRECTIVES: None         FOLLOW-UP: Schedule a follow-up visit in 3 months.:.  f/u DM with [facility]          Orders:         Annual wellness visit, includes a PPPS, subsequent visit  (In-House)              Depression screen positive and follow up plan documented  (In-House)            1101F  Pt screen for fall risk; document no falls in past year or only 1 fall w/o injury in past year (DOUGIE)  (In-House)              Screening mammogram results documented  (Send-Out)            3017F  Colorectal CA screen results documented and reviewed (PV)  (In-House)            2022F  Dilated retinal eye exam w/interpret by ophthalmologist/optometrist documented/reviewed (DM)4  (In-House)            1124F  Advance Care Planning discussed and doc in MR; no surrogate named or advance care plan provided  (Send-Out)            APPTO  Appointment need  (In-House)              Low back painStable on current regimen.  Sx well controlled.  No adverse effects.  She does require ongoing use of this controlled substance to function.  Tox screen and Huber run today.  No refills needed.  RTC 3 months.        Long term (current) use of opiate analgesic        FOLLOW-UP TESTING #1: FOLLOW-UP LABORATORY:  Labs to be scheduled in the future include Drug Screen Urine [facility] Confirmation [facility].  Controlled substance documentation: Huber reviewed; prior drug screen consistent; consent is reviewed and signed and on the chart.  She is aware of risk of addiction on this medication, understands that she will need to follow up for a review every 3 months and her medications will be adjusted or decreased as deemed appropriate at each visit.  No history of drug or alcohol abuse.  No concerns about diversion or abuse. She denies side effects related to the medication.  She is aware that she may be  called in for pill counts.  The dosing of this medication will be reviewed on a regular basis and reduced if possible..  Ongoing use of a controlled substance is necessary for this patient to have a normal quality of life           Orders:       95682  Drug test prsmv qual dir optical obs per day  (In-House)            FUTURE  Future order to be done at patients convenience  (Send-Out)            [address]  [facility] [address] [facility]  (Send-Out)                  Patient Recommendations:        For  Encounter for general adult medical examination without abnormal findings:    Schedule a follow-up visit in 3 months.                APPOINTMENT INFORMATION:        Monday Tuesday Wednesday Thursday Friday Saturday Sunday            Time:___________________AM  PM   Date:_____________________         For  Long term (current) use of opiate analgesic:            The following laboratory testing has been ordered:             Charge Capture:         Primary Diagnosis:     Z00.00  Encounter for general adult medical examination without abnormal findings           Orders:        Annual wellness visit, includes a PPPS, subsequent visit  (In-House)              Depression screen positive and follow up plan documented  (In-House)            1101F  Pt screen for fall risk; document no falls in past year or only 1 fall w/o injury in past year (DOUGIE)  (In-House)            3017F  Colorectal CA screen results documented and reviewed (PV)  (In-House)            2022F  Dilated retinal eye exam w/interpret by ophthalmologist/optometrist documented/reviewed (DM)4  (In-House)            APPTO  Appointment need  (In-House)              Z13.31  Encounter for screening for depression     M54.5  Low back pain     Z79.891  Long term (current) use of opiate analgesic           Orders:      50759  Drug test prsmv qual dir optical obs per day  (In-House)

## 2021-06-08 DIAGNOSIS — E11.42 DIABETIC PERIPHERAL NEUROPATHY (HCC): Primary | ICD-10-CM

## 2021-06-08 RX ORDER — GLIPIZIDE 5 MG/1
TABLET, FILM COATED, EXTENDED RELEASE ORAL
Qty: 180 TABLET | Refills: 1 | Status: SHIPPED | OUTPATIENT
Start: 2021-06-08 | End: 2021-12-08

## 2021-06-08 RX ORDER — PREGABALIN 150 MG/1
CAPSULE ORAL
Qty: 60 CAPSULE | Refills: 2 | Status: SHIPPED | OUTPATIENT
Start: 2021-06-08 | End: 2021-09-13

## 2021-06-11 ENCOUNTER — TELEPHONE (OUTPATIENT)
Dept: FAMILY MEDICINE CLINIC | Age: 61
End: 2021-06-11

## 2021-06-11 DIAGNOSIS — M54.42 CHRONIC BILATERAL LOW BACK PAIN WITH BILATERAL SCIATICA: Primary | ICD-10-CM

## 2021-06-11 DIAGNOSIS — M54.41 CHRONIC BILATERAL LOW BACK PAIN WITH BILATERAL SCIATICA: Primary | ICD-10-CM

## 2021-06-11 DIAGNOSIS — G89.29 CHRONIC BILATERAL LOW BACK PAIN WITH BILATERAL SCIATICA: Primary | ICD-10-CM

## 2021-06-11 PROBLEM — E78.5 HYPERLIPIDEMIA: Status: ACTIVE | Noted: 2021-06-11

## 2021-06-11 PROBLEM — E11.9 DIABETES: Status: ACTIVE | Noted: 2021-06-11

## 2021-06-11 PROBLEM — I10 HYPERTENSION: Status: ACTIVE | Noted: 2021-06-11

## 2021-06-11 PROBLEM — K21.9 GASTROESOPHAGEAL REFLUX DISEASE WITHOUT ESOPHAGITIS: Status: ACTIVE | Noted: 2021-06-11

## 2021-06-11 RX ORDER — AMIODARONE HYDROCHLORIDE 200 MG/1
200 TABLET ORAL 2 TIMES DAILY
COMMUNITY
Start: 2021-05-28 | End: 2023-01-11

## 2021-06-11 RX ORDER — CANAGLIFLOZIN 100 MG/1
1 TABLET, FILM COATED ORAL DAILY
COMMUNITY
Start: 2021-06-03 | End: 2021-09-02

## 2021-06-11 RX ORDER — METOPROLOL SUCCINATE 50 MG/1
50 TABLET, EXTENDED RELEASE ORAL DAILY
COMMUNITY
Start: 2021-03-31

## 2021-06-11 RX ORDER — ALBUTEROL SULFATE 90 UG/1
AEROSOL, METERED RESPIRATORY (INHALATION)
COMMUNITY
End: 2021-09-27

## 2021-06-11 RX ORDER — BLOOD SUGAR DIAGNOSTIC
1 STRIP MISCELLANEOUS 2 TIMES DAILY
COMMUNITY
Start: 2021-05-17 | End: 2021-07-08

## 2021-06-11 RX ORDER — DILTIAZEM HYDROCHLORIDE 120 MG/1
120 CAPSULE, EXTENDED RELEASE ORAL DAILY
COMMUNITY
Start: 2021-05-10

## 2021-06-11 RX ORDER — HYDROCODONE BITARTRATE AND ACETAMINOPHEN 7.5; 325 MG/1; MG/1
1 TABLET ORAL 3 TIMES DAILY PRN
COMMUNITY
Start: 2021-05-14 | End: 2021-06-11 | Stop reason: SDUPTHER

## 2021-06-11 RX ORDER — CLOPIDOGREL BISULFATE 75 MG/1
75 TABLET ORAL DAILY
COMMUNITY
Start: 2021-04-16

## 2021-06-11 RX ORDER — HYDROCHLOROTHIAZIDE 12.5 MG/1
CAPSULE, GELATIN COATED ORAL
COMMUNITY

## 2021-06-11 RX ORDER — HYDROXYZINE 50 MG/1
TABLET, FILM COATED ORAL
COMMUNITY
End: 2022-02-23 | Stop reason: SDUPTHER

## 2021-06-11 RX ORDER — LOSARTAN POTASSIUM 25 MG/1
25 TABLET ORAL DAILY
COMMUNITY
Start: 2021-03-08

## 2021-06-11 RX ORDER — APIXABAN 5 MG/1
5 TABLET, FILM COATED ORAL 2 TIMES DAILY
COMMUNITY
Start: 2021-05-28 | End: 2021-08-19

## 2021-06-11 RX ORDER — ATORVASTATIN CALCIUM 10 MG/1
10 TABLET, FILM COATED ORAL DAILY
COMMUNITY
Start: 2021-04-19 | End: 2021-10-20

## 2021-06-11 RX ORDER — AMIODARONE HYDROCHLORIDE 100 MG/1
TABLET ORAL
COMMUNITY
End: 2021-12-02 | Stop reason: SDUPTHER

## 2021-06-11 RX ORDER — HYDROCODONE BITARTRATE AND ACETAMINOPHEN 7.5; 325 MG/1; MG/1
1 TABLET ORAL 3 TIMES DAILY PRN
Qty: 75 TABLET | Refills: 0 | Status: SHIPPED | OUTPATIENT
Start: 2021-06-11 | End: 2021-07-09 | Stop reason: SDUPTHER

## 2021-06-11 RX ORDER — OMEPRAZOLE 40 MG/1
40 CAPSULE, DELAYED RELEASE ORAL DAILY
COMMUNITY
Start: 2021-03-15 | End: 2021-06-22

## 2021-06-11 NOTE — TELEPHONE ENCOUNTER
Caller: Meliza Hernandez    Relationship: Self    Best call back number: [phone]     What is the best time to reach you: ANYTIME     Who are you requesting to speak with (clinical staff, provider,  specific staff member): CLINICAL STAFF     Do you know the name of the person who called: MELIZA HERNANDEZ     What was the call regarding: SHE IS WANTING TO KNOW IF THE NURSE COULD CALL.    Do you require a callback: YES

## 2021-06-22 RX ORDER — OMEPRAZOLE 40 MG/1
CAPSULE, DELAYED RELEASE ORAL
Qty: 90 CAPSULE | Refills: 1 | Status: SHIPPED | OUTPATIENT
Start: 2021-06-22 | End: 2021-12-08

## 2021-07-01 VITALS
HEART RATE: 64 BPM | DIASTOLIC BLOOD PRESSURE: 45 MMHG | BODY MASS INDEX: 33.8 KG/M2 | TEMPERATURE: 98 F | WEIGHT: 198 LBS | HEIGHT: 64 IN | SYSTOLIC BLOOD PRESSURE: 115 MMHG

## 2021-07-01 VITALS
BODY MASS INDEX: 37.47 KG/M2 | TEMPERATURE: 97.1 F | WEIGHT: 219.5 LBS | HEART RATE: 72 BPM | DIASTOLIC BLOOD PRESSURE: 60 MMHG | HEIGHT: 64 IN | SYSTOLIC BLOOD PRESSURE: 91 MMHG

## 2021-07-01 VITALS
HEART RATE: 60 BPM | TEMPERATURE: 97.8 F | BODY MASS INDEX: 34.28 KG/M2 | HEIGHT: 64 IN | SYSTOLIC BLOOD PRESSURE: 139 MMHG | OXYGEN SATURATION: 96 % | DIASTOLIC BLOOD PRESSURE: 57 MMHG | WEIGHT: 200.8 LBS

## 2021-07-01 VITALS
BODY MASS INDEX: 36.4 KG/M2 | TEMPERATURE: 98 F | HEART RATE: 78 BPM | SYSTOLIC BLOOD PRESSURE: 119 MMHG | DIASTOLIC BLOOD PRESSURE: 57 MMHG | HEIGHT: 64 IN | WEIGHT: 213.2 LBS

## 2021-07-01 VITALS
TEMPERATURE: 97.2 F | HEART RATE: 71 BPM | DIASTOLIC BLOOD PRESSURE: 66 MMHG | SYSTOLIC BLOOD PRESSURE: 110 MMHG | BODY MASS INDEX: 35.99 KG/M2 | HEIGHT: 64 IN | WEIGHT: 210.8 LBS

## 2021-07-01 VITALS
SYSTOLIC BLOOD PRESSURE: 120 MMHG | HEART RATE: 77 BPM | DIASTOLIC BLOOD PRESSURE: 62 MMHG | BODY MASS INDEX: 36.6 KG/M2 | TEMPERATURE: 97.9 F | WEIGHT: 214.4 LBS | HEIGHT: 64 IN

## 2021-07-01 VITALS
SYSTOLIC BLOOD PRESSURE: 100 MMHG | OXYGEN SATURATION: 96 % | BODY MASS INDEX: 37.32 KG/M2 | DIASTOLIC BLOOD PRESSURE: 62 MMHG | TEMPERATURE: 97.1 F | WEIGHT: 218.6 LBS | HEIGHT: 64 IN | HEART RATE: 86 BPM

## 2021-07-01 VITALS
WEIGHT: 199.4 LBS | DIASTOLIC BLOOD PRESSURE: 70 MMHG | TEMPERATURE: 97.7 F | SYSTOLIC BLOOD PRESSURE: 116 MMHG | HEIGHT: 64 IN | HEART RATE: 82 BPM | BODY MASS INDEX: 34.04 KG/M2

## 2021-07-01 VITALS
SYSTOLIC BLOOD PRESSURE: 107 MMHG | WEIGHT: 205.2 LBS | HEART RATE: 70 BPM | BODY MASS INDEX: 35.03 KG/M2 | DIASTOLIC BLOOD PRESSURE: 62 MMHG | TEMPERATURE: 97.6 F | HEIGHT: 64 IN

## 2021-07-01 VITALS
BODY MASS INDEX: 33.94 KG/M2 | DIASTOLIC BLOOD PRESSURE: 63 MMHG | HEIGHT: 64 IN | HEART RATE: 61 BPM | WEIGHT: 198.8 LBS | SYSTOLIC BLOOD PRESSURE: 105 MMHG | OXYGEN SATURATION: 94 % | TEMPERATURE: 98.2 F

## 2021-07-01 VITALS
TEMPERATURE: 98.2 F | DIASTOLIC BLOOD PRESSURE: 56 MMHG | BODY MASS INDEX: 37.28 KG/M2 | HEIGHT: 64 IN | WEIGHT: 218.4 LBS | SYSTOLIC BLOOD PRESSURE: 132 MMHG | HEART RATE: 70 BPM

## 2021-07-01 VITALS
SYSTOLIC BLOOD PRESSURE: 107 MMHG | DIASTOLIC BLOOD PRESSURE: 56 MMHG | HEART RATE: 51 BPM | BODY MASS INDEX: 34.01 KG/M2 | TEMPERATURE: 97.3 F | WEIGHT: 199.2 LBS | HEIGHT: 64 IN

## 2021-07-02 VITALS
WEIGHT: 201.8 LBS | HEART RATE: 88 BPM | SYSTOLIC BLOOD PRESSURE: 100 MMHG | TEMPERATURE: 97.1 F | HEIGHT: 64 IN | DIASTOLIC BLOOD PRESSURE: 84 MMHG | OXYGEN SATURATION: 95 % | BODY MASS INDEX: 34.45 KG/M2

## 2021-07-02 VITALS
DIASTOLIC BLOOD PRESSURE: 52 MMHG | TEMPERATURE: 98.5 F | WEIGHT: 204.4 LBS | BODY MASS INDEX: 34.89 KG/M2 | HEIGHT: 64 IN | HEART RATE: 61 BPM | SYSTOLIC BLOOD PRESSURE: 88 MMHG

## 2021-07-02 VITALS
HEIGHT: 64 IN | WEIGHT: 197.2 LBS | HEART RATE: 61 BPM | BODY MASS INDEX: 33.67 KG/M2 | TEMPERATURE: 97.7 F | SYSTOLIC BLOOD PRESSURE: 146 MMHG | DIASTOLIC BLOOD PRESSURE: 59 MMHG

## 2021-07-02 VITALS
HEIGHT: 64 IN | DIASTOLIC BLOOD PRESSURE: 52 MMHG | WEIGHT: 197.2 LBS | SYSTOLIC BLOOD PRESSURE: 139 MMHG | TEMPERATURE: 97.4 F | HEART RATE: 61 BPM | BODY MASS INDEX: 33.67 KG/M2

## 2021-07-02 VITALS
HEIGHT: 64 IN | SYSTOLIC BLOOD PRESSURE: 152 MMHG | BODY MASS INDEX: 33.77 KG/M2 | WEIGHT: 197.8 LBS | TEMPERATURE: 98.2 F | HEART RATE: 61 BPM | DIASTOLIC BLOOD PRESSURE: 62 MMHG

## 2021-07-02 VITALS
SYSTOLIC BLOOD PRESSURE: 122 MMHG | DIASTOLIC BLOOD PRESSURE: 74 MMHG | TEMPERATURE: 97.5 F | HEART RATE: 59 BPM | BODY MASS INDEX: 34.45 KG/M2 | HEIGHT: 64 IN | WEIGHT: 201.8 LBS

## 2021-07-02 VITALS
TEMPERATURE: 97.2 F | HEIGHT: 64 IN | DIASTOLIC BLOOD PRESSURE: 66 MMHG | SYSTOLIC BLOOD PRESSURE: 104 MMHG | HEART RATE: 67 BPM | WEIGHT: 203 LBS | BODY MASS INDEX: 34.66 KG/M2

## 2021-07-08 RX ORDER — BLOOD SUGAR DIAGNOSTIC
STRIP MISCELLANEOUS
Qty: 100 EACH | Refills: 11 | Status: SHIPPED | OUTPATIENT
Start: 2021-07-08 | End: 2022-07-26

## 2021-07-09 DIAGNOSIS — G89.29 CHRONIC BILATERAL LOW BACK PAIN WITH BILATERAL SCIATICA: ICD-10-CM

## 2021-07-09 DIAGNOSIS — M54.42 CHRONIC BILATERAL LOW BACK PAIN WITH BILATERAL SCIATICA: ICD-10-CM

## 2021-07-09 DIAGNOSIS — M54.41 CHRONIC BILATERAL LOW BACK PAIN WITH BILATERAL SCIATICA: ICD-10-CM

## 2021-07-09 RX ORDER — HYDROCODONE BITARTRATE AND ACETAMINOPHEN 7.5; 325 MG/1; MG/1
1 TABLET ORAL 3 TIMES DAILY PRN
Qty: 75 TABLET | Refills: 0 | Status: SHIPPED | OUTPATIENT
Start: 2021-07-09 | End: 2021-08-06 | Stop reason: SDUPTHER

## 2021-07-09 NOTE — TELEPHONE ENCOUNTER
Caller: Meliza Arreola    Relationship: Self    Best call back number: [phone]    Medication needed:   Requested Prescriptions     Pending Prescriptions Disp Refills   • HYDROcodone-acetaminophen (NORCO) 7.5-325 MG per tablet 75 tablet 0     Sig: Take 1 tablet by mouth 3 (Three) Times a Day As Needed for Moderate Pain . for pain       When do you need the refill by: ASAP    What additional details did the patient provide when requesting the medication: LESS THEN 3 DAYS LEFT    Does the patient have less than a 3 day supply:  [x] Yes  [] No    What is the patient's preferred pharmacy: [facility] [address] [place] [address] [facility] [phone] [facility] [phone] FX

## 2021-08-06 ENCOUNTER — TELEPHONE (OUTPATIENT)
Dept: OTHER | Facility: OTHER | Age: 61
End: 2021-08-06

## 2021-08-06 DIAGNOSIS — M54.42 CHRONIC BILATERAL LOW BACK PAIN WITH BILATERAL SCIATICA: ICD-10-CM

## 2021-08-06 DIAGNOSIS — M54.41 CHRONIC BILATERAL LOW BACK PAIN WITH BILATERAL SCIATICA: ICD-10-CM

## 2021-08-06 DIAGNOSIS — G89.29 CHRONIC BILATERAL LOW BACK PAIN WITH BILATERAL SCIATICA: ICD-10-CM

## 2021-08-06 RX ORDER — HYDROCODONE BITARTRATE AND ACETAMINOPHEN 7.5; 325 MG/1; MG/1
1 TABLET ORAL 3 TIMES DAILY PRN
Qty: 75 TABLET | Refills: 0 | Status: SHIPPED | OUTPATIENT
Start: 2021-08-06 | End: 2021-09-03 | Stop reason: SDUPTHER

## 2021-08-06 NOTE — TELEPHONE ENCOUNTER
Caller: Meliza Arreola    Relationship: Self    Best call back number: [phone]     Medication needed:   Requested Prescriptions     Pending Prescriptions Disp Refills   • HYDROcodone-acetaminophen (NORCO) 7.5-325 MG per tablet 75 tablet 0     Sig: Take 1 tablet by mouth 3 (Three) Times a Day As Needed for Moderate Pain . for pain       When do you need the refill by: ASAP    Does the patient have less than a 3 day supply:  [x] Yes  [] No    What is the patient's preferred pharmacy: [place] [address] [address] [facility] [phone] [facility] [phone] FX

## 2021-08-17 RX ORDER — ALBUTEROL SULFATE 2.5 MG/3ML
SOLUTION RESPIRATORY (INHALATION)
Qty: 180 ML | Refills: 0 | Status: SHIPPED | OUTPATIENT
Start: 2021-08-17 | End: 2022-10-11

## 2021-08-19 ENCOUNTER — OFFICE VISIT (OUTPATIENT)
Dept: FAMILY MEDICINE CLINIC | Age: 61
End: 2021-08-19

## 2021-08-19 ENCOUNTER — LAB (OUTPATIENT)
Dept: LAB | Facility: HOSPITAL | Age: 61
End: 2021-08-19

## 2021-08-19 VITALS
TEMPERATURE: 98.4 F | WEIGHT: 203.8 LBS | HEIGHT: 64 IN | SYSTOLIC BLOOD PRESSURE: 101 MMHG | HEART RATE: 59 BPM | DIASTOLIC BLOOD PRESSURE: 45 MMHG | BODY MASS INDEX: 34.79 KG/M2

## 2021-08-19 DIAGNOSIS — E78.2 MIXED HYPERLIPIDEMIA: ICD-10-CM

## 2021-08-19 DIAGNOSIS — I10 ESSENTIAL HYPERTENSION: ICD-10-CM

## 2021-08-19 DIAGNOSIS — F51.01 PRIMARY INSOMNIA: ICD-10-CM

## 2021-08-19 DIAGNOSIS — E11.42 TYPE 2 DIABETES MELLITUS WITH DIABETIC POLYNEUROPATHY, WITHOUT LONG-TERM CURRENT USE OF INSULIN (HCC): ICD-10-CM

## 2021-08-19 DIAGNOSIS — I48.11 LONGSTANDING PERSISTENT ATRIAL FIBRILLATION (HCC): ICD-10-CM

## 2021-08-19 DIAGNOSIS — L71.9 ROSACEA: ICD-10-CM

## 2021-08-19 DIAGNOSIS — E11.42 TYPE 2 DIABETES MELLITUS WITH DIABETIC POLYNEUROPATHY, WITHOUT LONG-TERM CURRENT USE OF INSULIN (HCC): Primary | ICD-10-CM

## 2021-08-19 DIAGNOSIS — G89.4 CHRONIC PAIN SYNDROME: ICD-10-CM

## 2021-08-19 DIAGNOSIS — F33.1 MAJOR DEPRESSIVE DISORDER, RECURRENT EPISODE, MODERATE DEGREE (HCC): ICD-10-CM

## 2021-08-19 DIAGNOSIS — J44.9 CHRONIC OBSTRUCTIVE PULMONARY DISEASE, UNSPECIFIED COPD TYPE (HCC): ICD-10-CM

## 2021-08-19 DIAGNOSIS — Z79.899 HIGH RISK MEDICATION USE: ICD-10-CM

## 2021-08-19 DIAGNOSIS — I49.5 SICK SINUS SYNDROME (HCC): ICD-10-CM

## 2021-08-19 DIAGNOSIS — R51.9 NONINTRACTABLE EPISODIC HEADACHE, UNSPECIFIED HEADACHE TYPE: ICD-10-CM

## 2021-08-19 PROBLEM — M54.50 CHRONIC LOW BACK PAIN: Status: ACTIVE | Noted: 2021-08-19

## 2021-08-19 PROBLEM — G89.29 CHRONIC LOW BACK PAIN: Status: ACTIVE | Noted: 2021-08-19

## 2021-08-19 PROBLEM — F43.10 PTSD (POST-TRAUMATIC STRESS DISORDER): Status: ACTIVE | Noted: 2021-08-19

## 2021-08-19 PROBLEM — G47.33 OBSTRUCTIVE SLEEP APNEA: Status: ACTIVE | Noted: 2021-08-19

## 2021-08-19 PROBLEM — I48.91 ATRIAL FIBRILLATION: Status: ACTIVE | Noted: 2019-03-26

## 2021-08-19 LAB
AMPHET+METHAMPHET UR QL: NEGATIVE
AMPHETAMINES UR QL: NEGATIVE
BARBITURATES UR QL SCN: NEGATIVE
BENZODIAZ UR QL SCN: NEGATIVE
BUPRENORPHINE SERPL-MCNC: NEGATIVE NG/ML
CANNABINOIDS SERPL QL: NEGATIVE
COCAINE UR QL: NEGATIVE
EXPIRATION DATE: NORMAL
HBA1C MFR BLD: 6.7 % (ref 4.8–5.6)
Lab: NORMAL
MDMA UR QL SCN: NEGATIVE
METHADONE UR QL SCN: NEGATIVE
OPIATES UR QL: NEGATIVE
OXYCODONE UR QL SCN: NEGATIVE
PCP UR QL SCN: NEGATIVE

## 2021-08-19 PROCEDURE — 84443 ASSAY THYROID STIM HORMONE: CPT

## 2021-08-19 PROCEDURE — 80053 COMPREHEN METABOLIC PANEL: CPT

## 2021-08-19 PROCEDURE — 80305 DRUG TEST PRSMV DIR OPT OBS: CPT | Performed by: FAMILY MEDICINE

## 2021-08-19 PROCEDURE — 83036 HEMOGLOBIN GLYCOSYLATED A1C: CPT

## 2021-08-19 PROCEDURE — 99214 OFFICE O/P EST MOD 30 MIN: CPT | Performed by: FAMILY MEDICINE

## 2021-08-19 PROCEDURE — 80061 LIPID PANEL: CPT

## 2021-08-19 PROCEDURE — 36415 COLL VENOUS BLD VENIPUNCTURE: CPT

## 2021-08-19 RX ORDER — AMITRIPTYLINE HYDROCHLORIDE 10 MG/1
10 TABLET, FILM COATED ORAL NIGHTLY
Qty: 30 TABLET | Refills: 5 | Status: SHIPPED | OUTPATIENT
Start: 2021-08-19 | End: 2022-02-03

## 2021-08-19 RX ORDER — DILTIAZEM HYDROCHLORIDE 120 MG/1
120 TABLET, FILM COATED ORAL DAILY
COMMUNITY
End: 2021-09-16

## 2021-08-19 RX ORDER — METRONIDAZOLE 7.5 MG/G
GEL TOPICAL DAILY
Qty: 45 G | Refills: 0 | Status: SHIPPED | OUTPATIENT
Start: 2021-08-19 | End: 2022-05-31 | Stop reason: SDUPTHER

## 2021-08-19 NOTE — PROGRESS NOTES
Chief Complaint  Diabetes (3 month f/u)    Subjective          Meliza Arreola presents to [facility] today for routine f/u of chronic issues.    She has been having headaches for the past month or two.  She feels them as a throbbing pain that rings the top of the head like a crown.  No significant photophobia, occasional nausea but no vomiting.    She is on hydrocodone/APAP 2-3x/day for chronic knee, shoulder, and back pain.  She gets #75 per month.  No longer using diclofenac since she had to start Eliquis for atrial fibrillation.  Has done PT and epidurals in the past without significant relief.  She is s/p L TKA and follows with Dr. Stout for generalized OA.  She still does her home exercise regimen.  She takes Lyrica for diabetic peripheral neuropathy.  She has been amitriptyline through Psych for PTSD.  The Lyrica helps quite a bit with the neuropathic pain in the legs and feet.  Poor sleep.      She is on omeprazole for GERD and chronic gastritis.  No indigestion or heartburn.  She occasionally feels bloated but overall, her abd pain is better.      She is on glipizide and Invokana for diabetes.  She is on an ACEI and statin.  She is UTD on foot exam (last done 8/2020).  She is UTD on eye exam, done 8/2020.      She is on diltiazem,  metoprolol succinate, and HCTZ for HTN.  BP has been well controlled.  She denies CP, palpitations since she had her ablations back in May.  She is on atorvastatin for HLD.  She denies myalgias.  She is also taking the diltiazem for rate control.  She has atrial fib and has Watchman Device.  No longer on Eliquis.  S/p pacemaker placement for SSS.  She is on ASA/Plavix for 6 months post Watchman Device and then Cardiology told her that she would be able to stop soon.       She is on Spiriva and Anoro Ellipta for COPD.  She does have some baseline SOB.  She follows with Dr. Mcfarlane.  She has been trying to get a nebulizer so that she can do  albuterol treatments at home, as the rescue inhaler is not always effective enough.      She is on topiramate, clonazepam, Abilify, and hydroxyzine prn for chronic PTSD.  She follows with Dr. Romero [facility] who manages her meds.   She is no longer taking buspirone or clonazepam.      She has DUNCAN and has been very faithful with BiPAP.  She was just seen by her sleep specialist recently.      Current Outpatient Medications:   •  albuterol (PROVENTIL) (2.5 MG/3ML) 0.083% nebulizer solution, USE 1 VIAL IN NEBULIZER EVERY 6 HOURS AS NEEDED FOR SHORTNESS OF AIR/WHEEZING, Disp: 180 mL, Rfl: 0  •  atorvastatin (LIPITOR) 10 MG tablet, Take 10 mg by mouth Daily., Disp: , Rfl:   •  clopidogrel (PLAVIX) 75 MG tablet, Take 75 mg by mouth Daily., Disp: , Rfl:   •  dilTIAZem (CARDIZEM) 120 MG tablet, Take 120 mg by mouth Daily., Disp: , Rfl:   •  glipizide (GLUCOTROL XL) 5 MG ER tablet, Take 1 tablet by mouth twice daily, Disp: 180 tablet, Rfl: 1  •  hydroCHLOROthiazide (MICROZIDE) 12.5 MG capsule, hydrochlorothiazide 12.5 mg oral capsule take 1 capsule (12.5 mg) by oral route once daily   Active, Disp: , Rfl:   •  HYDROcodone-acetaminophen (NORCO) 7.5-325 MG per tablet, Take 1 tablet by mouth 3 (Three) Times a Day As Needed for Moderate Pain . for pain, Disp: 75 tablet, Rfl: 0  •  hydrOXYzine (ATARAX) 50 MG tablet, hydroxyzine HCl 50 mg oral tablet take 1 tablet (50 mg) by oral route 4 times per day   Active, Disp: , Rfl:   •  Invokana 100 MG tablet tablet, Take 1 tablet by mouth Daily., Disp: , Rfl:   •  losartan (COZAAR) 25 MG tablet, Take 25 mg by mouth Daily., Disp: , Rfl:   •  metFORMIN (GLUCOPHAGE) 500 MG tablet, metformin 500 mg oral tablet take 1 tablet (500 mg) by oral route 2 times per day with morning and evening meals   Suspended, Disp: , Rfl:   •  metoprolol succinate XL (TOPROL-XL) 50 MG 24 hr tablet, Take 50 mg by mouth Daily., Disp: , Rfl:   •  omeprazole (priLOSEC) 40 MG capsule, Take 1 capsule by mouth once  "daily, Disp: 90 capsule, Rfl: 1  •  OneTouch Ultra test strip, USE 1 STRIP TO CHECK GLUCOSE TWICE DAILY, Disp: 100 each, Rfl: 11  •  Pacerone 200 MG tablet, Take 200 mg by mouth 2 (Two) Times a Day., Disp: , Rfl:   •  pregabalin (LYRICA) 150 MG capsule, Take 1 capsule by mouth twice daily, Disp: 60 capsule, Rfl: 2  •  albuterol sulfate  (90 Base) MCG/ACT inhaler, albuterol sulfate 90 mcg/actuation inhalation HFA aerosol inhaler inhale 1 puff (90 mcg) by inhalation route every 6 hours as needed   Active, Disp: , Rfl:   •  amiodarone (PACERONE) 100 MG tablet, amiodarone 100 mg oral tablet take 1 tablet (100 mg) by oral route once daily   Active, Disp: , Rfl:   •  amitriptyline (ELAVIL) 10 MG tablet, Take 1 tablet by mouth Every Night., Disp: 30 tablet, Rfl: 5  •  Cartia  MG 24 hr capsule, Take 120 mg by mouth Daily., Disp: , Rfl:   •  metroNIDAZOLE (METROGEL) 0.75 % gel, Apply  topically to the appropriate area as directed Daily., Disp: 45 g, Rfl: 0    Allergies:  Methylprednisolone      Objective   Vital Signs:   /45 (BP Location: Left arm, Patient Position: Sitting, Cuff Size: Large Adult)   Pulse 59   Temp 98.4 °F (36.9 °C) (Oral)   Ht 162.2 cm (63.86\")   Wt 92.4 kg (203 lb 12.8 oz)   BMI 35.14 kg/m²     Physical Exam  Vitals reviewed.   Constitutional:       General: She is not in acute distress.     Appearance: Normal appearance. She is well-developed.   HENT:      Head: Normocephalic and atraumatic.      Right Ear: External ear normal.      Left Ear: External ear normal.      Nose: Nose normal.      Mouth/Throat:      Mouth: Mucous membranes are moist.   Eyes:      Extraocular Movements: Extraocular movements intact.      Conjunctiva/sclera: Conjunctivae normal.      Pupils: Pupils are equal, round, and reactive to light.   Cardiovascular:      Rate and Rhythm: Normal rate and regular rhythm.      Heart sounds: No murmur heard.     Pulmonary:      Effort: Pulmonary effort is normal.      " Breath sounds: Normal breath sounds. No wheezing, rhonchi or rales.   Abdominal:      General: Bowel sounds are normal. There is no distension.      Palpations: Abdomen is soft.      Tenderness: There is no abdominal tenderness.   Musculoskeletal:         General: Normal range of motion.   Neurological:      Mental Status: She is alert.   Psychiatric:         Mood and Affect: Affect normal.             Assessment and Plan    Diagnoses and all orders for this visit:    1. Type 2 diabetes mellitus with diabetic polyneuropathy, without long-term current use of insulin (CMS/formerly [facility]) (Primary)  Assessment & Plan:  She is on glipizide and Invokana for diabetes.  No refills needed.  She is on an ACEI and statin.  She is UTD on foot exam (last done 8/2020).  She is UTD on eye exam, done 8/2020.  Checking labs.  She is on Lyrica for diabetic peripheral neuropathy and that is helpful but she is still having issues with foot pain bilaterally.  I am going to try restarting her on some low-dose amitriptyline at 10 mg daily.  Hopefully we will also get some effects for her insomnia and her headaches.    Orders:  -     Comprehensive Metabolic Panel; Future  -     Lipid Panel; Future  -     Hemoglobin A1c; Future  -     amitriptyline (ELAVIL) 10 MG tablet; Take 1 tablet by mouth Every Night.  Dispense: 30 tablet; Refill: 5    2. Chronic pain syndrome  Assessment & Plan:  Stable on current regimen.  Symptoms are well controlled.  No adverse effects. She does require ongoing use of this controlled substance to function.  Tox screen was due today.  Prior tox screen appropriate.  Huber was run today.  Refills were not needed today.  RTC 3 months.        3. High risk medication use  -     POC Urine Drug Screen [facility]    4. Essential hypertension  Assessment & Plan:  BP at goal.  Continue meds.  Labs ordered.      5. Mixed hyperlipidemia  Assessment & Plan:  Stable.  No refills needed.  Labs ordered.      6. Longstanding persistent  atrial fibrillation (CMS/HCC)  Assessment & Plan:  Doing much better since her ablations back in May.  She has not really noticed any episodes back in a-fib since that time.  No refills needed.  Checking labs.  She follows with cardiology.    Orders:  -     TSH; Future    7. Sick sinus syndrome (CMS/HCC)    8. Major depressive disorder, recurrent episode, moderate degree (CMS/HCC)  Assessment & Plan:  Follows with psychiatry.      9. Chronic obstructive pulmonary disease, unspecified COPD type (CMS/HCC)    10. Rosacea  -     metroNIDAZOLE (METROGEL) 0.75 % gel; Apply  topically to the appropriate area as directed Daily.  Dispense: 45 g; Refill: 0    11. Nonintractable episodic headache, unspecified headache type  Assessment & Plan:  As per diabetes plan.    Orders:  -     amitriptyline (ELAVIL) 10 MG tablet; Take 1 tablet by mouth Every Night.  Dispense: 30 tablet; Refill: 5    12. Primary insomnia  Assessment & Plan:  As per diabetes plan.    Orders:  -     amitriptyline (ELAVIL) 10 MG tablet; Take 1 tablet by mouth Every Night.  Dispense: 30 tablet; Refill: 5      Follow Up   Return in about 3 months (around 11/19/2021) for Recheck.  Patient was given instructions and counseling regarding her condition or for health maintenance advice. Please see specific information pulled into the AVS if appropriate.

## 2021-08-19 NOTE — ASSESSMENT & PLAN NOTE
Doing much better since her ablations back in May.  She has not really noticed any episodes back in a-fib since that time.  No refills needed.  Checking labs.  She follows with cardiology.

## 2021-08-19 NOTE — ASSESSMENT & PLAN NOTE
She is on glipizide and Invokana for diabetes.  No refills needed.  She is on an ACEI and statin.  She is UTD on foot exam (last done 8/2020).  She is UTD on eye exam, done 8/2020.  Checking labs.  She is on Lyrica for diabetic peripheral neuropathy and that is helpful but she is still having issues with foot pain bilaterally.  I am going to try restarting her on some low-dose amitriptyline at 10 mg daily.  Hopefully we will also get some effects for her insomnia and her headaches.

## 2021-08-19 NOTE — ASSESSMENT & PLAN NOTE
Stable on current regimen.  Symptoms are well controlled.  No adverse effects. She does require ongoing use of this controlled substance to function.  Tox screen was due today.  Prior tox screen appropriate.  Huber was run today.  Refills were not needed today.  RTC 3 months.

## 2021-08-20 LAB
ALBUMIN SERPL-MCNC: 4.3 G/DL (ref 3.5–5.2)
ALBUMIN/GLOB SERPL: 1.5 G/DL
ALP SERPL-CCNC: 100 U/L (ref 39–117)
ALT SERPL W P-5'-P-CCNC: 46 U/L (ref 1–33)
ANION GAP SERPL CALCULATED.3IONS-SCNC: 11 MMOL/L (ref 5–15)
AST SERPL-CCNC: 33 U/L (ref 1–32)
BILIRUB SERPL-MCNC: 0.2 MG/DL (ref 0–1.2)
BUN SERPL-MCNC: 20 MG/DL (ref 6–20)
BUN/CREAT SERPL: 20 (ref 7–25)
CALCIUM SPEC-SCNC: 9.4 MG/DL (ref 8.6–10.5)
CHLORIDE SERPL-SCNC: 106 MMOL/L (ref 98–107)
CHOLEST SERPL-MCNC: 174 MG/DL (ref 0–200)
CO2 SERPL-SCNC: 25 MMOL/L (ref 22–29)
CREAT SERPL-MCNC: 1 MG/DL (ref 0.57–1)
GFR SERPL CREATININE-BSD FRML MDRD: 57 ML/MIN/1.73
GLOBULIN UR ELPH-MCNC: 2.8 GM/DL
GLUCOSE SERPL-MCNC: 98 MG/DL (ref 65–99)
HDLC SERPL-MCNC: 47 MG/DL (ref 40–60)
LDLC SERPL CALC-MCNC: 89 MG/DL (ref 0–100)
LDLC/HDLC SERPL: 1.75 {RATIO}
POTASSIUM SERPL-SCNC: 4 MMOL/L (ref 3.5–5.2)
PROT SERPL-MCNC: 7.1 G/DL (ref 6–8.5)
SODIUM SERPL-SCNC: 142 MMOL/L (ref 136–145)
TRIGL SERPL-MCNC: 224 MG/DL (ref 0–150)
TSH SERPL DL<=0.05 MIU/L-ACNC: 2.09 UIU/ML (ref 0.27–4.2)
VLDLC SERPL-MCNC: 38 MG/DL (ref 5–40)

## 2021-09-02 RX ORDER — CANAGLIFLOZIN 100 MG/1
TABLET, FILM COATED ORAL
Qty: 90 TABLET | Refills: 1 | Status: SHIPPED | OUTPATIENT
Start: 2021-09-02 | End: 2022-03-01

## 2021-09-03 DIAGNOSIS — M54.41 CHRONIC BILATERAL LOW BACK PAIN WITH BILATERAL SCIATICA: ICD-10-CM

## 2021-09-03 DIAGNOSIS — G89.29 CHRONIC BILATERAL LOW BACK PAIN WITH BILATERAL SCIATICA: ICD-10-CM

## 2021-09-03 DIAGNOSIS — M54.42 CHRONIC BILATERAL LOW BACK PAIN WITH BILATERAL SCIATICA: ICD-10-CM

## 2021-09-03 RX ORDER — HYDROCODONE BITARTRATE AND ACETAMINOPHEN 7.5; 325 MG/1; MG/1
1 TABLET ORAL 3 TIMES DAILY PRN
Qty: 75 TABLET | Refills: 0 | Status: SHIPPED | OUTPATIENT
Start: 2021-09-03 | End: 2021-10-01 | Stop reason: SDUPTHER

## 2021-09-03 NOTE — TELEPHONE ENCOUNTER
Caller: Meliza Arreola    Relationship: Self    Best call back number: [phone]    Medication needed:   Requested Prescriptions     Pending Prescriptions Disp Refills   • HYDROcodone-acetaminophen (NORCO) 7.5-325 MG per tablet 75 tablet 0     Sig: Take 1 tablet by mouth 3 (Three) Times a Day As Needed for Moderate Pain . for pain       When do you need the refill by: ASAP    What additional details did the patient provide when requesting the medication: PATIENT STATED SHE HAS ENOUGH FOR TODAY AND TOMORROW.     Does the patient have less than a 3 day supply:  [x] Yes  [] No    What is the patient's preferred pharmacy: [facility] [address] [place], [place] [address] [address] [facility] [phone] [facility] [phone] FX

## 2021-09-12 DIAGNOSIS — E11.42 DIABETIC PERIPHERAL NEUROPATHY (HCC): ICD-10-CM

## 2021-09-13 RX ORDER — PREGABALIN 150 MG/1
CAPSULE ORAL
Qty: 60 CAPSULE | Refills: 2 | Status: SHIPPED | OUTPATIENT
Start: 2021-09-13 | End: 2021-12-08

## 2021-09-15 ENCOUNTER — TELEPHONE (OUTPATIENT)
Dept: FAMILY MEDICINE CLINIC | Age: 61
End: 2021-09-15

## 2021-09-15 NOTE — TELEPHONE ENCOUNTER
Caller: Meliza Arreola    Relationship to patient: Self    Best call back number: [phone]     Date of exposure: SPOUSE HAD COVID    COVID19 symptoms: COUGH, NAUSEA, FEVER 101, FATIGUE    Additional information or concerns: PATIENT REPORTS HAVING SYMPTOMS FOR 5 DAYS, REPORTS NO SHORTNESS OF AIR OR TROUBLE BREATHING.     PATIENT REQUESTS CALLBACK TO FURTHER DISCUSS     What is the patients preferred pharmacy: [place] [id number] [facility] [phone] [facility] [phone] FX

## 2021-09-15 NOTE — TELEPHONE ENCOUNTER
Pt requesting something for her nausea.  Is treating all other symptoms with OTC med.  Please advise.

## 2021-09-15 NOTE — TELEPHONE ENCOUNTER
I think it would be best if we did a telehealth visit for Meliza tomorrow so that I can make sure she is doing okay.  This is almost certainly Covid of her spouse had Covid and she is not vaccinated.  She also has significant risk factors for adverse outcomes from Covid.  Okay to work her into my schedule tomorrow.  Thanks, ZIA

## 2021-09-16 ENCOUNTER — OFFICE VISIT (OUTPATIENT)
Dept: FAMILY MEDICINE CLINIC | Age: 61
End: 2021-09-16

## 2021-09-16 VITALS — HEART RATE: 60 BPM | OXYGEN SATURATION: 94 %

## 2021-09-16 DIAGNOSIS — R50.9 FEVER, UNSPECIFIED FEVER CAUSE: ICD-10-CM

## 2021-09-16 DIAGNOSIS — Z20.822 SUSPECTED COVID-19 VIRUS INFECTION: Primary | ICD-10-CM

## 2021-09-16 PROCEDURE — 99213 OFFICE O/P EST LOW 20 MIN: CPT | Performed by: FAMILY MEDICINE

## 2021-09-16 RX ORDER — ONDANSETRON 4 MG/1
4 TABLET, ORALLY DISINTEGRATING ORAL EVERY 8 HOURS PRN
Qty: 20 TABLET | Refills: 0 | Status: SHIPPED | OUTPATIENT
Start: 2021-09-16 | End: 2022-07-08

## 2021-09-16 RX ORDER — BENZONATATE 100 MG/1
100 CAPSULE ORAL 3 TIMES DAILY PRN
Qty: 30 CAPSULE | Refills: 0 | Status: SHIPPED | OUTPATIENT
Start: 2021-09-16 | End: 2022-07-08

## 2021-09-16 NOTE — ASSESSMENT & PLAN NOTE
Meliza is almost certainly battling a Covid infection with symptom constellation of fever, body aches, cough, congestion and nausea after being exposed to her  she was diagnosed with Covid 2 weeks ago.  She is on day 5 of symptoms today.  We discussed that we would have the option to get her in for a monoclonal antibody treatment given her risk factors of diabetes, hypertension, and obesity.  However, she states that she is feeling too bad to come out and get tested first and then get over to [facility] for the antibody infusion.  We did discuss that her risk factors may put her in danger of significant adverse effects from Covid, especially when she hits the 7 to 10-day period.  She is willing to monitor symptoms at home and take her chances.  I am going to send her in some Tessalon Perles and Zofran for symptomatic control of the cough and nausea which are her to major symptoms today.  The fevers have also been troublesome, so she can add in some additional Tylenol to her baseline Norco.  We did discuss that she should keep her total daily amount of Tylenol at 3000 mg or less.  She has a pulse ox at home has been monitoring oxygen levels which so far have remained acceptable.  I would like her to continue to monitor that closely.  If her oxygen levels drop down below 90% consistently or if she develops moderate to severe shortness of breath or chest pain or indications of a blood clot, she will need to present to the emergency room immediately.  Otherwise, continue conservative management and symptomatic control at home.  If she needs to get back in with me, I will work her in to my schedule.

## 2021-09-16 NOTE — PROGRESS NOTES
"Chief Complaint  Fever (fever, nausea, body aches X 5 days ( [phone]))    Subjective          Meliza Arreola presents to [facility] today for acute complaint of fever and body aches for the past 5 days.  She reports positive fatigue and malaise, positive fevers (Tmax 101), positive chills, negative headaches, negative rhinorrhea, positive congestion, negative sore throat.  She reports positive cough, productive of green sputum, negative chest pain, negative shortness of breath.  She has positive abdominal pain, positive nausea, negative vomiting (she has been able to keep fluids down), positive diarrhea, positive body aches.  Sick contacts:   with COVID \"a couple of weeks ago.\"  She has not been vaccinated.    She has a pulse ox at home and she has been running 95-96% with occasional drops down to 92%.      Objective   Vital Signs:   Pulse 60   SpO2 94% Comment: RA    Physical Exam  Constitutional:       Appearance: Normal appearance.   HENT:      Head: Normocephalic and atraumatic.   Eyes:      Extraocular Movements: Extraocular movements intact.      Conjunctiva/sclera: Conjunctivae normal.   Pulmonary:      Effort: Pulmonary effort is normal. No respiratory distress.   Musculoskeletal:         General: Normal range of motion.   Skin:     General: Skin is warm and dry.   Neurological:      General: No focal deficit present.      Mental Status: She is alert and oriented to person, place, and time.   Psychiatric:         Mood and Affect: Mood normal.         Behavior: Behavior normal.         Thought Content: Thought content normal.         Judgment: Judgment normal.             Assessment and Plan    Diagnoses and all orders for this visit:    1. Suspected COVID-19 virus infection (Primary)  Assessment & Plan:  Meliza is almost certainly battling a Covid infection with symptom constellation of fever, body aches, cough, congestion and nausea after being exposed " to her  she was diagnosed with Covid 2 weeks ago.  She is on day 5 of symptoms today.  We discussed that we would have the option to get her in for a monoclonal antibody treatment given her risk factors of diabetes, hypertension, and obesity.  However, she states that she is feeling too bad to come out and get tested first and then get over to [facility] for the antibody infusion.  We did discuss that her risk factors may put her in danger of significant adverse effects from Covid, especially when she hits the 7 to 10-day period.  She is willing to monitor symptoms at home and take her chances.  I am going to send her in some Tessalon Perles and Zofran for symptomatic control of the cough and nausea which are her to major symptoms today.  The fevers have also been troublesome, so she can add in some additional Tylenol to her baseline Norco.  We did discuss that she should keep her total daily amount of Tylenol at 3000 mg or less.  She has a pulse ox at home has been monitoring oxygen levels which so far have remained acceptable.  I would like her to continue to monitor that closely.  If her oxygen levels drop down below 90% consistently or if she develops moderate to severe shortness of breath or chest pain or indications of a blood clot, she will need to present to the emergency room immediately.  Otherwise, continue conservative management and symptomatic control at home.  If she needs to get back in with me, I will work her in to my schedule.      2. Fever, unspecified fever cause        Follow Up   No follow-ups on file.  Patient was given instructions and counseling regarding her condition or for health maintenance advice. Please see specific information pulled into the AVS if appropriate.     Meliza Arreola has consented to use a telehealth visit for her medical care today: Yes.  Additional staff present for the encounter was Yeimi Murillo MA.

## 2021-09-27 RX ORDER — ALBUTEROL SULFATE 90 UG/1
AEROSOL, METERED RESPIRATORY (INHALATION)
Qty: 18 G | Refills: 0 | Status: SHIPPED | OUTPATIENT
Start: 2021-09-27 | End: 2022-09-12

## 2021-10-01 DIAGNOSIS — M54.42 CHRONIC BILATERAL LOW BACK PAIN WITH BILATERAL SCIATICA: ICD-10-CM

## 2021-10-01 DIAGNOSIS — M54.41 CHRONIC BILATERAL LOW BACK PAIN WITH BILATERAL SCIATICA: ICD-10-CM

## 2021-10-01 DIAGNOSIS — G89.29 CHRONIC BILATERAL LOW BACK PAIN WITH BILATERAL SCIATICA: ICD-10-CM

## 2021-10-01 RX ORDER — HYDROCODONE BITARTRATE AND ACETAMINOPHEN 7.5; 325 MG/1; MG/1
1 TABLET ORAL 3 TIMES DAILY PRN
Qty: 75 TABLET | Refills: 0 | Status: SHIPPED | OUTPATIENT
Start: 2021-10-01 | End: 2021-11-01 | Stop reason: SDUPTHER

## 2021-10-01 NOTE — TELEPHONE ENCOUNTER
Caller: Meliza Arreola    Relationship: Self      Medication requested (name and dosage): HYDROcodone-acetaminophen (NORCO) 7.5-325 MG per tablet    Pharmacy where request should be sent: [place] [address] [address] [facility] - [phone]  - [phone]   [phone]    Additional details provided by patient: PATIENT HAS TWO DAYS    Best call back number: [phone]    Does the patient have less than a 3 day supply:  [x] Yes  [] No    Srinivasan Stokes Rep   10/01/21 09:07 EDT
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
## 2021-10-20 RX ORDER — ATORVASTATIN CALCIUM 10 MG/1
TABLET, FILM COATED ORAL
Qty: 90 TABLET | Refills: 0 | Status: SHIPPED | OUTPATIENT
Start: 2021-10-20 | End: 2022-01-10

## 2021-11-01 DIAGNOSIS — G89.29 CHRONIC BILATERAL LOW BACK PAIN WITH BILATERAL SCIATICA: ICD-10-CM

## 2021-11-01 DIAGNOSIS — M54.42 CHRONIC BILATERAL LOW BACK PAIN WITH BILATERAL SCIATICA: ICD-10-CM

## 2021-11-01 DIAGNOSIS — M54.41 CHRONIC BILATERAL LOW BACK PAIN WITH BILATERAL SCIATICA: ICD-10-CM

## 2021-11-01 NOTE — TELEPHONE ENCOUNTER
Caller: Meliza Arreola    Relationship: Self      Medication requested (name and dosage):     Requested Prescriptions:   Requested Prescriptions     Pending Prescriptions Disp Refills   • HYDROcodone-acetaminophen (NORCO) 7.5-325 MG per tablet 75 tablet 0     Sig: Take 1 tablet by mouth 3 (Three) Times a Day As Needed for Moderate Pain . for pain        Pharmacy where request should be sent: [facility] [address] [place] [address] [place] [facility] [phone] [facility] [phone]     Additional details provided by patient: JUST TOOK HER LAST PILL    Best call back number: [phone]     Does the patient have less than a 3 day supply:  [x] Yes  [] No    Srinivasan SINCLAIR Rep   11/01/21 08:53 EDT

## 2021-11-02 RX ORDER — HYDROCODONE BITARTRATE AND ACETAMINOPHEN 7.5; 325 MG/1; MG/1
1 TABLET ORAL 3 TIMES DAILY PRN
Qty: 75 TABLET | Refills: 0 | Status: SHIPPED | OUTPATIENT
Start: 2021-11-02 | End: 2021-11-30 | Stop reason: SDUPTHER

## 2021-11-30 ENCOUNTER — TELEPHONE (OUTPATIENT)
Dept: FAMILY MEDICINE CLINIC | Age: 61
End: 2021-11-30

## 2021-11-30 DIAGNOSIS — M54.41 CHRONIC BILATERAL LOW BACK PAIN WITH BILATERAL SCIATICA: ICD-10-CM

## 2021-11-30 DIAGNOSIS — G89.29 CHRONIC BILATERAL LOW BACK PAIN WITH BILATERAL SCIATICA: ICD-10-CM

## 2021-11-30 DIAGNOSIS — M54.42 CHRONIC BILATERAL LOW BACK PAIN WITH BILATERAL SCIATICA: ICD-10-CM

## 2021-11-30 RX ORDER — HYDROCODONE BITARTRATE AND ACETAMINOPHEN 7.5; 325 MG/1; MG/1
1 TABLET ORAL 3 TIMES DAILY PRN
Qty: 75 TABLET | Refills: 0 | Status: SHIPPED | OUTPATIENT
Start: 2021-11-30 | End: 2021-12-27 | Stop reason: SDUPTHER

## 2021-11-30 NOTE — TELEPHONE ENCOUNTER
Caller: Meliza Arreola    Relationship: Self    Best call back number: [phone]    Requested Prescriptions:   Requested Prescriptions     Pending Prescriptions Disp Refills   • HYDROcodone-acetaminophen (NORCO) 7.5-325 MG per tablet 75 tablet 0     Sig: Take 1 tablet by mouth 3 (Three) Times a Day As Needed for Moderate Pain . for pain        Pharmacy where request should be sent: [facility] [address] [place] [address] [place] [address] [facility] [phone] [facility] [phone] FX     Additional details provided by patient: PATIENT HAS ENOUGH FOR TODAY    Does the patient have less than a 3 day supply:  [x] Yes  [] No    Srinivasan Rasmussen Rep   11/30/21 09:27 EST

## 2021-12-02 ENCOUNTER — OFFICE VISIT (OUTPATIENT)
Dept: FAMILY MEDICINE CLINIC | Age: 61
End: 2021-12-02

## 2021-12-02 VITALS
DIASTOLIC BLOOD PRESSURE: 76 MMHG | BODY MASS INDEX: 35 KG/M2 | HEART RATE: 62 BPM | SYSTOLIC BLOOD PRESSURE: 105 MMHG | WEIGHT: 205 LBS | HEIGHT: 64 IN | TEMPERATURE: 98.6 F

## 2021-12-02 DIAGNOSIS — I10 ESSENTIAL HYPERTENSION: ICD-10-CM

## 2021-12-02 DIAGNOSIS — Z86.16 HISTORY OF COVID-19: ICD-10-CM

## 2021-12-02 DIAGNOSIS — E78.2 MIXED HYPERLIPIDEMIA: ICD-10-CM

## 2021-12-02 DIAGNOSIS — G89.4 CHRONIC PAIN SYNDROME: Primary | ICD-10-CM

## 2021-12-02 DIAGNOSIS — E11.42 TYPE 2 DIABETES MELLITUS WITH DIABETIC POLYNEUROPATHY, WITHOUT LONG-TERM CURRENT USE OF INSULIN (HCC): ICD-10-CM

## 2021-12-02 DIAGNOSIS — Z79.899 HIGH RISK MEDICATION USE: ICD-10-CM

## 2021-12-02 DIAGNOSIS — I48.11 LONGSTANDING PERSISTENT ATRIAL FIBRILLATION (HCC): ICD-10-CM

## 2021-12-02 PROBLEM — M54.42 CHRONIC BILATERAL LOW BACK PAIN WITH BILATERAL SCIATICA: Status: ACTIVE | Noted: 2021-08-19

## 2021-12-02 PROBLEM — M54.41 CHRONIC BILATERAL LOW BACK PAIN WITH BILATERAL SCIATICA: Status: ACTIVE | Noted: 2021-08-19

## 2021-12-02 LAB
AMPHET+METHAMPHET UR QL: NEGATIVE
AMPHETAMINES UR QL: NEGATIVE
BARBITURATES UR QL SCN: NEGATIVE
BENZODIAZ UR QL SCN: NEGATIVE
BUPRENORPHINE SERPL-MCNC: NEGATIVE NG/ML
CANNABINOIDS SERPL QL: NEGATIVE
COCAINE UR QL: NEGATIVE
EXPIRATION DATE: NORMAL
Lab: NORMAL
MDMA UR QL SCN: NEGATIVE
METHADONE UR QL SCN: NEGATIVE
OPIATES UR QL: NEGATIVE
OXYCODONE UR QL SCN: NEGATIVE
PCP UR QL SCN: NEGATIVE

## 2021-12-02 PROCEDURE — 99214 OFFICE O/P EST MOD 30 MIN: CPT | Performed by: FAMILY MEDICINE

## 2021-12-02 PROCEDURE — 80307 DRUG TEST PRSMV CHEM ANLYZR: CPT | Performed by: FAMILY MEDICINE

## 2021-12-02 PROCEDURE — 80305 DRUG TEST PRSMV DIR OPT OBS: CPT | Performed by: FAMILY MEDICINE

## 2021-12-02 NOTE — ASSESSMENT & PLAN NOTE
Meliza does report quite more pain today, despite being on Norco 7.5/325 2-3 times daily (#75/month) and Lyrica 150 mg twice daily.  The pain is multifactorial, relating to her diffuse osteoarthritis, chronic low back pain with radiculopathy, and diabetic peripheral neuropathy.  We did talk today about the possibility of referring her over to Dr. Taylor Hillman at [facility] to review her medications and treatment options to see if we could get better control of her pain.  Meliza is somewhat reluctant to do so at this time, but states she will think about it and get back to me to let me know.  If she chooses to do that, we will go ahead and get the ball rolling for her.  No adverse effects with her current medications. She does require ongoing use of this controlled substance to function.  Tox screen was due today.  Prior tox screen appropriate.  Huber was run today.  Refills were not needed today.  RTC 3 months.

## 2021-12-02 NOTE — ASSESSMENT & PLAN NOTE
Doing much better since her ablation.  She is now on amiodarone for rhythm control with metoprolol succinate and diltiazem for rate control.  Also has the watchman device so she is not currently on anticoagulation, but continues on Plavix for now.  Following with Cardiology.

## 2021-12-02 NOTE — PROGRESS NOTES
"Chief Complaint  Hypertension (3 month f/u)    Subjective          Meliza Arreola presents to [facility] today for routine follow-up on chronic issues.    She is on Norco 2-3 times daily for chronic pain of the knee, shoulder, and back.  She can't hardly do any activity at all \"without being down for 2-3 days.\"  She can't walk very far.  The neuropathy is really bothering her.She uses #75 per month.    She was previously on diclofenac but stopped that when she went on anticoagulation with Eliquis for a fib.  (She has since been switched to Plavix when the Watchman device was placed.)  Status post PT and epidurals in the past without significant relief.  She is s/p L TKA.  Following with Dr. Stout for diffuse osteoarthritis.  She does her home exercises pretty regularly.  She has continued to struggle with a lot of diffuse muscular pain.         She is on omeprazole for GERD and chronic gastritis.  No indigestion or heartburn.  She occasionally feels bloated but overall, her abd pain is better.      She is on glipizide and Invokana for diabetes.  She is on an ACEI and statin.  She is due for foot exam, last done 8/2020.  She is UTD on eye exam, reportedly last done 7/2021.  She is on Lyrica for diabetic peripheral neuropathy.  No adverse effects.  Her neuropathic pain seems to be worsening.  The pain in the feet can be quite extreme.      She is on losartan, metoprolol succinate, diltiazem, and amiodarone for atrial fibrillation.  She is also on hydrochlorothiazide for HTN.  BP has been well controlled.    She is on atorvastatin for cholesterol.    No problems with myalgias.  She has a fib and has the watchman Device.    She is now on Eliquis and no longer taking anticoagulation S/p pacemaker placement for SSS.       She is on Anoro Ellipta and Spiriva for COPD.  She does have some baseline SOB.    Following with Dr. Mcfarlane.      She is on amitriptyline, topiramate, clonazepam, " Abilify, and hydroxyzine prn for chronic PTSD.  She follows with Dr. Romero Psychiatry who manages her meds.   She is no longer taking buspirone or clonazepam.    She is on omeprazole for GERD.      She has DUNCAN and has been very faithful with BiPAP.  She was just seen by her sleep specialist recently.      Current Outpatient Medications:   •  albuterol (PROVENTIL) (2.5 MG/3ML) 0.083% nebulizer solution, USE 1 VIAL IN NEBULIZER EVERY 6 HOURS AS NEEDED FOR SHORTNESS OF AIR/WHEEZING, Disp: 180 mL, Rfl: 0  •  albuterol sulfate  (90 Base) MCG/ACT inhaler, Inhale 2 puffs by mouth twice daily, Disp: 18 g, Rfl: 0  •  amitriptyline (ELAVIL) 10 MG tablet, Take 1 tablet by mouth Every Night., Disp: 30 tablet, Rfl: 5  •  Anoro Ellipta 62.5-25 MCG/INH aerosol powder  inhaler, , Disp: , Rfl:   •  atorvastatin (LIPITOR) 10 MG tablet, Take 1 tablet by mouth once daily, Disp: 90 tablet, Rfl: 0  •  benzonatate (Tessalon Perles) 100 MG capsule, Take 1 capsule by mouth 3 (Three) Times a Day As Needed for Cough., Disp: 30 capsule, Rfl: 0  •  Cartia  MG 24 hr capsule, Take 120 mg by mouth Daily., Disp: , Rfl:   •  clopidogrel (PLAVIX) 75 MG tablet, Take 75 mg by mouth Daily., Disp: , Rfl:   •  glipizide (GLUCOTROL XL) 5 MG ER tablet, Take 1 tablet by mouth twice daily, Disp: 180 tablet, Rfl: 1  •  hydroCHLOROthiazide (MICROZIDE) 12.5 MG capsule, hydrochlorothiazide 12.5 mg oral capsule take 1 capsule (12.5 mg) by oral route once daily   Active, Disp: , Rfl:   •  HYDROcodone-acetaminophen (NORCO) 7.5-325 MG per tablet, Take 1 tablet by mouth 3 (Three) Times a Day As Needed for Moderate Pain . for pain, Disp: 75 tablet, Rfl: 0  •  hydrOXYzine (ATARAX) 50 MG tablet, hydroxyzine HCl 50 mg oral tablet take 1 tablet (50 mg) by oral route 4 times per day   Active, Disp: , Rfl:   •  Invokana 100 MG tablet tablet, Take 1 tablet by mouth once daily, Disp: 90 tablet, Rfl: 1  •  losartan (COZAAR) 25 MG tablet, Take 25 mg by mouth Daily.,  "Disp: , Rfl:   •  metoprolol succinate XL (TOPROL-XL) 50 MG 24 hr tablet, Take 50 mg by mouth Daily., Disp: , Rfl:   •  metroNIDAZOLE (METROGEL) 0.75 % gel, Apply  topically to the appropriate area as directed Daily., Disp: 45 g, Rfl: 0  •  omeprazole (priLOSEC) 40 MG capsule, Take 1 capsule by mouth once daily, Disp: 90 capsule, Rfl: 1  •  ondansetron ODT (Zofran ODT) 4 MG disintegrating tablet, Place 1 tablet on the tongue Every 8 (Eight) Hours As Needed for Nausea or Vomiting., Disp: 20 tablet, Rfl: 0  •  OneTouch Ultra test strip, USE 1 STRIP TO CHECK GLUCOSE TWICE DAILY, Disp: 100 each, Rfl: 11  •  Pacerone 200 MG tablet, Take 200 mg by mouth 2 (Two) Times a Day., Disp: , Rfl:   •  pregabalin (LYRICA) 150 MG capsule, Take 1 capsule by mouth twice daily, Disp: 60 capsule, Rfl: 2    Allergies:  Methylprednisolone      Objective   Vital Signs:   /76 (BP Location: Right arm, Patient Position: Sitting)   Pulse 62   Temp 98.6 °F (37 °C) (Oral)   Ht 162.2 cm (63.86\")   Wt 93 kg (205 lb)   BMI 35.34 kg/m²     Physical Exam  Vitals reviewed.   Constitutional:       General: She is not in acute distress.     Appearance: Normal appearance. She is well-developed.   HENT:      Head: Normocephalic and atraumatic.      Right Ear: External ear normal.      Left Ear: External ear normal.      Nose: Nose normal.      Mouth/Throat:      Mouth: Mucous membranes are moist.   Eyes:      Extraocular Movements: Extraocular movements intact.      Conjunctiva/sclera: Conjunctivae normal.      Pupils: Pupils are equal, round, and reactive to light.   Cardiovascular:      Rate and Rhythm: Normal rate and regular rhythm.      Pulses:           Dorsalis pedis pulses are 2+ on the right side and 2+ on the left side.      Heart sounds: No murmur heard.      Pulmonary:      Effort: Pulmonary effort is normal.      Breath sounds: Normal breath sounds. No wheezing, rhonchi or rales.   Abdominal:      General: Bowel sounds are normal. " There is no distension.      Palpations: Abdomen is soft.      Tenderness: There is no abdominal tenderness.   Musculoskeletal:         General: Normal range of motion.   Feet:      Right foot:      Protective Sensation: 3 sites tested. 1 site sensed.     Skin integrity: Skin integrity normal. No ulcer or blister.      Toenail Condition: Right toenails are normal.      Left foot:      Protective Sensation: 3 sites tested. 2 sites sensed.      Skin integrity: Skin integrity normal. No ulcer or blister.      Toenail Condition: Left toenails are normal.      Comments: Diabetic Foot Exam Performed     Neurological:      Mental Status: She is alert.   Psychiatric:         Mood and Affect: Affect normal.             Assessment and Plan    Diagnoses and all orders for this visit:    1. Chronic pain syndrome (Primary)  Assessment & Plan:  Meliza does report quite more pain today, despite being on Norco 7.5/325 2-3 times daily (#75/month) and Lyrica 150 mg twice daily.  The pain is multifactorial, relating to her diffuse osteoarthritis, chronic low back pain with radiculopathy, and diabetic peripheral neuropathy.  We did talk today about the possibility of referring her over to Dr. Taylor Hillman at [facility] to review her medications and treatment options to see if we could get better control of her pain.  Meliza is somewhat reluctant to do so at this time, but states she will think about it and get back to me to let me know.  If she chooses to do that, we will go ahead and get the ball rolling for her.  No adverse effects with her current medications. She does require ongoing use of this controlled substance to function.  Tox screen was due today.  Prior tox screen appropriate.  Huber was run today.  Refills were not needed today.  RTC 3 months.        2. High risk medication use  -     POC Urine Drug Screen [facility]    3. Type 2 diabetes mellitus with diabetic polyneuropathy, without long-term current  use of insulin (HCC)  Assessment & Plan:  She is on glipizide and Invokana for diabetes.    No refills needed.  Her last A1c looks fantastic at sub-seven.  She has clearly been working hard.  She is on an ACEI and statin.  She is due for foot exam, last done 8/2020; exam today shows decreased sensation bilaterally.  She is UTD on eye exam, reportedly last done 7/2021; records requested.  She is on Lyrica for diabetic peripheral neuropathy.  No adverse effects.  Her neuropathic pain seems to be worsening.  The pain in the feet can be quite extreme.  See chronic pain plan.      4. Essential hypertension  Assessment & Plan:  BP at goal.  No refills needed.  Labs reviewed and up-to-date.      5. Mixed hyperlipidemia  Assessment & Plan:  Stable.  Labs reviewed and up-to-date.      6. Longstanding persistent atrial fibrillation (HCC)  Assessment & Plan:  Doing much better since her ablation.  She is now on amiodarone for rhythm control with metoprolol succinate and diltiazem for rate control.  Also has the watchman device so she is not currently on anticoagulation, but continues on Plavix for now.  Following with Cardiology.      7. History of COVID-19  Assessment & Plan:  She has recovered although notices some persistent hair loss.        Follow Up   Return in about 3 months (around 3/2/2022).  Patient was given instructions and counseling regarding her condition or for health maintenance advice. Please see specific information pulled into the AVS if appropriate.

## 2021-12-02 NOTE — ASSESSMENT & PLAN NOTE
She is on glipizide and Invokana for diabetes.    No refills needed.  Her last A1c looks fantastic at sub-seven.  She has clearly been working hard.  She is on an ACEI and statin.  She is due for foot exam, last done 8/2020; exam today shows decreased sensation bilaterally.  She is UTD on eye exam, reportedly last done 7/2021; records requested.  She is on Lyrica for diabetic peripheral neuropathy.  No adverse effects.  Her neuropathic pain seems to be worsening.  The pain in the feet can be quite extreme.  See chronic pain plan.

## 2021-12-08 DIAGNOSIS — E11.42 DIABETIC PERIPHERAL NEUROPATHY (HCC): ICD-10-CM

## 2021-12-08 RX ORDER — GLIPIZIDE 5 MG/1
TABLET, FILM COATED, EXTENDED RELEASE ORAL
Qty: 180 TABLET | Refills: 1 | Status: SHIPPED | OUTPATIENT
Start: 2021-12-08 | End: 2022-05-11

## 2021-12-08 RX ORDER — OMEPRAZOLE 40 MG/1
CAPSULE, DELAYED RELEASE ORAL
Qty: 90 CAPSULE | Refills: 1 | Status: SHIPPED | OUTPATIENT
Start: 2021-12-08 | End: 2022-06-13

## 2021-12-08 RX ORDER — PREGABALIN 150 MG/1
CAPSULE ORAL
Qty: 60 CAPSULE | Refills: 2 | Status: SHIPPED | OUTPATIENT
Start: 2021-12-08 | End: 2022-03-15

## 2021-12-19 LAB
CODEINE UR QL: NEGATIVE
HYDROCODONE UR CFM-MCNC: 422 NG/ML
HYDROCODONE UR QL: POSITIVE
HYDROMORPHONE UR QL: NEGATIVE
LABORATORY COMMENT REPORT: ABNORMAL
MORPHINE UR QL: NEGATIVE
OPIATES UR QL SCN: POSITIVE NG/ML

## 2021-12-27 DIAGNOSIS — G89.29 CHRONIC BILATERAL LOW BACK PAIN WITH BILATERAL SCIATICA: ICD-10-CM

## 2021-12-27 DIAGNOSIS — M54.42 CHRONIC BILATERAL LOW BACK PAIN WITH BILATERAL SCIATICA: ICD-10-CM

## 2021-12-27 DIAGNOSIS — M54.41 CHRONIC BILATERAL LOW BACK PAIN WITH BILATERAL SCIATICA: ICD-10-CM

## 2021-12-27 RX ORDER — HYDROCODONE BITARTRATE AND ACETAMINOPHEN 7.5; 325 MG/1; MG/1
1 TABLET ORAL 3 TIMES DAILY PRN
Qty: 75 TABLET | Refills: 0 | Status: SHIPPED | OUTPATIENT
Start: 2021-12-27 | End: 2022-01-24 | Stop reason: SDUPTHER

## 2021-12-27 NOTE — TELEPHONE ENCOUNTER
Caller: Meliza Arreola    Relationship: Self    Best call back number: [phone]    Requested Prescriptions:   HYDROcodone-acetaminophen (NORCO) 7.5-325 MG per tablet    Pharmacy where request should be sent:    [facility] [address] [place] [address] [place] [address] [address] [facility] - [phone]  - [phone]   [phone]    Additional details provided by patient: PATIENT WILL BE OUT OF THIS MEDICATION AFTER TOMORROW.     Does the patient have less than a 3 day supply:  [x] Yes  [] No    Srinivasan Sandhu Rep   12/27/21 10:17 EST

## 2022-01-10 RX ORDER — ATORVASTATIN CALCIUM 10 MG/1
TABLET, FILM COATED ORAL
Qty: 90 TABLET | Refills: 1 | Status: SHIPPED | OUTPATIENT
Start: 2022-01-10 | End: 2022-07-08 | Stop reason: SDUPTHER

## 2022-01-24 DIAGNOSIS — M54.42 CHRONIC BILATERAL LOW BACK PAIN WITH BILATERAL SCIATICA: ICD-10-CM

## 2022-01-24 DIAGNOSIS — M54.41 CHRONIC BILATERAL LOW BACK PAIN WITH BILATERAL SCIATICA: ICD-10-CM

## 2022-01-24 DIAGNOSIS — G89.29 CHRONIC BILATERAL LOW BACK PAIN WITH BILATERAL SCIATICA: ICD-10-CM

## 2022-01-24 NOTE — TELEPHONE ENCOUNTER
Caller: Meliza Arreola    Relationship: Self    Best call back number: [phone]    Requested Prescriptions:   Requested Prescriptions     Pending Prescriptions Disp Refills   • HYDROcodone-acetaminophen (NORCO) 7.5-325 MG per tablet 75 tablet 0     Sig: Take 1 tablet by mouth 3 (Three) Times a Day As Needed for Moderate Pain . for pain        Pharmacy where request should be sent: [place] [address] [place] [address] [facility] [phone] [facility] [phone] FX     Additional details provided by patient:     Does the patient have less than a 3 day supply:  [x] Yes  [] No    Srinivasan Bolden Rep   01/24/22 11:18 EST

## 2022-01-25 RX ORDER — HYDROCODONE BITARTRATE AND ACETAMINOPHEN 7.5; 325 MG/1; MG/1
1 TABLET ORAL 3 TIMES DAILY PRN
Qty: 75 TABLET | Refills: 0 | Status: SHIPPED | OUTPATIENT
Start: 2022-01-25 | End: 2022-02-21 | Stop reason: SDUPTHER

## 2022-01-25 NOTE — TELEPHONE ENCOUNTER
Caller: Meliza Arreola    Relationship: Self    Best call back number: [phone]    Requested Prescriptions:   Requested Prescriptions     Pending Prescriptions Disp Refills   • Anoro Ellipta 62.5-25 MCG/INH aerosol powder  inhaler        Pharmacy where request should be sent: [facility] [address] [place], [place] [address] [place] [address] [address] [facility] - [phone]  - [phone] FX     Additional details provided by patient: PATIENT STATED: THAT SHE NEEDS 3 REFILLS OF THIS MEDICATION, SHE PICKED UP TODAY AND PAID SAME PRICE FOR 1 MONTH AS SHE WOULD HAVE FOR 3 MONTHS     Does the patient have less than a 3 day supply:  [] Yes  [x] No    Srinivasan BARBOUR Rep   01/25/22 14:38 EST

## 2022-02-02 ENCOUNTER — TELEPHONE (OUTPATIENT)
Dept: PULMONOLOGY | Facility: CLINIC | Age: 62
End: 2022-02-02

## 2022-02-02 DIAGNOSIS — E11.42 TYPE 2 DIABETES MELLITUS WITH DIABETIC POLYNEUROPATHY, WITHOUT LONG-TERM CURRENT USE OF INSULIN: ICD-10-CM

## 2022-02-02 DIAGNOSIS — R51.9 NONINTRACTABLE EPISODIC HEADACHE, UNSPECIFIED HEADACHE TYPE: ICD-10-CM

## 2022-02-02 DIAGNOSIS — F51.01 PRIMARY INSOMNIA: ICD-10-CM

## 2022-02-03 RX ORDER — AMITRIPTYLINE HYDROCHLORIDE 10 MG/1
TABLET, FILM COATED ORAL
Qty: 90 TABLET | Refills: 1 | Status: SHIPPED | OUTPATIENT
Start: 2022-02-03 | End: 2022-04-11 | Stop reason: SDUPTHER

## 2022-02-03 NOTE — TELEPHONE ENCOUNTER
Patient states she called back yesterday and spoke with someone and they are taking care of this. Patient stated she doesn't remember who she talked to. States she wants a 3 month supply due to cost. States she pays the same price for 1 and she does for 3

## 2022-02-18 ENCOUNTER — TELEPHONE (OUTPATIENT)
Dept: FAMILY MEDICINE CLINIC | Age: 62
End: 2022-02-18

## 2022-02-18 DIAGNOSIS — Z12.31 ENCOUNTER FOR SCREENING MAMMOGRAM FOR BREAST CANCER: Primary | ICD-10-CM

## 2022-02-18 NOTE — TELEPHONE ENCOUNTER
Caller: Meliza Arreola    Relationship: Self    Best call back number: [phone]    What is the medical concern/diagnosis:     What specialty or service is being requested: MAMMOGRAM        Any additional details:    PATIENT IS REQUESTING A REFERRAL FOR A MAMMOGRAM.  HER LAST APPOINTMENT WAS 3/21.  SHE IS REQUESTING THIS TO BE DONE ASAP.     PATIENT IS REQUESTING A CALL ONCE THIS IS DONE

## 2022-02-21 DIAGNOSIS — M54.42 CHRONIC BILATERAL LOW BACK PAIN WITH BILATERAL SCIATICA: ICD-10-CM

## 2022-02-21 DIAGNOSIS — M54.41 CHRONIC BILATERAL LOW BACK PAIN WITH BILATERAL SCIATICA: ICD-10-CM

## 2022-02-21 DIAGNOSIS — G89.29 CHRONIC BILATERAL LOW BACK PAIN WITH BILATERAL SCIATICA: ICD-10-CM

## 2022-02-21 NOTE — TELEPHONE ENCOUNTER
Caller: Meliza Arreola    Relationship: Self    Best call back number: [phone]    Requested Prescriptions:   Requested Prescriptions     Pending Prescriptions Disp Refills   • HYDROcodone-acetaminophen (NORCO) 7.5-325 MG per tablet 75 tablet 0     Sig: Take 1 tablet by mouth 3 (Three) Times a Day As Needed for Moderate Pain . for pain        Pharmacy where request should be sent: [place] [address] [place] [address] [facility] [phone] [facility] [phone] FX     Does the patient have less than a 3 day supply:  [x] Yes  [] No    Srinivasan Blakely Rep   02/21/22 09:32 EST

## 2022-02-22 RX ORDER — HYDROCODONE BITARTRATE AND ACETAMINOPHEN 7.5; 325 MG/1; MG/1
1 TABLET ORAL 3 TIMES DAILY PRN
Qty: 75 TABLET | Refills: 0 | Status: SHIPPED | OUTPATIENT
Start: 2022-02-22 | End: 2022-03-18 | Stop reason: SDUPTHER

## 2022-02-23 RX ORDER — HYDROXYZINE 50 MG/1
50 TABLET, FILM COATED ORAL EVERY 8 HOURS PRN
Qty: 90 TABLET | Refills: 2 | Status: SHIPPED | OUTPATIENT
Start: 2022-02-23 | End: 2022-04-14 | Stop reason: SDUPTHER

## 2022-02-23 NOTE — TELEPHONE ENCOUNTER
Caller: Meliza Arreola    Relationship: Self    Best call back number: [phone]    Requested Prescriptions:   Requested Prescriptions     Pending Prescriptions Disp Refills   • hydrOXYzine (ATARAX) 50 MG tablet          Pharmacy where request should be sent: [facility] [address] [place] [address] [place] [address] [address] [facility] [phone]  - [phone] FX     Additional details provided by patient:    PATIENT IS REQUESTING PCP TO REFILL THIS MEDICATION. SHE IS ALSO REQUESTING FOR A 90 DAYS SUPPLY     Does the patient have less than a 3 day supply:  [x] Yes  [] No    Srinivasan Ledezma Rep   02/23/22 12:14 EST

## 2022-03-01 RX ORDER — CANAGLIFLOZIN 100 MG/1
TABLET, FILM COATED ORAL
Qty: 90 TABLET | Refills: 1 | Status: SHIPPED | OUTPATIENT
Start: 2022-03-01 | End: 2022-08-25

## 2022-03-11 DIAGNOSIS — E11.42 DIABETIC PERIPHERAL NEUROPATHY: ICD-10-CM

## 2022-03-15 RX ORDER — PREGABALIN 150 MG/1
CAPSULE ORAL
Qty: 60 CAPSULE | Refills: 0 | Status: SHIPPED | OUTPATIENT
Start: 2022-03-15 | End: 2022-04-12

## 2022-03-18 DIAGNOSIS — G89.29 CHRONIC BILATERAL LOW BACK PAIN WITH BILATERAL SCIATICA: ICD-10-CM

## 2022-03-18 DIAGNOSIS — M54.42 CHRONIC BILATERAL LOW BACK PAIN WITH BILATERAL SCIATICA: ICD-10-CM

## 2022-03-18 DIAGNOSIS — M54.41 CHRONIC BILATERAL LOW BACK PAIN WITH BILATERAL SCIATICA: ICD-10-CM

## 2022-03-18 NOTE — TELEPHONE ENCOUNTER
Caller: Meliza Arreola    Relationship: Self    Best call back number: [phone]    Requested Prescriptions:   Requested Prescriptions     Pending Prescriptions Disp Refills   • HYDROcodone-acetaminophen (NORCO) 7.5-325 MG per tablet 75 tablet 0     Sig: Take 1 tablet by mouth 3 (Three) Times a Day As Needed for Moderate Pain . for pain        Pharmacy where request should be sent: [facility] [address] [place] [address] [place] [address] [address] [facility] [phone] [facility] [phone] FX     Additional details provided by patient: PATIENT HAS LESS THAN A 3 DAY SUPPLY    Does the patient have less than a 3 day supply:  [x] Yes  [] No    Srinivasan Rasmussen Rep   03/18/22 08:36 EDT

## 2022-03-21 RX ORDER — HYDROCODONE BITARTRATE AND ACETAMINOPHEN 7.5; 325 MG/1; MG/1
1 TABLET ORAL 3 TIMES DAILY PRN
Qty: 75 TABLET | Refills: 0 | Status: SHIPPED | OUTPATIENT
Start: 2022-03-21 | End: 2022-04-21 | Stop reason: SDUPTHER

## 2022-04-05 ENCOUNTER — HOSPITAL ENCOUNTER (OUTPATIENT)
Dept: MAMMOGRAPHY | Facility: HOSPITAL | Age: 62
Discharge: HOME | End: 2022-04-05
Admitting: FAMILY MEDICINE

## 2022-04-05 DIAGNOSIS — Z12.31 ENCOUNTER FOR SCREENING MAMMOGRAM FOR BREAST CANCER: ICD-10-CM

## 2022-04-05 PROCEDURE — 77063 BREAST TOMOSYNTHESIS BI: CPT

## 2022-04-05 PROCEDURE — 77067 SCR MAMMO BI INCL CAD: CPT

## 2022-04-09 DIAGNOSIS — E11.42 DIABETIC PERIPHERAL NEUROPATHY: ICD-10-CM

## 2022-04-11 ENCOUNTER — OFFICE VISIT (OUTPATIENT)
Dept: PULMONOLOGY | Facility: CLINIC | Age: 62
End: 2022-04-11

## 2022-04-11 VITALS
HEIGHT: 64 IN | DIASTOLIC BLOOD PRESSURE: 43 MMHG | SYSTOLIC BLOOD PRESSURE: 102 MMHG | RESPIRATION RATE: 16 BRPM | HEART RATE: 62 BPM | OXYGEN SATURATION: 97 % | WEIGHT: 196.7 LBS | TEMPERATURE: 98 F | BODY MASS INDEX: 33.58 KG/M2

## 2022-04-11 DIAGNOSIS — G47.33 OSA TREATED WITH BIPAP: ICD-10-CM

## 2022-04-11 DIAGNOSIS — E66.9 OBESITY (BMI 30-39.9): ICD-10-CM

## 2022-04-11 DIAGNOSIS — F17.201 TOBACCO ABUSE, IN REMISSION: ICD-10-CM

## 2022-04-11 DIAGNOSIS — J43.2 CENTRILOBULAR EMPHYSEMA: Primary | ICD-10-CM

## 2022-04-11 PROCEDURE — 99213 OFFICE O/P EST LOW 20 MIN: CPT | Performed by: INTERNAL MEDICINE

## 2022-04-11 RX ORDER — AMITRIPTYLINE HYDROCHLORIDE 10 MG/1
TABLET, FILM COATED ORAL
COMMUNITY
End: 2022-08-02

## 2022-04-11 NOTE — PROGRESS NOTES
Primary Care Provider  Ne Palmer MD   Referring Provider  No ref. provider found      Patient Complaint  COPD, Sleep Apnea, Results (LDCT results), Cough (Dry cough), and Wheezing      Subjective          Meliza Arreola presents to [facility]      History of Presenting Illness  Meliza Arreola is a 61 y.o. female here for follow-up for COPD and sleep apnea.  She is a former cigarette smoker.  She wears a CPAP machine nightly with naps without issues.  She did have an A. fib ablation done in  and has not had A. fib issues in over a year.  She is doing well otherwise.  She takes Anoro daily.  She has not needed albuterol in months.  Her dyspnea is at baseline.  She is short of breath walking about 2000 feet or up 3 flights of steps.  Her dyspnea is mild in severity, worse with activity and relieved with rest.  She denies coughing, wheezing, headaches, chest pain, weight loss or hemoptysis. Denies fevers, chills and night sweats.  She is able to perform ADLs without difficulties and denies any swollen glands/lymph nodes in the head or neck.    I have personally reviewed the review of systems, past family, social, medical and surgical histories; and agree with their findings.      Review of Systems  Constitutional symptoms:  Denied complaints   Ear, nose, throat: Denied complaints  Cardiovascular:  Denied complaints  Respiratory: Dyspnea, otherwise denied complaints  Gastrointestinal: Denied complaints  Musculoskeletal: Denied complaints        Family History   Problem Relation Age of Onset   • Coronary artery disease Mother    • Coronary artery disease Father         Social History     Socioeconomic History   • Marital status:    • Number of children: 2   Tobacco Use   • Smoking status: Former Smoker     Types: Cigarettes     Quit date:      Years since quittin.2   • Smokeless tobacco: Never Used   Vaping Use   • Vaping Use:  Never used   Substance and Sexual Activity   • Alcohol use: Defer   • Drug use: Defer   • Sexual activity: Defer        Past Medical History:   Diagnosis Date   • Abnormal results of liver function studies    • Actinic keratosis    • Allergy     METOPROLOL SUCCINATE, ADVERSE REACTION   MODERATE   • Anxiety    • Chronic pain syndrome    • COPD (chronic obstructive pulmonary disease) (HCC)    • Depression    • Dizziness and giddiness    • GERD (gastroesophageal reflux disease)    • HLD (hyperlipidemia)    • HTN (hypertension)    • Long term (current) use of opiate analgesic    • Low back pain    • Methicillin resistant Staphylococcus aureus infection, unspecified site    • Muscle weakness (generalized)    • Nausea    • Obstructive sleep apnea (adult) (pediatric)    • Other amnesia    • Pain in left knee    • Presence of unspecified artificial knee joint    • Presence of unspecified artificial knee joint    • PTSD (post-traumatic stress disorder)    • Rosacea, unspecified    • Sleep apnea    • Squamous cell carcinoma of skin of unspecified upper limb, including shoulder    • Type 2 diabetes mellitus with diabetic polyneuropathy ([facility])    • Unspecified atrial fibrillation ([facility])    • Unspecified chronic gastritis without bleeding         Immunization History   Administered Date(s) Administered   • Flu Vaccine Quad PF >36MO 11/17/2014, 11/01/2016   • Influenza, Unspecified 11/07/2019   • Pneumococcal Conjugate 13-Valent (PCV13) 11/25/2019   • Pneumococcal Polysaccharide (PPSV23) 09/18/2014         Allergies   Allergen Reactions   • Methylprednisolone Itching          Current Outpatient Medications:   •  albuterol (PROVENTIL) (2.5 MG/3ML) 0.083% nebulizer solution, USE 1 VIAL IN NEBULIZER EVERY 6 HOURS AS NEEDED FOR SHORTNESS OF AIR/WHEEZING, Disp: 180 mL, Rfl: 0  •  albuterol sulfate  (90 Base) MCG/ACT inhaler, Inhale 2 puffs by mouth twice daily, Disp: 18 g, Rfl: 0  •  amitriptyline (ELAVIL) 10 MG tablet, Take  by  mouth., Disp: , Rfl:   •  Anoro Ellipta 62.5-25 MCG/INH aerosol powder  inhaler, , Disp: , Rfl:   •  atorvastatin (LIPITOR) 10 MG tablet, Take 1 tablet by mouth once daily, Disp: 90 tablet, Rfl: 1  •  Cartia  MG 24 hr capsule, Take 120 mg by mouth Daily., Disp: , Rfl:   •  clopidogrel (PLAVIX) 75 MG tablet, Take 75 mg by mouth Daily., Disp: , Rfl:   •  glipizide (GLUCOTROL XL) 5 MG ER tablet, Take 1 tablet by mouth twice daily, Disp: 180 tablet, Rfl: 1  •  hydroCHLOROthiazide (MICROZIDE) 12.5 MG capsule, hydrochlorothiazide 12.5 mg oral capsule take 1 capsule (12.5 mg) by oral route once daily   Active, Disp: , Rfl:   •  HYDROcodone-acetaminophen (NORCO) 7.5-325 MG per tablet, Take 1 tablet by mouth 3 (Three) Times a Day As Needed for Moderate Pain . for pain, Disp: 75 tablet, Rfl: 0  •  hydrOXYzine (ATARAX) 50 MG tablet, Take 1 tablet by mouth Every 8 (Eight) Hours As Needed for Itching., Disp: 90 tablet, Rfl: 2  •  Invokana 100 MG tablet tablet, Take 1 tablet by mouth once daily, Disp: 90 tablet, Rfl: 1  •  losartan (COZAAR) 25 MG tablet, Take 25 mg by mouth Daily., Disp: , Rfl:   •  metoprolol succinate XL (TOPROL-XL) 50 MG 24 hr tablet, Take 50 mg by mouth Daily., Disp: , Rfl:   •  metroNIDAZOLE (METROGEL) 0.75 % gel, Apply  topically to the appropriate area as directed Daily., Disp: 45 g, Rfl: 0  •  omeprazole (priLOSEC) 40 MG capsule, Take 1 capsule by mouth once daily, Disp: 90 capsule, Rfl: 1  •  ondansetron ODT (Zofran ODT) 4 MG disintegrating tablet, Place 1 tablet on the tongue Every 8 (Eight) Hours As Needed for Nausea or Vomiting., Disp: 20 tablet, Rfl: 0  •  OneTouch Ultra test strip, USE 1 STRIP TO CHECK GLUCOSE TWICE DAILY, Disp: 100 each, Rfl: 11  •  Pacerone 200 MG tablet, Take 200 mg by mouth 2 (Two) Times a Day., Disp: , Rfl:   •  pregabalin (LYRICA) 150 MG capsule, Take 1 capsule by mouth twice daily, Disp: 60 capsule, Rfl: 0  •  benzonatate (Tessalon Perles) 100 MG capsule, Take 1 capsule  "by mouth 3 (Three) Times a Day As Needed for Cough., Disp: 30 capsule, Rfl: 0         Objective     Vital Signs:   /43 (BP Location: Left arm, Patient Position: Sitting, Cuff Size: Large Adult)   Pulse 62   Temp 98 °F (36.7 °C) (Tympanic)   Resp 16   Ht 162.6 cm (64\")   Wt 89.2 kg (196 lb 11.2 oz)   SpO2 97% Comment: room air  BMI 33.76 kg/m²     Physical Exam  Vital Signs Reviewed   WDWN, Alert, NAD.    HEENT:  PERRL, EOMI.  OP, nares clear  Neck:  Supple, no JVD, no thyromegaly  Chest:  good aeration, clear to auscultation bilaterally, tympanic to percussion bilaterally, no work of breathing noted  CV: RRR, no MGR, pulses 2+, equal.  Abd:  Soft, NT, ND, + BS, no HSM, obese  EXT:  no clubbing, no cyanosis, no edema  Neuro:  A&Ox3, CN grossly intact, no focal deficits.  Skin: No rashes or lesions noted       Result Review :   I have personally reviewed our last office notes from 2021.  Also personally reviewed 2021 LDCT screen for lung cancer screening show no new nodules or masses.  Also personally reviewed her cardiologist note showing adequate A. fib control.  Last set of labs showed no peripheral eosinophilia and no evidence of chronic hypercapnic respiratory failure.             Assessment and Plan      Patient Active Problem List   Diagnosis   • Type 2 diabetes mellitus with diabetic polyneuropathy, without long-term current use of insulin (HCC)   • Mixed hyperlipidemia   • Essential hypertension   • Gastroesophageal reflux disease without esophagitis   • Longstanding persistent atrial fibrillation (HCC)   • Sick sinus syndrome ([facility])   • Major depressive disorder, recurrent episode, moderate degree ([facility])   • PTSD (post-traumatic stress disorder)   • Obstructive sleep apnea   • Chronic obstructive pulmonary disease (COPD) ([facility])   • Chronic bilateral low back pain with bilateral sciatica   • Rosacea   • Chronic pain syndrome   • High risk medication use   • Nonintractable episodic headache   • " Primary insomnia   • History of COVID-19         Impression:  Asthma/COPD overlap syndrome.  Well-controlled with Anoro and at baseline.  COPD assessment test score is 2 signifying great disease control  Atrial fibrillation status post ablation now currently resolved.  Followed up by cardiology in [place]  Chronic dyspnea, minimal and at baseline  Tobacco use of cigarettes in remission, enrolled in LDCT screening  Obstructive sleep apnea well-controlled with BiPAP per Dr. Martinez  Obesity BMI 33.7    Plan:  Continue Anoro with albuterol as needed  Continue BiPAP nightly and with naps per her sleep specialist  A. fib resolved since ablation.  Intermittently follow-up with her cardiologist in [place]  Continue annual LDCT screening for lung cancer screening  Diet and exercise counseling provided today.  Recommended 30 minutes daily exercise well is an 1800 -calorie/day diet.  Patient verbalized understanding.  Total time counseling the patient today was 4 minutes  Vaccination status: Declines any and all vaccinations for flu, pneumonia and COVID-19  Medications personally reviewed    Follow Up   Return in about 1 year (around 4/11/2023).  Patient was given instructions and counseling regarding her condition or for health maintenance advice. Please see specific information pulled into the AVS if appropriate.     Electronically signed by Flynn Mcfarlane MD, 04/11/22, 10:50 AM EDT.

## 2022-04-12 RX ORDER — PREGABALIN 150 MG/1
CAPSULE ORAL
Qty: 60 CAPSULE | Refills: 2 | Status: SHIPPED | OUTPATIENT
Start: 2022-04-12 | End: 2022-07-14

## 2022-04-14 ENCOUNTER — TELEPHONE (OUTPATIENT)
Dept: FAMILY MEDICINE CLINIC | Age: 62
End: 2022-04-14

## 2022-04-14 RX ORDER — HYDROXYZINE 50 MG/1
50 TABLET, FILM COATED ORAL EVERY 8 HOURS PRN
Qty: 90 TABLET | Refills: 2 | Status: CANCELLED | OUTPATIENT
Start: 2022-04-14

## 2022-04-14 RX ORDER — HYDROXYZINE 50 MG/1
50 TABLET, FILM COATED ORAL EVERY 8 HOURS PRN
Qty: 90 TABLET | Refills: 2 | Status: SHIPPED | OUTPATIENT
Start: 2022-04-14

## 2022-04-14 NOTE — TELEPHONE ENCOUNTER
Caller: Meliza Arreola    Relationship to patient: Self    Best call back number: [phone]    Patient is needing: PATIENT IS UNSURE WHY HER MEDICATION hydrOXYzine (ATARAX) 50 MG tablet STATES ITS FOR ITCHING. PATIENT IS NOT PRESCRIBED ANY MEDICATION FOR ITCHING HER HYDROXYZINE MEDICATION WAS FOR ANXIETY. PATIENT WOULD LIKE THIS FIXED.

## 2022-04-15 NOTE — TELEPHONE ENCOUNTER
Apologies for the error.  Epic defaults the rx to itching when it is ordered, so I must have missed changing the prn.  I have resent the rx for the correct indication.  Thanks, ZIA

## 2022-04-19 ENCOUNTER — TELEPHONE (OUTPATIENT)
Dept: FAMILY MEDICINE CLINIC | Age: 62
End: 2022-04-19

## 2022-04-19 DIAGNOSIS — M54.42 CHRONIC BILATERAL LOW BACK PAIN WITH BILATERAL SCIATICA: ICD-10-CM

## 2022-04-19 DIAGNOSIS — M54.41 CHRONIC BILATERAL LOW BACK PAIN WITH BILATERAL SCIATICA: ICD-10-CM

## 2022-04-19 DIAGNOSIS — G89.29 CHRONIC BILATERAL LOW BACK PAIN WITH BILATERAL SCIATICA: ICD-10-CM

## 2022-04-19 RX ORDER — HYDROCODONE BITARTRATE AND ACETAMINOPHEN 7.5; 325 MG/1; MG/1
1 TABLET ORAL 3 TIMES DAILY PRN
Qty: 75 TABLET | Refills: 0 | Status: CANCELLED | OUTPATIENT
Start: 2022-04-19

## 2022-04-19 NOTE — TELEPHONE ENCOUNTER
OK to give her a same day spot ASAP.  I really need to see her in the office before sending in another month's worth of meds.  Please send back to me once she has scheduled and I will send in enough meds to get her to that appt.  Thanks, ZIA

## 2022-04-19 NOTE — TELEPHONE ENCOUNTER
Caller: Meliza Arreola    Relationship: Self    Best call back number: [phone]    Requested Prescriptions:   Requested Prescriptions     Pending Prescriptions Disp Refills   • HYDROcodone-acetaminophen (NORCO) 7.5-325 MG per tablet 75 tablet 0     Sig: Take 1 tablet by mouth 3 (Three) Times a Day As Needed for Moderate Pain . for pain        Pharmacy where request should be sent: [place] [address] [address] [facility] [phone] [facility] [phone] FX     Does the patient have less than a 3 day supply:  [x] Yes  [] No    Srinivasan Blakely Rep   04/19/22 09:37 EDT

## 2022-04-21 ENCOUNTER — LAB (OUTPATIENT)
Dept: LAB | Facility: HOSPITAL | Age: 62
End: 2022-04-21

## 2022-04-21 ENCOUNTER — OFFICE VISIT (OUTPATIENT)
Dept: FAMILY MEDICINE CLINIC | Age: 62
End: 2022-04-21

## 2022-04-21 VITALS
DIASTOLIC BLOOD PRESSURE: 56 MMHG | HEART RATE: 60 BPM | WEIGHT: 196 LBS | BODY MASS INDEX: 33.46 KG/M2 | HEIGHT: 64 IN | SYSTOLIC BLOOD PRESSURE: 127 MMHG | TEMPERATURE: 97.6 F

## 2022-04-21 DIAGNOSIS — M54.41 CHRONIC BILATERAL LOW BACK PAIN WITH BILATERAL SCIATICA: ICD-10-CM

## 2022-04-21 DIAGNOSIS — Z79.899 HIGH RISK MEDICATION USE: Primary | ICD-10-CM

## 2022-04-21 DIAGNOSIS — E11.42 TYPE 2 DIABETES MELLITUS WITH DIABETIC POLYNEUROPATHY, WITHOUT LONG-TERM CURRENT USE OF INSULIN: ICD-10-CM

## 2022-04-21 DIAGNOSIS — G89.29 CHRONIC BILATERAL LOW BACK PAIN WITH BILATERAL SCIATICA: ICD-10-CM

## 2022-04-21 DIAGNOSIS — I10 ESSENTIAL HYPERTENSION: ICD-10-CM

## 2022-04-21 DIAGNOSIS — M54.42 CHRONIC BILATERAL LOW BACK PAIN WITH BILATERAL SCIATICA: ICD-10-CM

## 2022-04-21 DIAGNOSIS — F33.1 MAJOR DEPRESSIVE DISORDER, RECURRENT EPISODE, MODERATE DEGREE: ICD-10-CM

## 2022-04-21 DIAGNOSIS — I49.5 SICK SINUS SYNDROME: ICD-10-CM

## 2022-04-21 DIAGNOSIS — I48.11 LONGSTANDING PERSISTENT ATRIAL FIBRILLATION: ICD-10-CM

## 2022-04-21 DIAGNOSIS — E78.2 MIXED HYPERLIPIDEMIA: ICD-10-CM

## 2022-04-21 DIAGNOSIS — Z22.322 MRSA NASAL COLONIZATION: ICD-10-CM

## 2022-04-21 LAB
ALBUMIN SERPL-MCNC: 4.3 G/DL (ref 3.5–5.2)
ALBUMIN UR-MCNC: <1.2 MG/DL
ALBUMIN/GLOB SERPL: 1.6 G/DL
ALP SERPL-CCNC: 107 U/L (ref 39–117)
ALT SERPL W P-5'-P-CCNC: 33 U/L (ref 1–33)
AMPHET+METHAMPHET UR QL: NEGATIVE
AMPHETAMINES UR QL: NEGATIVE
ANION GAP SERPL CALCULATED.3IONS-SCNC: 13.1 MMOL/L (ref 5–15)
AST SERPL-CCNC: 28 U/L (ref 1–32)
BARBITURATES UR QL SCN: NEGATIVE
BENZODIAZ UR QL SCN: NEGATIVE
BILIRUB SERPL-MCNC: 0.3 MG/DL (ref 0–1.2)
BUN SERPL-MCNC: 21 MG/DL (ref 6–20)
BUN/CREAT SERPL: 28.8 (ref 7–25)
BUPRENORPHINE SERPL-MCNC: NEGATIVE NG/ML
CALCIUM SPEC-SCNC: 9.8 MG/DL (ref 8.6–10.5)
CANNABINOIDS SERPL QL: NEGATIVE
CHLORIDE SERPL-SCNC: 108 MMOL/L (ref 98–107)
CHOLEST SERPL-MCNC: 161 MG/DL (ref 0–200)
CO2 SERPL-SCNC: 19.9 MMOL/L (ref 22–29)
COCAINE UR QL: NEGATIVE
CREAT SERPL-MCNC: 0.73 MG/DL (ref 0.57–1)
CREAT UR-MCNC: 12.3 MG/DL
EGFRCR SERPLBLD CKD-EPI 2021: 94.9 ML/MIN/1.73
EXPIRATION DATE: NORMAL
GLOBULIN UR ELPH-MCNC: 2.7 GM/DL
GLUCOSE SERPL-MCNC: 99 MG/DL (ref 65–99)
HBA1C MFR BLD: 6.9 % (ref 4.8–5.6)
HDLC SERPL-MCNC: 39 MG/DL (ref 40–60)
LDLC SERPL CALC-MCNC: 93 MG/DL (ref 0–100)
LDLC/HDLC SERPL: 2.27 {RATIO}
Lab: NORMAL
MDMA UR QL SCN: NEGATIVE
METHADONE UR QL SCN: NEGATIVE
MICROALBUMIN/CREAT UR: NORMAL MG/G{CREAT}
OPIATES UR QL: NEGATIVE
OXYCODONE UR QL SCN: NEGATIVE
PCP UR QL SCN: NEGATIVE
POTASSIUM SERPL-SCNC: 4.1 MMOL/L (ref 3.5–5.2)
PROT SERPL-MCNC: 7 G/DL (ref 6–8.5)
SODIUM SERPL-SCNC: 141 MMOL/L (ref 136–145)
TRIGL SERPL-MCNC: 167 MG/DL (ref 0–150)
VLDLC SERPL-MCNC: 29 MG/DL (ref 5–40)

## 2022-04-21 PROCEDURE — 80305 DRUG TEST PRSMV DIR OPT OBS: CPT | Performed by: FAMILY MEDICINE

## 2022-04-21 PROCEDURE — 99214 OFFICE O/P EST MOD 30 MIN: CPT | Performed by: FAMILY MEDICINE

## 2022-04-21 PROCEDURE — 82570 ASSAY OF URINE CREATININE: CPT | Performed by: FAMILY MEDICINE

## 2022-04-21 PROCEDURE — 80061 LIPID PANEL: CPT

## 2022-04-21 PROCEDURE — 83036 HEMOGLOBIN GLYCOSYLATED A1C: CPT

## 2022-04-21 PROCEDURE — 82043 UR ALBUMIN QUANTITATIVE: CPT | Performed by: FAMILY MEDICINE

## 2022-04-21 PROCEDURE — 80053 COMPREHEN METABOLIC PANEL: CPT

## 2022-04-21 PROCEDURE — 36415 COLL VENOUS BLD VENIPUNCTURE: CPT

## 2022-04-21 RX ORDER — HYDROCODONE BITARTRATE AND ACETAMINOPHEN 7.5; 325 MG/1; MG/1
1 TABLET ORAL 3 TIMES DAILY PRN
Qty: 75 TABLET | Refills: 0 | Status: SHIPPED | OUTPATIENT
Start: 2022-04-21 | End: 2022-05-19 | Stop reason: SDUPTHER

## 2022-04-21 NOTE — PROGRESS NOTES
Chief Complaint  Pain (3 month follow up )    Subjective          Meliza Arreola presents to [facility] today for routine f/u of chronic issues.    She is on hydrocodone/APAP 2-3 times daily for chronic pain syndrome, number 75/month.  Her worst pain is in the low back as well as bilateral knee and shoulder pain.    She also has diabetic peripheral neuropathy in the feet for which she is on Lyrica.  She was previously on diclofenac but stopped all NSAIDs when she went on anticoagulation with Eliquis for atrial fib.  S/p PT and epidurals in the past but without significant relief.  She is s/p L TKA.  She follows with Dr. [facility] for diffuse osteoarthritis.  She does follow a home exercise program.      She is on Invokana and glipizide for DM.  She is on an ACEI and statin.  She is up-to-date on foot exam, last done 12/2021.  She is reportedly UTD on eye exam, last done 6/2021.  She is on Lyrica for diabetic peripheral neuropathy.  She denies adverse effects.     She is on metoprolol succinate, diltiazem, losartan, HCTZ and amiodarone for hypertension and atrial fib.  Her blood pressure has been well controlled.  She is on atorvastatin for hyperlipidemia.  She denies myalgias.  She is status post placement of the Watchman Device.  She is now on Plavix and no longer taking anticoagulation S/p pacemaker placement for SSS.       She is on Spiriva and Anoro Ellipta for COPD.  She does have some baseline SOB.  She follows with Dr. Mcfarlane.      She is on amitriptyline, topiramate, clonazepam, Abilify, and hydroxyzine prn for chronic PTSD.  She follows with Dr. [facility] who manages her meds.   She is no longer taking buspirone or clonazepam.    She is on Prilosec for GERD.  She denies heartburn or reflux.       She has DUNCAN and has been very faithful with BiPAP.       Current Outpatient Medications:   •  albuterol (PROVENTIL) (2.5 MG/3ML) 0.083% nebulizer solution, USE  1 VIAL IN NEBULIZER EVERY 6 HOURS AS NEEDED FOR SHORTNESS OF AIR/WHEEZING, Disp: 180 mL, Rfl: 0  •  albuterol sulfate  (90 Base) MCG/ACT inhaler, Inhale 2 puffs by mouth twice daily, Disp: 18 g, Rfl: 0  •  amitriptyline (ELAVIL) 10 MG tablet, Take  by mouth., Disp: , Rfl:   •  Anoro Ellipta 62.5-25 MCG/INH aerosol powder  inhaler, , Disp: , Rfl:   •  atorvastatin (LIPITOR) 10 MG tablet, Take 1 tablet by mouth once daily, Disp: 90 tablet, Rfl: 1  •  benzonatate (Tessalon Perles) 100 MG capsule, Take 1 capsule by mouth 3 (Three) Times a Day As Needed for Cough., Disp: 30 capsule, Rfl: 0  •  Cartia  MG 24 hr capsule, Take 120 mg by mouth Daily., Disp: , Rfl:   •  clopidogrel (PLAVIX) 75 MG tablet, Take 75 mg by mouth Daily., Disp: , Rfl:   •  glipizide (GLUCOTROL XL) 5 MG ER tablet, Take 1 tablet by mouth twice daily, Disp: 180 tablet, Rfl: 1  •  hydroCHLOROthiazide (MICROZIDE) 12.5 MG capsule, hydrochlorothiazide 12.5 mg oral capsule take 1 capsule (12.5 mg) by oral route once daily   Active, Disp: , Rfl:   •  HYDROcodone-acetaminophen (NORCO) 7.5-325 MG per tablet, Take 1 tablet by mouth 3 (Three) Times a Day As Needed for Moderate Pain . for pain, Disp: 75 tablet, Rfl: 0  •  hydrOXYzine (ATARAX) 50 MG tablet, Take 1 tablet by mouth Every 8 (Eight) Hours As Needed for Anxiety., Disp: 90 tablet, Rfl: 2  •  Invokana 100 MG tablet tablet, Take 1 tablet by mouth once daily, Disp: 90 tablet, Rfl: 1  •  losartan (COZAAR) 25 MG tablet, Take 25 mg by mouth Daily., Disp: , Rfl:   •  metoprolol succinate XL (TOPROL-XL) 50 MG 24 hr tablet, Take 50 mg by mouth Daily., Disp: , Rfl:   •  metroNIDAZOLE (METROGEL) 0.75 % gel, Apply  topically to the appropriate area as directed Daily., Disp: 45 g, Rfl: 0  •  omeprazole (priLOSEC) 40 MG capsule, Take 1 capsule by mouth once daily, Disp: 90 capsule, Rfl: 1  •  ondansetron ODT (Zofran ODT) 4 MG disintegrating tablet, Place 1 tablet on the tongue Every 8 (Eight) Hours As  "Needed for Nausea or Vomiting., Disp: 20 tablet, Rfl: 0  •  OneTouch Ultra test strip, USE 1 STRIP TO CHECK GLUCOSE TWICE DAILY, Disp: 100 each, Rfl: 11  •  Pacerone 200 MG tablet, Take 200 mg by mouth 2 (Two) Times a Day., Disp: , Rfl:   •  pregabalin (LYRICA) 150 MG capsule, Take 1 capsule by mouth twice daily, Disp: 60 capsule, Rfl: 2  •  mupirocin (BACTROBAN) 2 % nasal ointment, into the nostril(s) as directed by provider 2 (Two) Times a Day for 14 days., Disp: 30 g, Rfl: 0    Allergies:  Methylprednisolone      Objective   Vital Signs:   /56 (BP Location: Left arm, Patient Position: Sitting)   Pulse 60   Temp 97.6 °F (36.4 °C) (Oral)   Ht 162.6 cm (64\")   Wt 88.9 kg (196 lb)   BMI 33.64 kg/m²     Physical Exam  Vitals reviewed.   Constitutional:       General: She is not in acute distress.     Appearance: Normal appearance. She is well-developed.   HENT:      Head: Normocephalic and atraumatic.      Right Ear: External ear normal.      Left Ear: External ear normal.      Nose: Nose normal.      Mouth/Throat:      Mouth: Mucous membranes are moist.   Eyes:      Extraocular Movements: Extraocular movements intact.      Conjunctiva/sclera: Conjunctivae normal.      Pupils: Pupils are equal, round, and reactive to light.   Cardiovascular:      Rate and Rhythm: Normal rate and regular rhythm.      Pulses:           Dorsalis pedis pulses are 2+ on the right side and 2+ on the left side.      Heart sounds: No murmur heard.  Pulmonary:      Effort: Pulmonary effort is normal.      Breath sounds: Normal breath sounds. No wheezing, rhonchi or rales.   Abdominal:      General: Bowel sounds are normal. There is no distension.      Palpations: Abdomen is soft.      Tenderness: There is no abdominal tenderness.   Musculoskeletal:         General: Normal range of motion.   Feet:      Right foot:      Protective Sensation: 3 sites tested. 1 site sensed.     Skin integrity: Skin integrity normal. No ulcer or blister. "      Toenail Condition: Right toenails are normal.      Left foot:      Protective Sensation: 3 sites tested. 2 sites sensed.      Skin integrity: Skin integrity normal. No ulcer or blister.      Toenail Condition: Left toenails are normal.      Comments: Diabetic Foot Exam Performed     Neurological:      Mental Status: She is alert.   Psychiatric:         Mood and Affect: Affect normal.             Assessment and Plan    Diagnoses and all orders for this visit:    1. High risk medication use (Primary)  -     POC Urine Drug Screen Premier Bio-Cup    2. Chronic bilateral low back pain with bilateral sciatica  Assessment & Plan:  Stable on current regimen.  Symptoms are well controlled.  No adverse effects. She does require ongoing use of this controlled substance to function.  Tox screen was due today.  Prior tox screen appropriate.  Huber was run today.  Refills were needed today.  RTC 3 months.      Orders:  -     HYDROcodone-acetaminophen (NORCO) 7.5-325 MG per tablet; Take 1 tablet by mouth 3 (Three) Times a Day As Needed for Moderate Pain . for pain  Dispense: 75 tablet; Refill: 0    3. Type 2 diabetes mellitus with diabetic polyneuropathy, without long-term current use of insulin (HCC)  Assessment & Plan:  She is on Invokana and glipizide for DM.  No refills needed. She is on an ACEI and statin.  She is up-to-date on foot exam, last done 12/2021.  She is reportedly UTD on eye exam, last done 6/2021 by Dr. Ma in [place]; records requested.  She is on Lyrica for diabetic peripheral neuropathy.  She denies adverse effects.     Orders:  -     Comprehensive Metabolic Panel; Future  -     Lipid Panel; Future  -     Hemoglobin A1c; Future  -     Microalbumin / Creatinine Urine Ratio - Urine, Clean Catch; Future  -     Microalbumin / Creatinine Urine Ratio - Urine, Clean Catch    4. Essential hypertension  Assessment & Plan:  Stable on metoprolol succinate, diltiazem, losartan, and HCTZ.  No refills  needed.  Checking labs.      5. Mixed hyperlipidemia  Assessment & Plan:  Stable on atorvastatin 10 mg daily.  No refills needed.  Checking labs.      6. Longstanding persistent atrial fibrillation (HCC)  Assessment & Plan:  Stable.  Since status post Watchman Device.  Following with Cardiology.  No refills needed.  Checking labs.      7. Sick sinus syndrome (HCC)  Assessment & Plan:  Status post pacemaker.  Following with Cardiology.      8. Major depressive disorder, recurrent episode, moderate degree (HCC)  Assessment & Plan:  Following with Psychiatry.  They are managing her medications.      9. MRSA nasal colonization  -     mupirocin (BACTROBAN) 2 % nasal ointment; into the nostril(s) as directed by provider 2 (Two) Times a Day for 14 days.  Dispense: 30 g; Refill: 0      Follow Up   Return in about 3 months (around 7/21/2022) for Medicare Wellness (FIRST WEEK OF JULY OKAY).  Patient was given instructions and counseling regarding her condition or for health maintenance advice. Please see specific information pulled into the AVS if appropriate.

## 2022-04-21 NOTE — ASSESSMENT & PLAN NOTE
Stable on current regimen.  Symptoms are well controlled.  No adverse effects. She does require ongoing use of this controlled substance to function.  Tox screen was due today.  Prior tox screen appropriate.  Huber was run today.  Refills were needed today.  RTC 3 months.

## 2022-04-21 NOTE — ASSESSMENT & PLAN NOTE
She is on Invokana and glipizide for DM.  No refills needed. She is on an ACEI and statin.  She is up-to-date on foot exam, last done 12/2021.  She is reportedly UTD on eye exam, last done 6/2021 by Dr. Ma in [place]; records requested.  She is on Lyrica for diabetic peripheral neuropathy.  She denies adverse effects.

## 2022-04-21 NOTE — ASSESSMENT & PLAN NOTE
Stable on metoprolol succinate, diltiazem, losartan, and HCTZ.  No refills needed.  Checking labs.

## 2022-04-21 NOTE — ASSESSMENT & PLAN NOTE
Stable.  Since status post Watchman Device.  Following with Cardiology.  No refills needed.  Checking labs.

## 2022-05-11 ENCOUNTER — TELEPHONE (OUTPATIENT)
Dept: FAMILY MEDICINE CLINIC | Age: 62
End: 2022-05-11

## 2022-05-11 DIAGNOSIS — E11.42 DIABETIC PERIPHERAL NEUROPATHY: ICD-10-CM

## 2022-05-11 RX ORDER — PREGABALIN 150 MG/1
150 CAPSULE ORAL 2 TIMES DAILY
Qty: 60 CAPSULE | Refills: 2 | Status: CANCELLED | OUTPATIENT
Start: 2022-05-11

## 2022-05-11 RX ORDER — GLIPIZIDE 5 MG/1
TABLET, FILM COATED, EXTENDED RELEASE ORAL
Qty: 180 TABLET | Refills: 0 | Status: SHIPPED | OUTPATIENT
Start: 2022-05-11 | End: 2022-08-09

## 2022-05-11 NOTE — TELEPHONE ENCOUNTER
Caller: Meliza Arreola    Relationship: Self    Best call back number: [phone]    Requested Prescriptions:   Requested Prescriptions     Pending Prescriptions Disp Refills   • pregabalin (LYRICA) 150 MG capsule 60 capsule 2     Sig: Take 1 capsule by mouth 2 (Two) Times a Day.        Pharmacy where request should be sent: [facility] [address] [place] [address] [address] [place] [facility] [phone] [facility] [phone] FX     Additional details provided by patient: PATIENT WOULD LIKE A 3 MONTH SUPPLY OF THIS MEDICATION.     Does the patient have less than a 3 day supply:  [] Yes  [x] No    Srinivasan Blakely Rep   05/11/22 09:42 EDT

## 2022-05-19 DIAGNOSIS — M54.42 CHRONIC BILATERAL LOW BACK PAIN WITH BILATERAL SCIATICA: ICD-10-CM

## 2022-05-19 DIAGNOSIS — M54.41 CHRONIC BILATERAL LOW BACK PAIN WITH BILATERAL SCIATICA: ICD-10-CM

## 2022-05-19 DIAGNOSIS — G89.29 CHRONIC BILATERAL LOW BACK PAIN WITH BILATERAL SCIATICA: ICD-10-CM

## 2022-05-19 RX ORDER — HYDROCODONE BITARTRATE AND ACETAMINOPHEN 7.5; 325 MG/1; MG/1
1 TABLET ORAL 3 TIMES DAILY PRN
Qty: 75 TABLET | Refills: 0 | Status: SHIPPED | OUTPATIENT
Start: 2022-05-19 | End: 2022-06-16 | Stop reason: SDUPTHER

## 2022-05-19 NOTE — TELEPHONE ENCOUNTER
Caller: Meliza Arreola    Relationship: Self    Best call back number: [phone]  Requested Prescriptions:   Requested Prescriptions     Pending Prescriptions Disp Refills   • HYDROcodone-acetaminophen (NORCO) 7.5-325 MG per tablet 75 tablet 0     Sig: Take 1 tablet by mouth 3 (Three) Times a Day As Needed for Moderate Pain . for pain        Pharmacy where request should be sent: [place] [address] [address] [facility] [phone] [facility] [phone] FX     Additional details provided by patient:     Does the patient have less than a 3 day supply:  [x] Yes  [] No    Srinivasan Panchal Rep   05/19/22 13:53 EDT

## 2022-05-31 ENCOUNTER — TELEPHONE (OUTPATIENT)
Dept: PULMONOLOGY | Facility: CLINIC | Age: 62
End: 2022-05-31

## 2022-05-31 ENCOUNTER — TELEPHONE (OUTPATIENT)
Dept: FAMILY MEDICINE CLINIC | Age: 62
End: 2022-05-31

## 2022-05-31 DIAGNOSIS — L71.9 ROSACEA: ICD-10-CM

## 2022-05-31 RX ORDER — METRONIDAZOLE 7.5 MG/G
GEL TOPICAL DAILY
Qty: 45 G | Refills: 0 | Status: SHIPPED | OUTPATIENT
Start: 2022-05-31 | End: 2023-07-05

## 2022-05-31 NOTE — TELEPHONE ENCOUNTER
Patient called requesting refill of Anoro to be called to [facility] for 3 month supply.   Thank you.

## 2022-05-31 NOTE — TELEPHONE ENCOUNTER
Spoke with pt and she would like a refill on the Metro Gel and she will try that again and if it does not help she will call back for the referral.

## 2022-05-31 NOTE — TELEPHONE ENCOUNTER
It looks like I prescribed MetroGel for her previously.  This is really the only thing I use for rosacea.  If she is having problems despite the use of the MetroGel, I will be happy to get her in with a dermatologist for further treatment options.  Would she like to see Dr. Quispe in [place]?  Thanks, [email or web address]

## 2022-05-31 NOTE — TELEPHONE ENCOUNTER
Caller: Meliza Arreola    Relationship: Self    Best call back number: [phone]    What medication are you requesting:  SOMETHING FOR ROSACEA    What are your current symptoms: FACE IS RED, PUFFY AND BLOTCHY, PIMPLES, ITCHING , SLIGHT SUN BURN    How long have you been experiencing symptoms: ALMOST 6 MONTHS BUT THE SYMPTOMS TODAY ARE REALLY BAD AND IT STARTED YESTERDAY, MAY 30, 2022    Have you had these symptoms before:    [x] Yes  [] No    Have you been treated for these symptoms before:   [x] Yes  [] No    If a prescription is needed, what is your preferred pharmacy and phone number:    [facility] [address], [place] - [address] [facility] [phone] [facility] [phone]       Additional notes:PATIENT STATES THAT PCP DID PRESCRIBE SOMETHING PREVIOUSLY AND IT DID NOT WORK. THEREFORE, SHE IS REQUESTING SOMETHING ELSE IF POSSIBLE.

## 2022-06-13 RX ORDER — OMEPRAZOLE 40 MG/1
CAPSULE, DELAYED RELEASE ORAL
Qty: 90 CAPSULE | Refills: 0 | Status: SHIPPED | OUTPATIENT
Start: 2022-06-13 | End: 2022-09-07

## 2022-06-16 DIAGNOSIS — M54.42 CHRONIC BILATERAL LOW BACK PAIN WITH BILATERAL SCIATICA: ICD-10-CM

## 2022-06-16 DIAGNOSIS — G89.29 CHRONIC BILATERAL LOW BACK PAIN WITH BILATERAL SCIATICA: ICD-10-CM

## 2022-06-16 DIAGNOSIS — M54.41 CHRONIC BILATERAL LOW BACK PAIN WITH BILATERAL SCIATICA: ICD-10-CM

## 2022-06-16 RX ORDER — HYDROCODONE BITARTRATE AND ACETAMINOPHEN 7.5; 325 MG/1; MG/1
1 TABLET ORAL 3 TIMES DAILY PRN
Qty: 75 TABLET | Refills: 0 | Status: SHIPPED | OUTPATIENT
Start: 2022-06-16 | End: 2022-07-15 | Stop reason: SDUPTHER

## 2022-06-16 NOTE — TELEPHONE ENCOUNTER
Caller: Meliza Arreola    Relationship: Self    Best call back number: [phone]    Requested Prescriptions:   Requested Prescriptions     Pending Prescriptions Disp Refills   • HYDROcodone-acetaminophen (NORCO) 7.5-325 MG per tablet 75 tablet 0     Sig: Take 1 tablet by mouth 3 (Three) Times a Day As Needed for Moderate Pain . for pain      Pharmacy where request should be sent: [facility] [address] [place] [address] [address] [facility] [phone] [facility] [phone] FX     Additional details provided by patient: PATIENT STATED THAT SHE HAS 1 DAY REMAINING   Does the patient have less than a 3 day supply:  [x] Yes  [] No    Srinivasan BARBOUR   06/16/22 11:41 EDT

## 2022-07-08 ENCOUNTER — OFFICE VISIT (OUTPATIENT)
Dept: FAMILY MEDICINE CLINIC | Age: 62
End: 2022-07-08

## 2022-07-08 VITALS
DIASTOLIC BLOOD PRESSURE: 52 MMHG | HEART RATE: 60 BPM | BODY MASS INDEX: 32.17 KG/M2 | SYSTOLIC BLOOD PRESSURE: 109 MMHG | WEIGHT: 188.4 LBS | HEIGHT: 64 IN

## 2022-07-08 DIAGNOSIS — F33.1 MAJOR DEPRESSIVE DISORDER, RECURRENT EPISODE, MODERATE DEGREE: ICD-10-CM

## 2022-07-08 DIAGNOSIS — M54.41 CHRONIC BILATERAL LOW BACK PAIN WITH BILATERAL SCIATICA: ICD-10-CM

## 2022-07-08 DIAGNOSIS — J44.9 CHRONIC OBSTRUCTIVE PULMONARY DISEASE, UNSPECIFIED COPD TYPE: ICD-10-CM

## 2022-07-08 DIAGNOSIS — Z79.899 HIGH RISK MEDICATION USE: ICD-10-CM

## 2022-07-08 DIAGNOSIS — I49.5 SICK SINUS SYNDROME: ICD-10-CM

## 2022-07-08 DIAGNOSIS — G89.29 CHRONIC BILATERAL LOW BACK PAIN WITH BILATERAL SCIATICA: ICD-10-CM

## 2022-07-08 DIAGNOSIS — E78.2 MIXED HYPERLIPIDEMIA: ICD-10-CM

## 2022-07-08 DIAGNOSIS — I48.11 LONGSTANDING PERSISTENT ATRIAL FIBRILLATION: ICD-10-CM

## 2022-07-08 DIAGNOSIS — I10 ESSENTIAL HYPERTENSION: ICD-10-CM

## 2022-07-08 DIAGNOSIS — E11.42 TYPE 2 DIABETES MELLITUS WITH DIABETIC POLYNEUROPATHY, WITHOUT LONG-TERM CURRENT USE OF INSULIN: ICD-10-CM

## 2022-07-08 DIAGNOSIS — Z00.00 ANNUAL PHYSICAL EXAM: Primary | ICD-10-CM

## 2022-07-08 DIAGNOSIS — M54.42 CHRONIC BILATERAL LOW BACK PAIN WITH BILATERAL SCIATICA: ICD-10-CM

## 2022-07-08 PROCEDURE — 80305 DRUG TEST PRSMV DIR OPT OBS: CPT | Performed by: FAMILY MEDICINE

## 2022-07-08 PROCEDURE — 1159F MED LIST DOCD IN RCRD: CPT | Performed by: FAMILY MEDICINE

## 2022-07-08 PROCEDURE — 1170F FXNL STATUS ASSESSED: CPT | Performed by: FAMILY MEDICINE

## 2022-07-08 PROCEDURE — G0439 PPPS, SUBSEQ VISIT: HCPCS | Performed by: FAMILY MEDICINE

## 2022-07-08 PROCEDURE — 99214 OFFICE O/P EST MOD 30 MIN: CPT | Performed by: FAMILY MEDICINE

## 2022-07-08 RX ORDER — ATORVASTATIN CALCIUM 10 MG/1
10 TABLET, FILM COATED ORAL DAILY
Qty: 90 TABLET | Refills: 1 | Status: SHIPPED | OUTPATIENT
Start: 2022-07-08 | End: 2023-01-20
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
## 2022-07-08 NOTE — ASSESSMENT & PLAN NOTE
She is on Invokana and glipizide for DM.  Last A1c was 6.9. in April.  She is on an ACEI and statin.  She is up-to-date on foot exam, last done 12/2021.  She is UTD on eye exam, last done 6/2022.  She is on Lyrica for diabetic peripheral neuropathy.  No adverse effects.

## 2022-07-08 NOTE — ASSESSMENT & PLAN NOTE
Stable on current regimen.  Symptoms are well controlled.  No adverse effects. She does require ongoing use of this controlled substance to function.  Tox screen was due today.  Prior tox screen appropriate.  ERWIN was run today.  Refills were not needed today.  RTC 3 months.

## 2022-07-08 NOTE — ASSESSMENT & PLAN NOTE
She is stable on Cartia  mg daily, metoprolol succinate 50 mg daily, losartan 25 mg daily and HCTZ 12.5 mg daily.  No refills needed.  Labs are reviewed and up-to-date.

## 2022-07-08 NOTE — ASSESSMENT & PLAN NOTE
She is UTD on colonoscopy, last done 11/2014 and this showed hemorrhoids.  Ten year repeat recommended.  She is due for pap smear, last done 2/2019 and this showed NIL.  No HPV testing done d/t Medicare, so three year repeat recommended; we will get her scheduled for this.  She is UTD on mammogram, last done 4/2022 and this was normal.  She is UTD on Pneumovax (11/2019).  She is due for COVID, Shingrix and Td; declines all vaccines today.  Flu shot not currently indicated.  She is UTD on routine labs.

## 2022-07-11 PROCEDURE — G0480 DRUG TEST DEF 1-7 CLASSES: HCPCS | Performed by: FAMILY MEDICINE

## 2022-07-14 DIAGNOSIS — E11.42 DIABETIC PERIPHERAL NEUROPATHY: ICD-10-CM

## 2022-07-14 RX ORDER — PREGABALIN 150 MG/1
CAPSULE ORAL
Qty: 60 CAPSULE | Refills: 0 | Status: SHIPPED | OUTPATIENT
Start: 2022-07-14 | End: 2022-08-10

## 2022-07-15 ENCOUNTER — TELEPHONE (OUTPATIENT)
Dept: FAMILY MEDICINE CLINIC | Age: 62
End: 2022-07-15

## 2022-07-15 DIAGNOSIS — G89.29 CHRONIC BILATERAL LOW BACK PAIN WITH BILATERAL SCIATICA: ICD-10-CM

## 2022-07-15 DIAGNOSIS — M54.42 CHRONIC BILATERAL LOW BACK PAIN WITH BILATERAL SCIATICA: ICD-10-CM

## 2022-07-15 DIAGNOSIS — M54.41 CHRONIC BILATERAL LOW BACK PAIN WITH BILATERAL SCIATICA: ICD-10-CM

## 2022-07-15 NOTE — TELEPHONE ENCOUNTER
Caller: Meliza Arreola    Relationship: Self    Best call back number: [phone]    Requested Prescriptions:   Requested Prescriptions     Pending Prescriptions Disp Refills   • HYDROcodone-acetaminophen (NORCO) 7.5-325 MG per tablet 75 tablet 0     Sig: Take 1 tablet by mouth 3 (Three) Times a Day As Needed for Moderate Pain . for pain        Pharmacy where request should be sent: [facility] [address] [place] [address] [place] [facility] [phone] [facility] [phone] FX         Does the patient have less than a 3 day supply:  [] Yes  [x] No    Srinivasan Cobb Rep   07/15/22 09:51 EDT         PATIENT STATES NOT SO FILL THIS UNTIL DR MARTÍNEZ IS BACK FROM VACATION. PATIENT WANTS A FULL REFILL AS NOT TO PAY A COPAY ON MED TWICE.

## 2022-07-18 NOTE — TELEPHONE ENCOUNTER
Caller: Meliza Arreola    Relationship: Self    Best call back number: [phone]    Requested Prescriptions:   Requested Prescriptions     Pending Prescriptions Disp Refills   • HYDROcodone-acetaminophen (NORCO) 7.5-325 MG per tablet 75 tablet 0     Sig: Take 1 tablet by mouth 3 (Three) Times a Day As Needed for Moderate Pain . for pain        Pharmacy where request should be sent: [facility] [address] [place] [address] [place] [facility] [phone] [facility] [phone] FX     Additional details provided by patient:PATIENT STATED THAT SHE HAS BEEN OUT OF MEDICATION SINCE 7/15/22 AND WANTING TO  FULL REFILL TODAY IF POSSIBLE.     Does the patient have less than a 3 day supply:  [x] Yes  [] No    Srinivasan BARBOUR   07/18/22 08:29 EDT

## 2022-07-18 NOTE — TELEPHONE ENCOUNTER
Spoke to pt and informed her [place] was not back until tomorrow and she wanted to wait until she got back to get it filled.

## 2022-07-19 RX ORDER — HYDROCODONE BITARTRATE AND ACETAMINOPHEN 7.5; 325 MG/1; MG/1
1 TABLET ORAL 3 TIMES DAILY PRN
Qty: 75 TABLET | Refills: 0 | Status: SHIPPED | OUTPATIENT
Start: 2022-07-19 | End: 2022-08-17 | Stop reason: SDUPTHER

## 2022-07-19 NOTE — TELEPHONE ENCOUNTER
PATIENT CALLED TO MAKE SURE THIS PRESCRIPTION GETS SENT TODAY SINCE DR. MARTÍNEZ IS BACK IN THE OFFICE. SHE IS TOTALLY OUT OF MEDICATION.

## 2022-07-21 ENCOUNTER — OFFICE VISIT (OUTPATIENT)
Dept: FAMILY MEDICINE CLINIC | Age: 62
End: 2022-07-21

## 2022-07-21 VITALS
SYSTOLIC BLOOD PRESSURE: 103 MMHG | WEIGHT: 190 LBS | HEIGHT: 64 IN | HEART RATE: 69 BPM | DIASTOLIC BLOOD PRESSURE: 68 MMHG | BODY MASS INDEX: 32.44 KG/M2

## 2022-07-21 DIAGNOSIS — M54.42 CHRONIC BILATERAL LOW BACK PAIN WITH BILATERAL SCIATICA: Primary | ICD-10-CM

## 2022-07-21 DIAGNOSIS — M54.2 CERVICALGIA: ICD-10-CM

## 2022-07-21 DIAGNOSIS — G89.29 CHRONIC BILATERAL LOW BACK PAIN WITH BILATERAL SCIATICA: Primary | ICD-10-CM

## 2022-07-21 DIAGNOSIS — M54.41 CHRONIC BILATERAL LOW BACK PAIN WITH BILATERAL SCIATICA: Primary | ICD-10-CM

## 2022-07-21 PROCEDURE — 99214 OFFICE O/P EST MOD 30 MIN: CPT | Performed by: FAMILY MEDICINE

## 2022-07-21 RX ORDER — CYCLOBENZAPRINE HCL 10 MG
10 TABLET ORAL 3 TIMES DAILY PRN
COMMUNITY
Start: 2022-06-22
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
## 2022-07-24 LAB
HYDROCODONE UR-MCNC: 413 NG/ML
HYDROMORPHONE UR-MCNC: 51 NG/ML

## 2022-07-26 RX ORDER — BLOOD SUGAR DIAGNOSTIC
STRIP MISCELLANEOUS
Qty: 200 EACH | Refills: 3 | Status: SHIPPED | OUTPATIENT
Start: 2022-07-26

## 2022-08-02 RX ORDER — AMITRIPTYLINE HYDROCHLORIDE 10 MG/1
TABLET, FILM COATED ORAL
Qty: 90 TABLET | Refills: 1 | Status: SHIPPED | OUTPATIENT
Start: 2022-08-02 | End: 2023-01-31

## 2022-08-09 DIAGNOSIS — E11.42 DIABETIC PERIPHERAL NEUROPATHY: ICD-10-CM

## 2022-08-09 RX ORDER — GLIPIZIDE 5 MG/1
TABLET, FILM COATED, EXTENDED RELEASE ORAL
Qty: 180 TABLET | Refills: 0 | Status: SHIPPED | OUTPATIENT
Start: 2022-08-09 | End: 2022-11-09

## 2022-08-10 RX ORDER — PREGABALIN 150 MG/1
CAPSULE ORAL
Qty: 60 CAPSULE | Refills: 2 | Status: SHIPPED | OUTPATIENT
Start: 2022-08-10 | End: 2022-11-09

## 2022-08-17 ENCOUNTER — TELEPHONE (OUTPATIENT)
Dept: FAMILY MEDICINE CLINIC | Age: 62
End: 2022-08-17

## 2022-08-17 DIAGNOSIS — M54.42 CHRONIC BILATERAL LOW BACK PAIN WITH BILATERAL SCIATICA: ICD-10-CM

## 2022-08-17 DIAGNOSIS — M54.41 CHRONIC BILATERAL LOW BACK PAIN WITH BILATERAL SCIATICA: ICD-10-CM

## 2022-08-17 DIAGNOSIS — G89.29 CHRONIC BILATERAL LOW BACK PAIN WITH BILATERAL SCIATICA: ICD-10-CM

## 2022-08-17 RX ORDER — HYDROCODONE BITARTRATE AND ACETAMINOPHEN 7.5; 325 MG/1; MG/1
1 TABLET ORAL 3 TIMES DAILY PRN
Qty: 75 TABLET | Refills: 0 | Status: SHIPPED | OUTPATIENT
Start: 2022-08-17 | End: 2022-09-15 | Stop reason: SDUPTHER

## 2022-08-17 NOTE — TELEPHONE ENCOUNTER
Caller: Meliza Arreola    Relationship: Self    Best call back number: [phone]    Requested Prescriptions:   Requested Prescriptions     Pending Prescriptions Disp Refills   • HYDROcodone-acetaminophen (NORCO) 7.5-325 MG per tablet 75 tablet 0     Sig: Take 1 tablet by mouth 3 (Three) Times a Day As Needed for Moderate Pain . for pain        Pharmacy where request should be sent: [facility] [address] [place] [address] [place] [facility] [phone] [facility] [phone] FX     Additional details provided by patient: PATIENT WILL OUT OF MEDICATION TODAY.    Does the patient have less than a 3 day supply:  [x] Yes  [] No    Srinivasan Sanchez Rep   08/17/22 09:08 EDT

## 2022-08-25 RX ORDER — CANAGLIFLOZIN 100 MG/1
TABLET, FILM COATED ORAL
Qty: 90 TABLET | Refills: 0 | Status: SHIPPED | OUTPATIENT
Start: 2022-08-25 | End: 2022-11-21

## 2022-09-07 RX ORDER — OMEPRAZOLE 40 MG/1
CAPSULE, DELAYED RELEASE ORAL
Qty: 90 CAPSULE | Refills: 1 | Status: SHIPPED | OUTPATIENT
Start: 2022-09-07 | End: 2023-03-08

## 2022-09-10 DIAGNOSIS — J43.2 CENTRILOBULAR EMPHYSEMA: Primary | ICD-10-CM

## 2022-09-12 RX ORDER — ALBUTEROL SULFATE 90 UG/1
AEROSOL, METERED RESPIRATORY (INHALATION)
Qty: 18 G | Refills: 0 | Status: SHIPPED | OUTPATIENT
Start: 2022-09-12 | End: 2023-02-24

## 2022-09-15 DIAGNOSIS — M54.42 CHRONIC BILATERAL LOW BACK PAIN WITH BILATERAL SCIATICA: ICD-10-CM

## 2022-09-15 DIAGNOSIS — M54.41 CHRONIC BILATERAL LOW BACK PAIN WITH BILATERAL SCIATICA: ICD-10-CM

## 2022-09-15 DIAGNOSIS — G89.29 CHRONIC BILATERAL LOW BACK PAIN WITH BILATERAL SCIATICA: ICD-10-CM

## 2022-09-15 RX ORDER — HYDROCODONE BITARTRATE AND ACETAMINOPHEN 7.5; 325 MG/1; MG/1
1 TABLET ORAL 3 TIMES DAILY PRN
Qty: 75 TABLET | Refills: 0 | Status: SHIPPED | OUTPATIENT
Start: 2022-09-15 | End: 2022-10-13 | Stop reason: SDUPTHER

## 2022-09-15 NOTE — TELEPHONE ENCOUNTER
Caller: Meliza Arreola    Relationship: Self     Best call back number: [phone]    Requested Prescriptions:   Requested Prescriptions     Pending Prescriptions Disp Refills   • HYDROcodone-acetaminophen (NORCO) 7.5-325 MG per tablet 75 tablet 0     Sig: Take 1 tablet by mouth 3 (Three) Times a Day As Needed for Moderate Pain. for pain        Pharmacy where request should be sent: [place] [address] [place] [facility] [phone] [facility] [phone] FX        Does the patient have less than a 3 day supply:  [x] Yes  [] No    Srinivasan Pearson Rep   09/15/22 08:42 EDT

## 2022-09-30 LAB — HBA1C MFR BLD: 6.3 %

## 2022-10-11 RX ORDER — ALBUTEROL SULFATE 2.5 MG/3ML
SOLUTION RESPIRATORY (INHALATION)
Qty: 180 ML | Refills: 0 | Status: SHIPPED | OUTPATIENT
Start: 2022-10-11 | End: 2023-04-06 | Stop reason: SDUPTHER

## 2022-10-12 DIAGNOSIS — M54.41 CHRONIC BILATERAL LOW BACK PAIN WITH BILATERAL SCIATICA: ICD-10-CM

## 2022-10-12 DIAGNOSIS — G89.29 CHRONIC BILATERAL LOW BACK PAIN WITH BILATERAL SCIATICA: ICD-10-CM

## 2022-10-12 DIAGNOSIS — M54.42 CHRONIC BILATERAL LOW BACK PAIN WITH BILATERAL SCIATICA: ICD-10-CM

## 2022-10-12 RX ORDER — HYDROCODONE BITARTRATE AND ACETAMINOPHEN 7.5; 325 MG/1; MG/1
1 TABLET ORAL 3 TIMES DAILY PRN
Qty: 75 TABLET | Refills: 0 | Status: CANCELLED | OUTPATIENT
Start: 2022-10-12

## 2022-10-12 NOTE — TELEPHONE ENCOUNTER
Caller: Meliza Arreola    Relationship: Self    Best call back number: [phone]    Requested Prescriptions:   Requested Prescriptions     Pending Prescriptions Disp Refills   • HYDROcodone-acetaminophen (NORCO) 7.5-325 MG per tablet 75 tablet 0     Sig: Take 1 tablet by mouth 3 (Three) Times a Day As Needed for Moderate Pain. for pain        Pharmacy where request should be sent: [facility] [address] [place] [address] [place] [facility] [phone] [facility] [phone] FX     Additional details provided by patient: WOULD LIKE TO BE ABLE TO  10/13/2022    Does the patient have less than a 3 day supply:  [x] Yes  [] No    Srinivasan Stevens Rep   10/12/22 14:35 EDT

## 2022-10-13 ENCOUNTER — LAB (OUTPATIENT)
Dept: LAB | Facility: HOSPITAL | Age: 62
End: 2022-10-13

## 2022-10-13 ENCOUNTER — OFFICE VISIT (OUTPATIENT)
Dept: FAMILY MEDICINE CLINIC | Age: 62
End: 2022-10-13

## 2022-10-13 VITALS
HEIGHT: 64 IN | HEART RATE: 64 BPM | SYSTOLIC BLOOD PRESSURE: 113 MMHG | BODY MASS INDEX: 31.69 KG/M2 | DIASTOLIC BLOOD PRESSURE: 67 MMHG | WEIGHT: 185.6 LBS

## 2022-10-13 DIAGNOSIS — M54.41 CHRONIC BILATERAL LOW BACK PAIN WITH BILATERAL SCIATICA: Primary | ICD-10-CM

## 2022-10-13 DIAGNOSIS — E11.42 TYPE 2 DIABETES MELLITUS WITH DIABETIC POLYNEUROPATHY, WITHOUT LONG-TERM CURRENT USE OF INSULIN: ICD-10-CM

## 2022-10-13 DIAGNOSIS — Z79.899 HIGH RISK MEDICATION USE: ICD-10-CM

## 2022-10-13 DIAGNOSIS — G89.29 CHRONIC BILATERAL LOW BACK PAIN WITH BILATERAL SCIATICA: Primary | ICD-10-CM

## 2022-10-13 DIAGNOSIS — J30.1 SEASONAL ALLERGIC RHINITIS DUE TO POLLEN: ICD-10-CM

## 2022-10-13 DIAGNOSIS — G89.4 CHRONIC PAIN SYNDROME: ICD-10-CM

## 2022-10-13 DIAGNOSIS — M54.42 CHRONIC BILATERAL LOW BACK PAIN WITH BILATERAL SCIATICA: Primary | ICD-10-CM

## 2022-10-13 PROBLEM — Z00.00 ANNUAL PHYSICAL EXAM: Status: RESOLVED | Noted: 2022-07-08 | Resolved: 2022-10-13

## 2022-10-13 LAB — HBA1C MFR BLD: 6.6 % (ref 4.8–5.6)

## 2022-10-13 PROCEDURE — 84443 ASSAY THYROID STIM HORMONE: CPT

## 2022-10-13 PROCEDURE — 36415 COLL VENOUS BLD VENIPUNCTURE: CPT

## 2022-10-13 PROCEDURE — 99214 OFFICE O/P EST MOD 30 MIN: CPT | Performed by: FAMILY MEDICINE

## 2022-10-13 PROCEDURE — 80305 DRUG TEST PRSMV DIR OPT OBS: CPT | Performed by: FAMILY MEDICINE

## 2022-10-13 PROCEDURE — 80061 LIPID PANEL: CPT

## 2022-10-13 PROCEDURE — 83036 HEMOGLOBIN GLYCOSYLATED A1C: CPT

## 2022-10-13 PROCEDURE — 80053 COMPREHEN METABOLIC PANEL: CPT

## 2022-10-13 RX ORDER — HYDROCODONE BITARTRATE AND ACETAMINOPHEN 7.5; 325 MG/1; MG/1
1 TABLET ORAL 3 TIMES DAILY PRN
Qty: 75 TABLET | Refills: 0 | Status: SHIPPED | OUTPATIENT
Start: 2022-10-13 | End: 2022-11-11 | Stop reason: SDUPTHER

## 2022-10-13 RX ORDER — FLUTICASONE PROPIONATE 50 MCG
2 SPRAY, SUSPENSION (ML) NASAL DAILY
Qty: 16 G | Refills: 0 | Status: SHIPPED | OUTPATIENT
Start: 2022-10-13
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
## 2022-10-13 NOTE — ASSESSMENT & PLAN NOTE
Serous tympanic effusions for the most likely cause of her recent onset dizziness.  Starting her on daily Flonase to see if we can improve her symptoms.  She will call if symptoms worsen or fail to improve.

## 2022-10-13 NOTE — ASSESSMENT & PLAN NOTE
She is on Invokana and glipizide for DM.  Last A1c was 6.9. in April.  She is on an ACEI and statin.  She is up-to-date on foot exam, last done 12/2021.  She is UTD on eye exam, last done 6/2022.  She is on Lyrica for diabetic peripheral neuropathy.  No adverse effects.   She has been working on weight loss and has successfully lost 20 pounds over the past 10 months.  Keep up the great work!  We will monitor her ongoing medication requirements.

## 2022-10-13 NOTE — ASSESSMENT & PLAN NOTE
Stable on current regimen.  Symptoms are well controlled.  No adverse effects. She does require ongoing use of this controlled substance to function.  Tox screen was due today.  Prior tox screen appropriate.  ERWIN was run today.  Refills were needed today.  RTC 3 months.

## 2022-10-14 LAB
ALBUMIN SERPL-MCNC: 4.7 G/DL (ref 3.5–5.2)
ALBUMIN/GLOB SERPL: 2.1 G/DL
ALP SERPL-CCNC: 118 U/L (ref 39–117)
ALT SERPL W P-5'-P-CCNC: 24 U/L (ref 1–33)
ANION GAP SERPL CALCULATED.3IONS-SCNC: 10 MMOL/L (ref 5–15)
AST SERPL-CCNC: 28 U/L (ref 1–32)
BILIRUB SERPL-MCNC: 0.5 MG/DL (ref 0–1.2)
BUN SERPL-MCNC: 13 MG/DL (ref 6–20)
BUN/CREAT SERPL: 14.9 (ref 7–25)
CALCIUM SPEC-SCNC: 9.6 MG/DL (ref 8.6–10.5)
CHLORIDE SERPL-SCNC: 104 MMOL/L (ref 98–107)
CHOLEST SERPL-MCNC: 155 MG/DL (ref 0–200)
CO2 SERPL-SCNC: 28 MMOL/L (ref 22–29)
CREAT SERPL-MCNC: 0.87 MG/DL (ref 0.57–1)
EGFRCR SERPLBLD CKD-EPI 2021: 76.9 ML/MIN/1.73
GLOBULIN UR ELPH-MCNC: 2.2 GM/DL
GLUCOSE SERPL-MCNC: 130 MG/DL (ref 65–99)
HDLC SERPL-MCNC: 43 MG/DL (ref 40–60)
LDLC SERPL CALC-MCNC: 81 MG/DL (ref 0–100)
LDLC/HDLC SERPL: 1.77 {RATIO}
POTASSIUM SERPL-SCNC: 4.3 MMOL/L (ref 3.5–5.2)
PROT SERPL-MCNC: 6.9 G/DL (ref 6–8.5)
SODIUM SERPL-SCNC: 142 MMOL/L (ref 136–145)
TRIGL SERPL-MCNC: 180 MG/DL (ref 0–150)
TSH SERPL DL<=0.05 MIU/L-ACNC: 2.35 UIU/ML (ref 0.27–4.2)
VLDLC SERPL-MCNC: 31 MG/DL (ref 5–40)

## 2022-11-08 DIAGNOSIS — E11.42 DIABETIC PERIPHERAL NEUROPATHY: ICD-10-CM

## 2022-11-09 RX ORDER — GLIPIZIDE 5 MG/1
TABLET, FILM COATED, EXTENDED RELEASE ORAL
Qty: 180 TABLET | Refills: 1 | Status: SHIPPED | OUTPATIENT
Start: 2022-11-09

## 2022-11-09 RX ORDER — PREGABALIN 150 MG/1
CAPSULE ORAL
Qty: 60 CAPSULE | Refills: 2 | Status: SHIPPED | OUTPATIENT
Start: 2022-11-09 | End: 2023-01-31

## 2022-11-11 DIAGNOSIS — M54.41 CHRONIC BILATERAL LOW BACK PAIN WITH BILATERAL SCIATICA: ICD-10-CM

## 2022-11-11 DIAGNOSIS — G89.29 CHRONIC BILATERAL LOW BACK PAIN WITH BILATERAL SCIATICA: ICD-10-CM

## 2022-11-11 DIAGNOSIS — M54.42 CHRONIC BILATERAL LOW BACK PAIN WITH BILATERAL SCIATICA: ICD-10-CM

## 2022-11-11 RX ORDER — HYDROCODONE BITARTRATE AND ACETAMINOPHEN 7.5; 325 MG/1; MG/1
1 TABLET ORAL 3 TIMES DAILY PRN
Qty: 75 TABLET | Refills: 0 | Status: SHIPPED | OUTPATIENT
Start: 2022-11-11 | End: 2022-12-09 | Stop reason: SDUPTHER

## 2022-11-11 NOTE — TELEPHONE ENCOUNTER
Caller: Meliza Arreola    Relationship: Self    Best call back number: [phone]    Requested Prescriptions:   Requested Prescriptions     Pending Prescriptions Disp Refills   • HYDROcodone-acetaminophen (NORCO) 7.5-325 MG per tablet 75 tablet 0     Sig: Take 1 tablet by mouth 3 (Three) Times a Day As Needed for Moderate Pain. for pain        Pharmacy where request should be sent: [facility] [address] [place] [address] [place] [facility] [phone] [facility] [phone] FX     Additional details provided by patient: PATIENT HAS LESS THAN A 3 DAY SUPPLY    Does the patient have less than a 3 day supply:  [] Yes  [x] No    Srinivasan Rasmussen Rep   11/11/22 08:58 EST

## 2022-11-21 RX ORDER — CANAGLIFLOZIN 100 MG/1
TABLET, FILM COATED ORAL
Qty: 90 TABLET | Refills: 1 | Status: SHIPPED | OUTPATIENT
Start: 2022-11-21

## 2022-12-09 DIAGNOSIS — G89.29 CHRONIC BILATERAL LOW BACK PAIN WITH BILATERAL SCIATICA: ICD-10-CM

## 2022-12-09 DIAGNOSIS — M54.42 CHRONIC BILATERAL LOW BACK PAIN WITH BILATERAL SCIATICA: ICD-10-CM

## 2022-12-09 DIAGNOSIS — M54.41 CHRONIC BILATERAL LOW BACK PAIN WITH BILATERAL SCIATICA: ICD-10-CM

## 2022-12-09 RX ORDER — HYDROCODONE BITARTRATE AND ACETAMINOPHEN 7.5; 325 MG/1; MG/1
1 TABLET ORAL 3 TIMES DAILY PRN
Qty: 75 TABLET | Refills: 0 | Status: SHIPPED | OUTPATIENT
Start: 2022-12-09 | End: 2023-01-06 | Stop reason: SDUPTHER

## 2022-12-09 NOTE — TELEPHONE ENCOUNTER
Caller: Meliza Arreola    Relationship: Self    Best call back number: [phone]    Requested Prescriptions:   Requested Prescriptions     Pending Prescriptions Disp Refills   • HYDROcodone-acetaminophen (NORCO) 7.5-325 MG per tablet 75 tablet 0     Sig: Take 1 tablet by mouth 3 (Three) Times a Day As Needed for Moderate Pain. for pain        Pharmacy where request should be sent: [facility] [address] [place] [address] [place] [facility] [phone] [facility] [phone] FX     Additional details provided by patient: PATIENT HAS LESS THAN A 3 DAY SUPPLY    Does the patient have less than a 3 day supply:  [x] Yes  [] No    Would you like a call back once the refill request has been completed: [] Yes [] No    If the office needs to give you a call back, can they leave a voicemail: [] Yes [] No    Srinivasan Rasmussen Rep   12/09/22 10:46 EST

## 2023-01-06 DIAGNOSIS — M54.42 CHRONIC BILATERAL LOW BACK PAIN WITH BILATERAL SCIATICA: ICD-10-CM

## 2023-01-06 DIAGNOSIS — G89.29 CHRONIC BILATERAL LOW BACK PAIN WITH BILATERAL SCIATICA: ICD-10-CM

## 2023-01-06 DIAGNOSIS — M54.41 CHRONIC BILATERAL LOW BACK PAIN WITH BILATERAL SCIATICA: ICD-10-CM

## 2023-01-06 RX ORDER — HYDROCODONE BITARTRATE AND ACETAMINOPHEN 7.5; 325 MG/1; MG/1
1 TABLET ORAL 3 TIMES DAILY PRN
Qty: 75 TABLET | Refills: 0 | Status: SHIPPED | OUTPATIENT
Start: 2023-01-06 | End: 2023-02-02 | Stop reason: SDUPTHER

## 2023-01-06 NOTE — TELEPHONE ENCOUNTER
Caller: Meliza Arreola    Relationship: Self    Best call back number: [phone]     Requested Prescriptions:   Requested Prescriptions     Pending Prescriptions Disp Refills   • HYDROcodone-acetaminophen (NORCO) 7.5-325 MG per tablet 75 tablet 0     Sig: Take 1 tablet by mouth 3 (Three) Times a Day As Needed for Moderate Pain. for pain        Pharmacy where request should be sent: [place] [address] [place] [facility] [phone] [facility] [phone]      Additional details provided by patient: 1 DAY LEFT OF MEDICATION     Does the patient have less than a 3 day supply:  [x] Yes  [] No    Would you like a call back once the refill request has been completed: [] Yes [x] No    If the office needs to give you a call back, can they leave a voicemail: [] Yes [x] No    Srinivasan Cordova Rep   01/06/23 08:48 EST

## 2023-01-11 ENCOUNTER — OFFICE VISIT (OUTPATIENT)
Dept: FAMILY MEDICINE CLINIC | Age: 63
End: 2023-01-11
Payer: MEDICARE

## 2023-01-11 VITALS
DIASTOLIC BLOOD PRESSURE: 51 MMHG | BODY MASS INDEX: 32.64 KG/M2 | SYSTOLIC BLOOD PRESSURE: 110 MMHG | HEIGHT: 64 IN | HEART RATE: 59 BPM | WEIGHT: 191.2 LBS

## 2023-01-11 DIAGNOSIS — E11.42 TYPE 2 DIABETES MELLITUS WITH DIABETIC POLYNEUROPATHY, WITHOUT LONG-TERM CURRENT USE OF INSULIN: ICD-10-CM

## 2023-01-11 DIAGNOSIS — J43.2 CENTRILOBULAR EMPHYSEMA: ICD-10-CM

## 2023-01-11 DIAGNOSIS — Z79.899 HIGH RISK MEDICATION USE: ICD-10-CM

## 2023-01-11 DIAGNOSIS — G89.4 CHRONIC PAIN SYNDROME: Primary | ICD-10-CM

## 2023-01-11 DIAGNOSIS — F33.1 MAJOR DEPRESSIVE DISORDER, RECURRENT EPISODE, MODERATE DEGREE: ICD-10-CM

## 2023-01-11 DIAGNOSIS — I48.11 LONGSTANDING PERSISTENT ATRIAL FIBRILLATION: ICD-10-CM

## 2023-01-11 DIAGNOSIS — I49.5 SICK SINUS SYNDROME: ICD-10-CM

## 2023-01-11 DIAGNOSIS — I10 ESSENTIAL HYPERTENSION: ICD-10-CM

## 2023-01-11 DIAGNOSIS — I73.9 VASCULAR CLAUDICATION: ICD-10-CM

## 2023-01-11 DIAGNOSIS — E78.2 MIXED HYPERLIPIDEMIA: ICD-10-CM

## 2023-01-11 DIAGNOSIS — Z87.891 PERSONAL HISTORY OF TOBACCO USE, PRESENTING HAZARDS TO HEALTH: ICD-10-CM

## 2023-01-11 PROCEDURE — G0480 DRUG TEST DEF 1-7 CLASSES: HCPCS | Performed by: FAMILY MEDICINE

## 2023-01-11 PROCEDURE — 80305 DRUG TEST PRSMV DIR OPT OBS: CPT | Performed by: FAMILY MEDICINE

## 2023-01-11 PROCEDURE — 99214 OFFICE O/P EST MOD 30 MIN: CPT | Performed by: FAMILY MEDICINE

## 2023-01-11 RX ORDER — SEMAGLUTIDE 1.34 MG/ML
0.25 INJECTION, SOLUTION SUBCUTANEOUS WEEKLY
Qty: 1.5 ML | Refills: 1 | Status: SHIPPED | OUTPATIENT
Start: 2023-01-11 | End: 2023-02-03 | Stop reason: SDUPTHER

## 2023-01-11 RX ORDER — ASPIRIN 81 MG/1
81 TABLET, CHEWABLE ORAL DAILY
COMMUNITY

## 2023-01-11 RX ORDER — AZELAIC ACID 0.15 G/G
GEL TOPICAL
COMMUNITY
Start: 2022-12-08

## 2023-01-11 NOTE — ASSESSMENT & PLAN NOTE
Following with Cardiology.  Status post pacemaker placement for sick sinus syndrome.  She has a Watchman device.  No longer on a DOAC but does continue on Plavix for her CAD.  Stable on beta-blocker, ARB, aspirin/Plavix and statin.  No refills needed.  Labs are reviewed and up-to-date.

## 2023-01-11 NOTE — ASSESSMENT & PLAN NOTE
Gradually worsening symptoms of leg pain brought on by exertion after a reliable distance and relieved by few minutes of rest.  Sounds concerning for vascular claudication.  She certainly has cardiovascular disease other places throughout the body.  Checking with ABIs.  Plan for referral to Vascular Surgery if needed.

## 2023-01-11 NOTE — ASSESSMENT & PLAN NOTE
She is on Invokana and glipizide for DM.  Last A1c was 6.9. in April.  She is on an ACEI and statin.  She is due for foot exam, last done 12/2021; exam today shows decree sensation.  She is UTD on eye exam, last done 6/2022.  She is on Lyrica for diabetic peripheral neuropathy.  No adverse effects.  She is interested in trying Ozempic to see if it would help with weight loss.  We will start that today.

## 2023-01-11 NOTE — ASSESSMENT & PLAN NOTE
Blood pressure at goal.  Continue diltiazem 120 mg daily, HCTZ 12.5 mg daily, losartan 25 mg daily and metoprolol succinate 50 mg daily.  No refills needed.  Labs are reviewed and up-to-date.

## 2023-01-11 NOTE — ASSESSMENT & PLAN NOTE
Pain complaints do seem to be gradually worsening over time.  She is having increasing neuropathic pain in the feet as well as increasing back pain.  We are proceeding with a evaluation for possible vascular claudication with ABIs but if that is unrevealing, she is not averse to going down to [facility] for further evaluation and treatment.  In the meantime, no adverse effects. She does require ongoing use of this controlled substance to function.  Tox screen was due today.  Prior tox screen appropriate.  ERWIN was run today.  Refills were not needed today.  RTC 3 months.

## 2023-01-11 NOTE — PROGRESS NOTES
Chief Complaint  Back Pain (3 month follow up)    Subjective          Meliza Arreola presents to [facility] today for routine follow-up on chronic issues.    She is on Norco 3 times daily as needed (although sometimes she only takes 2 tabs), cyclobenzaprine and amitriptyline for chronic pain syndrome.  She uses Norco #75 per month. Pain is worst in the low back, knees and shoulders.  She also has diabetic peripheral neuropathy in the feet for which she is on Lyrica.  She had to stop NSAIDs when she began anticoagulation with Eliquis for a-fib.  S/p PT and epidurals in the past but without significant relief.  S/p L TKA.  She does follow with Dr. Stout.  Status post Physical Therapy and she does follow a home exercise program.      She is on Invokana and glipizide for DM.  Last A1c was 6.9. in April.  She is on an ACEI and statin.  She is due for foot exam, last done 12/2021.  She is UTD on eye exam, last done 6/2022.  She is on Lyrica for diabetic peripheral neuropathy.  No adverse effects.  She is interested in trying Ozempic to see if it would help with weight loss.     She is on diltiazem XT, metoprolol succinate, losartan, and  HCTZ for hypertension and atrial fibrillation.  BP has been well controlled.  She is on atorvastatin for HLD.  She denies myalgias.  She has a Watchman Device.  She is on Plavix and no longer taking DOAC. S/p pacemaker placement for SSS.  She does note leg pain bilaterally with exertion, relieved by rest after a few minutes.  It is very reliable in onset.      She is on Anoro Ellipta for COPD.  She does have some baseline SOB.  Following with Yuly Chase and Dr. Mcfarlane.      She is on amitriptyline, topiramate, aripiprazole, and hydroxyzine prn for chronic PTSD.  Following with Dr. Romero [facility] who manages her meds.  She is no longer taking buspirone or clonazepam.      She is on omeprazole for GERD.  Denies indigestion or  heartburn.      She has DUNCAN and has been very faithful with BiPAP.       Current Outpatient Medications:   •  albuterol (PROVENTIL) (2.5 MG/3ML) 0.083% nebulizer solution, USE 1 VIAL IN NEBULIZER EVERY 6 HOURS AS NEEDED FOR SHORTNESS OF BREATH AND WHEEZING, Disp: 180 mL, Rfl: 0  •  albuterol sulfate  (90 Base) MCG/ACT inhaler, Inhale 2 puffs by mouth twice daily, Disp: 18 g, Rfl: 0  •  amitriptyline (ELAVIL) 10 MG tablet, TAKE 1 TABLET BY MOUTH ONCE DAILY AT NIGHT, Disp: 90 tablet, Rfl: 1  •  aspirin 81 MG chewable tablet, Chew 81 mg Daily., Disp: , Rfl:   •  atorvastatin (LIPITOR) 10 MG tablet, Take 1 tablet by mouth Daily., Disp: 90 tablet, Rfl: 1  •  azelaic acid (AZELEX) 15 % gel, APPLY A THIN LAYER TOPICALLY TO FACE TWICE DAILY, Disp: , Rfl:   •  Cartia  MG 24 hr capsule, Take 120 mg by mouth Daily., Disp: , Rfl:   •  clopidogrel (PLAVIX) 75 MG tablet, Take 75 mg by mouth Daily., Disp: , Rfl:   •  cyclobenzaprine (FLEXERIL) 10 MG tablet, Take 10 mg by mouth 3 (Three) Times a Day As Needed. for muscle spams, Disp: , Rfl:   •  fluticasone (FLONASE) 50 MCG/ACT nasal spray, 2 sprays into the nostril(s) as directed by provider Daily., Disp: 16 g, Rfl: 0  •  glipizide (GLUCOTROL XL) 5 MG ER tablet, Take 1 tablet by mouth twice daily, Disp: 180 tablet, Rfl: 1  •  hydroCHLOROthiazide (MICROZIDE) 12.5 MG capsule, hydrochlorothiazide 12.5 mg oral capsule take 1 capsule (12.5 mg) by oral route once daily   Active, Disp: , Rfl:   •  HYDROcodone-acetaminophen (NORCO) 7.5-325 MG per tablet, Take 1 tablet by mouth 3 (Three) Times a Day As Needed for Moderate Pain. for pain, Disp: 75 tablet, Rfl: 0  •  hydrOXYzine (ATARAX) 50 MG tablet, Take 1 tablet by mouth Every 8 (Eight) Hours As Needed for Anxiety., Disp: 90 tablet, Rfl: 2  •  Invokana 100 MG tablet tablet, Take 1 tablet by mouth once daily, Disp: 90 tablet, Rfl: 1  •  losartan (COZAAR) 25 MG tablet, Take 25 mg by mouth Daily., Disp: , Rfl:   •  metoprolol  "succinate XL (TOPROL-XL) 50 MG 24 hr tablet, Take 50 mg by mouth Daily., Disp: , Rfl:   •  metroNIDAZOLE (METROGEL) 0.75 % gel, Apply  topically to the appropriate area as directed Daily., Disp: 45 g, Rfl: 0  •  mupirocin (BACTROBAN) 2 % ointment, APPLY TOPICALLY TO AFFECTED NOSTRILS 2 TO 3 TIMES DAILY, Disp: , Rfl:   •  omeprazole (priLOSEC) 40 MG capsule, Take 1 capsule by mouth once daily, Disp: 90 capsule, Rfl: 1  •  OneTouch Ultra test strip, USE 1 STRIP TO CHECK GLUCOSE TWICE DAILY, Disp: 200 each, Rfl: 3  •  pregabalin (LYRICA) 150 MG capsule, Take 1 capsule by mouth twice daily, Disp: 60 capsule, Rfl: 2  •  umeclidinium-vilanterol (Anoro Ellipta) 62.5-25 MCG/INH aerosol powder  inhaler, Inhale 1 puff Daily., Disp: 180 each, Rfl: 4  •  Semaglutide,0.25 or 0.5MG/DOS, (Ozempic, 0.25 or 0.5 MG/DOSE,) 2 MG/1.5ML solution pen-injector, Inject 0.25 mg under the skin into the appropriate area as directed 1 (One) Time Per Week., Disp: 1.5 mL, Rfl: 1    Allergies:  Methylprednisolone      Objective   Vital Signs:   Vitals:    01/11/23 1010   BP: 110/51   BP Location: Left arm   Patient Position: Sitting   Pulse: 59   Weight: 86.7 kg (191 lb 3.2 oz)   Height: 162.6 cm (64.02\")       Physical Exam  Vitals reviewed.   Constitutional:       General: She is not in acute distress.     Appearance: Normal appearance. She is well-developed.   HENT:      Head: Normocephalic and atraumatic.      Right Ear: External ear normal. A middle ear effusion is present. Tympanic membrane is not erythematous or bulging.      Left Ear: External ear normal. A middle ear effusion is present. Tympanic membrane is not erythematous or bulging.   Eyes:      Extraocular Movements: Extraocular movements intact.      Conjunctiva/sclera: Conjunctivae normal.      Pupils: Pupils are equal, round, and reactive to light.   Cardiovascular:      Rate and Rhythm: Normal rate and regular rhythm.      Pulses:           Dorsalis pedis pulses are 2+ on the " right side and 2+ on the left side.      Heart sounds: No murmur heard.  Pulmonary:      Effort: Pulmonary effort is normal.      Breath sounds: Normal breath sounds. No wheezing, rhonchi or rales.   Abdominal:      General: Bowel sounds are normal. There is no distension.      Palpations: Abdomen is soft.      Tenderness: There is no abdominal tenderness.   Musculoskeletal:         General: No tenderness. Normal range of motion.   Feet:      Right foot:      Protective Sensation: 3 sites tested. 1 site sensed.     Skin integrity: Skin integrity normal. No ulcer or blister.      Toenail Condition: Right toenails are normal.      Left foot:      Protective Sensation: 3 sites tested. 1 site sensed.     Skin integrity: Skin integrity normal. No ulcer or blister.      Toenail Condition: Left toenails are normal.      Comments: Diabetic Foot Exam Performed     Neurological:      Mental Status: She is alert.   Psychiatric:         Mood and Affect: Mood and affect normal.         Behavior: Behavior normal.          Lab Results   Component Value Date    GLUCOSE 130 (H) 10/13/2022    BUN 13 10/13/2022    CREATININE 0.87 10/13/2022    EGFRIFNONA 57 (L) 08/19/2021    BCR 14.9 10/13/2022    K 4.3 10/13/2022    CO2 28.0 10/13/2022    CALCIUM 9.6 10/13/2022    ALBUMIN 4.70 10/13/2022    LABIL2 1.4 02/17/2021    AST 28 10/13/2022    ALT 24 10/13/2022       Lab Results   Component Value Date    CHOL 155 10/13/2022    CHLPL 184 02/17/2021    TRIG 180 (H) 10/13/2022    HDL 43 10/13/2022    LDL 81 10/13/2022            Assessment and Plan    Diagnoses and all orders for this visit:    1. Chronic pain syndrome (Primary)  Assessment & Plan:  Pain complaints do seem to be gradually worsening over time.  She is having increasing neuropathic pain in the feet as well as increasing back pain.  We are proceeding with a evaluation for possible vascular claudication with ABIs but if that is unrevealing, she is not averse to going down to [facility] for further evaluation and treatment.  In the meantime, no adverse effects. She does require ongoing use of this controlled substance to function.  Tox screen was due today.  Prior tox screen appropriate.  ERWIN was run today.  Refills were not needed today.  RTC 3 months.        2. High risk medication use  -     POC Urine Drug Screen [facility]    3. Vascular claudication (HCC)  Assessment & Plan:  Gradually worsening symptoms of leg pain brought on by exertion after a reliable distance and relieved by few minutes of rest.  Sounds concerning for vascular claudication.  She certainly has cardiovascular disease other places throughout the body.  Checking with ABIs.  Plan for referral to Vascular Surgery if needed.    Orders:  -     Doppler Ankle Brachial Index Single Level CAR; Future    4. Type 2 diabetes mellitus with diabetic polyneuropathy, without long-term current use of insulin (HCC)  Assessment & Plan:  She is on Invokana and glipizide for DM.  Last A1c was 6.9. in April.  She is on an ACEI and statin.  She is due for foot exam, last done 12/2021; exam today shows decree sensation.  She is UTD on eye exam, last done 6/2022.  She is on Lyrica for diabetic peripheral neuropathy.  No adverse effects.  She is interested in trying Ozempic to see if it would help with weight loss.  We will start that today.    Orders:  -     Semaglutide,0.25 or 0.5MG/DOS, (Ozempic, 0.25 or 0.5 MG/DOSE,) 2 MG/1.5ML solution pen-injector; Inject 0.25 mg under the skin into the appropriate area as directed 1 (One) Time Per Week.  Dispense: 1.5 mL; Refill: 1    5. Essential hypertension  Assessment & Plan:  Blood pressure at goal.  Continue diltiazem 120 mg daily, HCTZ 12.5 mg daily, losartan 25 mg daily and metoprolol succinate 50 mg daily.  No refills needed.  Labs are reviewed and up-to-date.      6. Mixed hyperlipidemia  Assessment & Plan:  Stable on atorvastatin 10 mg daily.  No refills needed.  Labs  reviewed and up-to-date.      7. Longstanding persistent atrial fibrillation (HCC)  Assessment & Plan:  Following with Cardiology.  Status post pacemaker placement for sick sinus syndrome.  She has a Watchman device.  No longer on a DOAC but does continue on Plavix for her CAD.  Stable on beta-blocker, ARB, aspirin/Plavix and statin.  No refills needed.  Labs are reviewed and up-to-date.      8. Sick sinus syndrome (HCC)  Overview:  As per A. fib plan.      9. Centrilobular emphysema (HCC)  Assessment & Plan:  Stable.  Following with Pulmonology.  Continue Anoro Ellipta.  No refills needed today.      10. Major depressive disorder, recurrent episode, moderate degree (HCC)  Assessment & Plan:  Following with Psychiatry.  Dr. Overton is managing medications.  Continue to monitor.      11. Personal history of tobacco use, presenting hazards to health  -      CT Chest Low Dose Cancer Screening WO; Future      Follow Up   Return in about 3 months (around 4/11/2023) for Recheck.  Patient was given instructions and counseling regarding her condition or for health maintenance advice. Please see specific information pulled into the AVS if appropriate.

## 2023-01-17 ENCOUNTER — TELEPHONE (OUTPATIENT)
Dept: FAMILY MEDICINE CLINIC | Age: 63
End: 2023-01-17

## 2023-01-17 NOTE — TELEPHONE ENCOUNTER
FYI- this was the phone call I told you about earlier.  They must have called back.  Daphne also called me and she said she was going to call them and have them send us a form, but when I told them that on the first phone call they said they no longer do faxes.

## 2023-01-17 NOTE — TELEPHONE ENCOUNTER
[facility]    Relationship: Other    Best call back number: [phone]    What form or medical record are you requesting: OUT OF STATION FORM    Who is requesting this form or medical record from you: [facility]    How would you like to receive the form or medical records (pick-up, mail, fax): WEBSITE  WEBSITE: [employer]    Timeframe paperwork needed: AS SOON AS POSSIBLE    Additional notes:   [facility] IS ASKING CLINICAL STAFF TO FILL OUT OUT OF STATION FORM THAT IS NOW AVAILABLE TO FILL OUT ONLINE AT WEBSITE LISTED ABOVE.

## 2023-01-19 DIAGNOSIS — E78.2 MIXED HYPERLIPIDEMIA: ICD-10-CM

## 2023-01-19 LAB
HYDROCODONE UR-MCNC: 758 NG/ML
HYDROMORPHONE UR-MCNC: 54 NG/ML

## 2023-01-19 NOTE — TELEPHONE ENCOUNTER
The legal team has reviewed and states that this is a legitimate service through [employer].  I therefore went to the website and entered Meliza's member ID in an attempt to complete the attestation, but unfortunately, the form would not recognize her ID number and kept throwing up an error message.  I await [employer]'s next instructions.  Thanks, ZIA

## 2023-01-19 NOTE — TELEPHONE ENCOUNTER
"I have never before been told to use what appears to be an online third party vendor website in order to confirm benefits for a patient through their insurance.  The reported [facility] representative told staff that \"we don't send faxes anymore\" when we requested a faxed form rather than the website.  Concern for possible identify theft scam or phishing.  This has been turned over to the [facility] Legal Dept for review to ensure that we keep Mayuri's PHI secure.  Thanks, BZM  "

## 2023-01-20 RX ORDER — ATORVASTATIN CALCIUM 10 MG/1
TABLET, FILM COATED ORAL
Qty: 90 TABLET | Refills: 0 | Status: SHIPPED | OUTPATIENT
Start: 2023-01-20

## 2023-01-23 ENCOUNTER — TELEPHONE (OUTPATIENT)
Dept: FAMILY MEDICINE CLINIC | Age: 63
End: 2023-01-23

## 2023-01-23 NOTE — TELEPHONE ENCOUNTER
MELISA CALLED IN FROM [facility] TO PROVIDE PCP WITH WEBSITE FOR THE OUT OF STATION FORM FOR PATIENT THE WEBSITE IS WWW.[facility] [email or web address]/[facility]/[email or web address]

## 2023-01-24 NOTE — TELEPHONE ENCOUNTER
This website is requiring a login, which I do not have, and there is no link to set up an account.  Please ask [facility] to let me know next steps.  Thanks, ZIA

## 2023-01-27 NOTE — TELEPHONE ENCOUNTER
Team 5, can you please check with Keisha to see if she can walk you through the steps of filling this out?  Apparently she was able to complete one for a patient last week.  Sindy also may have gotten one to go through.  Thanks, ZIA

## 2023-01-31 DIAGNOSIS — E11.42 DIABETIC PERIPHERAL NEUROPATHY: ICD-10-CM

## 2023-01-31 RX ORDER — AMITRIPTYLINE HYDROCHLORIDE 10 MG/1
10 TABLET, FILM COATED ORAL NIGHTLY
Qty: 90 TABLET | Refills: 1 | Status: SHIPPED | OUTPATIENT
Start: 2023-01-31

## 2023-01-31 RX ORDER — PREGABALIN 150 MG/1
150 CAPSULE ORAL 2 TIMES DAILY
Qty: 60 CAPSULE | Refills: 2 | Status: SHIPPED | OUTPATIENT
Start: 2023-01-31

## 2023-02-02 DIAGNOSIS — G89.29 CHRONIC BILATERAL LOW BACK PAIN WITH BILATERAL SCIATICA: ICD-10-CM

## 2023-02-02 DIAGNOSIS — M54.41 CHRONIC BILATERAL LOW BACK PAIN WITH BILATERAL SCIATICA: ICD-10-CM

## 2023-02-02 DIAGNOSIS — M54.42 CHRONIC BILATERAL LOW BACK PAIN WITH BILATERAL SCIATICA: ICD-10-CM

## 2023-02-02 NOTE — TELEPHONE ENCOUNTER
Caller: Meliza Arreola    Relationship: Self    Best call back number: [phone]    Requested Prescriptions:   Requested Prescriptions     Pending Prescriptions Disp Refills   • HYDROcodone-acetaminophen (NORCO) 7.5-325 MG per tablet 75 tablet 0     Sig: Take 1 tablet by mouth 3 (Three) Times a Day As Needed for Moderate Pain. for pain        Pharmacy where request should be sent: [place] [address] [facility] [phone] [facility] [phone] FX     Additional details provided by patient:      Does the patient have less than a 3 day supply:  [x] Yes  [] No    Would you like a call back once the refill request has been completed: [] Yes [x] No    If the office needs to give you a call back, can they leave a voicemail: [] Yes [x] No    Srinivasan Ledezma Rep   02/02/23 11:58 EST

## 2023-02-03 ENCOUNTER — TELEPHONE (OUTPATIENT)
Dept: FAMILY MEDICINE CLINIC | Age: 63
End: 2023-02-03

## 2023-02-03 DIAGNOSIS — E11.42 TYPE 2 DIABETES MELLITUS WITH DIABETIC POLYNEUROPATHY, WITHOUT LONG-TERM CURRENT USE OF INSULIN: ICD-10-CM

## 2023-02-03 RX ORDER — SEMAGLUTIDE 1.34 MG/ML
0.5 INJECTION, SOLUTION SUBCUTANEOUS WEEKLY
Qty: 1.5 ML | Refills: 1 | Status: SHIPPED | OUTPATIENT
Start: 2023-02-03 | End: 2023-03-08 | Stop reason: SDUPTHER

## 2023-02-03 RX ORDER — HYDROCODONE BITARTRATE AND ACETAMINOPHEN 7.5; 325 MG/1; MG/1
1 TABLET ORAL 3 TIMES DAILY PRN
Qty: 75 TABLET | Refills: 0 | Status: SHIPPED | OUTPATIENT
Start: 2023-02-03 | End: 2023-02-28 | Stop reason: SDUPTHER

## 2023-02-03 NOTE — TELEPHONE ENCOUNTER
Caller: Meliza Arreola A    Relationship to patient: Self    Best call back number:[phone]    Patient is needing: PATIENT STATED THAT SHE IS TAKING OZEMPIC AND HER 4TH SHOT IS ON Sunday. PATIENT ASKED IF SHE IS GOING TO GET A NEW PRESCRIPTION WITH AN INCREASED DOSAGE BECAUSE THE PHARMACIST TOLD HER THAT THE DOCTOR USUALLY INCREASES THIS AFTER A MONTH? PLEASE CALL THE PATIENT AND LET HER KNOW.

## 2023-02-07 ENCOUNTER — HOSPITAL ENCOUNTER (OUTPATIENT)
Dept: CARDIOLOGY | Facility: HOSPITAL | Age: 63
Discharge: HOME | End: 2023-02-07
Payer: MEDICARE

## 2023-02-07 ENCOUNTER — HOSPITAL ENCOUNTER (OUTPATIENT)
Dept: CT IMAGING | Facility: HOSPITAL | Age: 63
Discharge: HOME | End: 2023-02-07
Payer: MEDICARE

## 2023-02-07 DIAGNOSIS — Z87.891 PERSONAL HISTORY OF TOBACCO USE, PRESENTING HAZARDS TO HEALTH: ICD-10-CM

## 2023-02-07 DIAGNOSIS — I73.9 VASCULAR CLAUDICATION: ICD-10-CM

## 2023-02-07 LAB
BH CV LOWER ARTERIAL LEFT ABI RATIO: 1
BH CV LOWER ARTERIAL LEFT DORSALIS PEDIS SYS MAX: 120
BH CV LOWER ARTERIAL LEFT GREAT TOE SYS MAX: 92
BH CV LOWER ARTERIAL LEFT POST TIBIAL SYS MAX: 128
BH CV LOWER ARTERIAL LEFT TBI RATIO: 0.78
BH CV LOWER ARTERIAL RIGHT ABI RATIO: 1
BH CV LOWER ARTERIAL RIGHT DORSALIS PEDIS SYS MAX: 120
BH CV LOWER ARTERIAL RIGHT GREAT TOE SYS MAX: 90
BH CV LOWER ARTERIAL RIGHT POST TIBIAL SYS MAX: 123
BH CV LOWER ARTERIAL RIGHT TBI RATIO: 0.76
MAXIMAL PREDICTED HEART RATE: 160 BPM
STRESS TARGET HR: 136 BPM
UPPER ARTERIAL LEFT ARM BRACHIAL SYS MAX: 118 MMHG
UPPER ARTERIAL RIGHT ARM BRACHIAL SYS MAX: 117 MMHG

## 2023-02-07 PROCEDURE — 93922 UPR/L XTREMITY ART 2 LEVELS: CPT | Performed by: SURGERY

## 2023-02-07 PROCEDURE — 93922 UPR/L XTREMITY ART 2 LEVELS: CPT

## 2023-02-07 PROCEDURE — 71271 CT THORAX LUNG CANCER SCR C-: CPT

## 2023-02-13 ENCOUNTER — TELEPHONE (OUTPATIENT)
Dept: FAMILY MEDICINE CLINIC | Age: 63
End: 2023-02-13

## 2023-02-13 NOTE — TELEPHONE ENCOUNTER
Caller: Meliza Arreola A    Relationship to patient: Self    Best call back number: [phone]    Patient is needing: PATIENT CALLED IN AND SAID THAT SHE GOT A LETTER IN THE MAIL THAT WAS A DENIAL FOR DURABLE MEDICAL EQUIPMENT AND SHE IS UNAWARE OF WHAT MEDICAL EQUIPMENT IT MIGHT HAVE BEEN REGARDING. PATIENT REQUESTS A CALL BACK PLEASE

## 2023-02-16 ENCOUNTER — TELEPHONE (OUTPATIENT)
Dept: FAMILY MEDICINE CLINIC | Age: 63
End: 2023-02-16

## 2023-02-16 NOTE — TELEPHONE ENCOUNTER
Yes, if her insurance will pay for a 90 DS of Lyrica, I'll be happy to send that in for her on the next refill.  Thanks, ZIA

## 2023-02-16 NOTE — TELEPHONE ENCOUNTER
Caller: Melzia Arreola    Relationship: Self    Best call back number: [phone]           What was the call regarding: PATIENT REPORTS JUST PICKED UP PRESCRIPTION FOR    pregabalin (LYRICA) 150 MG capsule     AND IT IS ONLY A 30 DAY SUPPLY, WOULD LIKE TO SEE IF A 90 DAY SUPPLY COULD BE SEND IN NEXT MONTH, THE COST WOULD BE CHEAPER FOR HER.    Do you require a callback:   TO ADVISE FURTHER

## 2023-02-24 DIAGNOSIS — J43.2 CENTRILOBULAR EMPHYSEMA: ICD-10-CM

## 2023-02-24 RX ORDER — ALBUTEROL SULFATE 90 UG/1
AEROSOL, METERED RESPIRATORY (INHALATION)
Qty: 18 G | Refills: 5 | Status: SHIPPED | OUTPATIENT
Start: 2023-02-24 | End: 2023-04-06 | Stop reason: SDUPTHER

## 2023-02-28 DIAGNOSIS — G89.29 CHRONIC BILATERAL LOW BACK PAIN WITH BILATERAL SCIATICA: ICD-10-CM

## 2023-02-28 DIAGNOSIS — M54.41 CHRONIC BILATERAL LOW BACK PAIN WITH BILATERAL SCIATICA: ICD-10-CM

## 2023-02-28 DIAGNOSIS — M54.42 CHRONIC BILATERAL LOW BACK PAIN WITH BILATERAL SCIATICA: ICD-10-CM

## 2023-03-01 RX ORDER — HYDROCODONE BITARTRATE AND ACETAMINOPHEN 7.5; 325 MG/1; MG/1
1 TABLET ORAL 3 TIMES DAILY PRN
Qty: 75 TABLET | Refills: 0 | Status: SHIPPED | OUTPATIENT
Start: 2023-03-01 | End: 2023-03-29 | Stop reason: SDUPTHER

## 2023-03-08 ENCOUNTER — TELEPHONE (OUTPATIENT)
Dept: FAMILY MEDICINE CLINIC | Age: 63
End: 2023-03-08
Payer: MEDICARE

## 2023-03-08 DIAGNOSIS — E11.42 TYPE 2 DIABETES MELLITUS WITH DIABETIC POLYNEUROPATHY, WITHOUT LONG-TERM CURRENT USE OF INSULIN: ICD-10-CM

## 2023-03-08 RX ORDER — SEMAGLUTIDE 1.34 MG/ML
0.5 INJECTION, SOLUTION SUBCUTANEOUS WEEKLY
Qty: 1.5 ML | Refills: 1 | Status: SHIPPED | OUTPATIENT
Start: 2023-03-08 | End: 2023-03-10

## 2023-03-08 RX ORDER — OMEPRAZOLE 40 MG/1
CAPSULE, DELAYED RELEASE ORAL
Qty: 90 CAPSULE | Refills: 1 | Status: SHIPPED | OUTPATIENT
Start: 2023-03-08

## 2023-03-08 NOTE — TELEPHONE ENCOUNTER
Caller: Meliza Arreola    Relationship: Self    Best call back number: [phone]     Requested Prescriptions:   Requested Prescriptions     Pending Prescriptions Disp Refills   • Semaglutide,0.25 or 0.5MG/DOS, (Ozempic, 0.25 or 0.5 MG/DOSE,) 2 MG/1.5ML solution pen-injector 1.5 mL 1     Sig: Inject 0.5 mg under the skin into the appropriate area as directed 1 (One) Time Per Week.        Pharmacy where request should be sent: [id number] - [facility] [address] [address] [facility] [address] [place] [address] & [place] [address] - [phone]  - [phone] FX     Additional details provided by patient:     PATIENT STATED MEDICATION DOSAGE SHOULD BE HIGHER    PLEASE ADVISE     PATIENT IS ONLY WANTING TO FILL THIS MEDICATION AT [facility] BECAUSE [facility] NEVER HAS MEDICATION IN STOCK     Would you like a call back once the refill request has been completed: [x] Yes [] No    If the office needs to give you a call back, can they leave a voicemail: [x] Yes [] No    Srinivasan Cordova Rep   03/08/23 13:29 EST

## 2023-03-10 RX ORDER — SEMAGLUTIDE 1.34 MG/ML
1 INJECTION, SOLUTION SUBCUTANEOUS WEEKLY
Qty: 3 ML | Refills: 2 | Status: SHIPPED | OUTPATIENT
Start: 2023-03-10 | End: 2023-03-30

## 2023-03-22 ENCOUNTER — TELEPHONE (OUTPATIENT)
Dept: FAMILY MEDICINE CLINIC | Age: 63
End: 2023-03-22
Payer: MEDICARE

## 2023-03-22 NOTE — TELEPHONE ENCOUNTER
Caller: Meliza Arreola    Relationship: Self    Best call back number: [phone]    What is the best time to reach you: ANYTIME    Who are you requesting to speak with (clinical staff, provider,  specific staff member): NURSE     What was the call regarding: PATIENT IS CALLING REGARDING MEDICATION INVOKANA.  PATIENT STATED THAT PHARMACY HAS CALLED AND STATED THEY NEED A PA ON THIS MEDICATION.  PATIENT WANTED TO SEE BEFORE PCP GETS THE PIROR AUTHORIZATION ON IT, IF PATIENT STILL NEEDS TO BE TAKEN THIS MEDICATION.  SHE STATES THAT HER BLOOD SUGAR IS DOING REALLY GOOD WITH THE OZEMPIC     Do you require a callback: YES

## 2023-03-22 NOTE — TELEPHONE ENCOUNTER
Spoke to pt.  Please do PA on the Invokana.  She is taking both Ozempic and Invokana and her sugar is doing good.

## 2023-03-26 NOTE — PROGRESS NOTES
Primary Care Provider  Ne Palmer MD     Referring Provider  No ref. provider found     Chief Complaint  Shortness of Breath, Sleep Apnea, COPD, and Follow-up (1 year follow up)    Subjective          History of Presenting Illness  Patient is a 62-year-old female, patient of Dr. Zamorano who presents for management of asthma/COPD overlap syndrome who presents for a follow-up visit today.  Patient states that since last visit her breathing is at baseline.  States that she does get short of breath that is worse with heavy exertion, mild in severity, and improved with rest.  Patient states that she is taking Anoro every day as prescribed and uses albuterol inhaler and albuterol nebulizer treatments as needed.  Patient is on Flonase for seasonal allergies.  Patient is on Prilosec for reflux.  Patient states that she is on a BiPAP machine at night and with naps that is managed by Dr. Martinez and receives her BiPAP machine supplies through [employer] [facility] [employer].  Patient denies any morning headaches or excessive daytime sleepiness. Since last office visit patient had a low-dose chest CT scan completed on 2/7/2023 ordered by her primary care provider.  Report states no acute findings.  No suspicious pulmonary nodules are identified.  Patient denies fever, chills, night sweats, swollen glands in the head and neck, unintentional weight loss, hemoptysis, purulent sputum production, dysphagia, chest pain, palpitations, chest tightness, abdominal pain, nausea, vomiting, and diarrhea.  Patient also denies any myalgias, changes in sense of taste and/or smell, sore throat, any other coronavirus or flu-like symptoms.  Patient denies any leg swelling, orthopnea, paroxysmal nocturnal dyspnea.  Patient is able to perform activities of daily living.        Review of Systems   Constitutional: Negative for activity change, appetite change, chills, diaphoresis, fatigue, fever, unexpected weight gain and unexpected weight loss.         Negative for Insomnia   HENT: Negative for congestion (Nasal), mouth sores, nosebleeds, postnasal drip, sore throat, swollen glands and trouble swallowing.         Negative for Thrush  Negative for Hoarseness  Negative for Allergies/Hay Fever  Negative for Recent Head injury  Negative for Ear Fullness  Negative for Nasal or Sinus pain  Negative for Dry lips  Negative for Nasal discharge   Respiratory: Positive for shortness of breath. Negative for apnea, cough, chest tightness and wheezing.         Negative for Hemoptysis  Negative for Pleuritic pain   Cardiovascular: Negative for chest pain, palpitations and leg swelling.        Negative for Claudication  Negative for Cyanosis  Negative for Dyspnea on exertion   Gastrointestinal: Negative for abdominal pain, diarrhea, nausea, vomiting and GERD.   Musculoskeletal: Negative for joint swelling and myalgias.        Negative for Joint pain  Negative for Joint stiffness   Skin: Negative for color change, dry skin, pallor and rash.   Neurological: Negative for syncope, weakness and headache.   Hematological: Negative for adenopathy. Does not bruise/bleed easily.        Family History   Problem Relation Age of Onset   • Coronary artery disease Mother    • Heart disease Mother    • Coronary artery disease Father    • Alcohol abuse Father    • Heart disease Father    • Stroke Maternal Grandmother    • Heart disease Maternal Grandfather    • Cancer Paternal Grandfather    • Anxiety disorder Paternal Uncle    • Diabetes Sister    • Heart disease Sister    • Stroke Brother    • Vision loss Brother         Social History     Socioeconomic History   • Marital status:    • Number of children: 2   Tobacco Use   • Smoking status: Former     Packs/day: 1.00     Years: 36.00     Pack years: 36.00     Types: Cigarettes     Start date:      Quit date: 2011     Years since quittin.2     Passive exposure: Current   • Smokeless tobacco: Never   Vaping Use   • Vaping  Use: Never used   Substance and Sexual Activity   • Alcohol use: Not Currently     Comment: I'll have a glass of wine hope once in a while   • Drug use: Never   • Sexual activity: Yes     Partners: Male     Birth control/protection: None        Past Medical History:   Diagnosis Date   • Abnormal results of liver function studies    • Actinic keratosis    • Allergic    • Allergy     METOPROLOL SUCCINATE, ADVERSE REACTION   MODERATE   • Anxiety    • Chronic pain syndrome    • COPD (chronic obstructive pulmonary disease)    • Depression    • Dizziness and giddiness    • GERD (gastroesophageal reflux disease)    • HLD (hyperlipidemia)    • HTN (hypertension)    • Long term (current) use of opiate analgesic    • Low back pain    • Methicillin resistant Staphylococcus aureus infection, unspecified site    • Muscle weakness (generalized)    • Nausea    • Obstructive sleep apnea (adult) (pediatric)    • Other amnesia    • Pain in left knee    • Presence of unspecified artificial knee joint    • Presence of unspecified artificial knee joint    • PTSD (post-traumatic stress disorder)    • Rosacea, unspecified    • Sleep apnea    • Squamous cell carcinoma of skin of unspecified upper limb, including shoulder    • Type 2 diabetes mellitus with diabetic polyneuropathy    • Unspecified atrial fibrillation    • Unspecified chronic gastritis without bleeding         Immunization History   Administered Date(s) Administered   • Flu Vaccine Quad PF >36MO 11/17/2014, 11/01/2016   • Influenza, Unspecified 11/07/2019   • Pneumococcal Conjugate 13-Valent (PCV13) 11/25/2019   • Pneumococcal Polysaccharide (PPSV23) 09/18/2014       Allergies   Allergen Reactions   • Methylprednisolone Itching          Current Outpatient Medications:   •  albuterol (PROVENTIL) (2.5 MG/3ML) 0.083% nebulizer solution, Take 2.5 mg by nebulization Every 4 (Four) Hours As Needed for Wheezing or Shortness of Air for up to 90 days., Disp: 360 mL, Rfl: 3  •   albuterol sulfate  (90 Base) MCG/ACT inhaler, Inhale 2 puffs 2 (Two) Times a Day for 90 days., Disp: 54 g, Rfl: 3  •  amitriptyline (ELAVIL) 10 MG tablet, Take 1 tablet by mouth Every Night., Disp: 90 tablet, Rfl: 1  •  aspirin 81 MG chewable tablet, Chew 1 tablet Daily., Disp: , Rfl:   •  atorvastatin (LIPITOR) 10 MG tablet, Take 1 tablet by mouth once daily, Disp: 90 tablet, Rfl: 0  •  azelaic acid (AZELEX) 15 % gel, APPLY A THIN LAYER TOPICALLY TO FACE TWICE DAILY, Disp: , Rfl:   •  fluticasone (FLONASE) 50 MCG/ACT nasal spray, 2 sprays into the nostril(s) as directed by provider Daily., Disp: 16 g, Rfl: 0  •  glipizide (GLUCOTROL XL) 5 MG ER tablet, Take 1 tablet by mouth twice daily, Disp: 180 tablet, Rfl: 1  •  hydroCHLOROthiazide (MICROZIDE) 12.5 MG capsule, hydrochlorothiazide 12.5 mg oral capsule take 1 capsule (12.5 mg) by oral route once daily   Active, Disp: , Rfl:   •  HYDROcodone-acetaminophen (NORCO) 7.5-325 MG per tablet, Take 1 tablet by mouth 3 (Three) Times a Day As Needed for Moderate Pain. for pain, Disp: 75 tablet, Rfl: 0  •  hydrOXYzine (ATARAX) 50 MG tablet, Take 1 tablet by mouth Every 8 (Eight) Hours As Needed for Anxiety., Disp: 90 tablet, Rfl: 2  •  Invokana 100 MG tablet tablet, Take 1 tablet by mouth once daily, Disp: 90 tablet, Rfl: 1  •  losartan (COZAAR) 25 MG tablet, Take 1 tablet by mouth Daily., Disp: , Rfl:   •  metoprolol succinate XL (TOPROL-XL) 50 MG 24 hr tablet, Take 1 tablet by mouth Daily., Disp: , Rfl:   •  omeprazole (priLOSEC) 40 MG capsule, Take 1 capsule by mouth once daily, Disp: 90 capsule, Rfl: 1  •  Semaglutide, 2 MG/DOSE, (Ozempic, 2 MG/DOSE,) 8 MG/3ML solution pen-injector, Inject 2 mg under the skin into the appropriate area as directed 1 (One) Time Per Week., Disp: 3 mL, Rfl: 2  •  Umeclidinium-Vilanterol (Anoro Ellipta) 62.5-25 MCG/ACT aerosol powder  inhaler, Inhale 1 puff Daily., Disp: 180 each, Rfl: 4  •  Cartia  MG 24 hr capsule, Take 120 mg  "by mouth Daily. (Patient not taking: Reported on 4/6/2023), Disp: , Rfl:   •  clopidogrel (PLAVIX) 75 MG tablet, Take 75 mg by mouth Daily. (Patient not taking: Reported on 4/6/2023), Disp: , Rfl:   •  cyclobenzaprine (FLEXERIL) 10 MG tablet, Take 10 mg by mouth 3 (Three) Times a Day As Needed. for muscle spams (Patient not taking: Reported on 4/6/2023), Disp: , Rfl:   •  metroNIDAZOLE (METROGEL) 0.75 % gel, Apply  topically to the appropriate area as directed Daily. (Patient not taking: Reported on 4/6/2023), Disp: 45 g, Rfl: 0  •  mupirocin (BACTROBAN) 2 % ointment, APPLY TOPICALLY TO AFFECTED NOSTRILS 2 TO 3 TIMES DAILY (Patient not taking: Reported on 4/6/2023), Disp: , Rfl:   •  OneTouch Ultra test strip, USE 1 STRIP TO CHECK GLUCOSE TWICE DAILY, Disp: 200 each, Rfl: 3  •  pregabalin (LYRICA) 150 MG capsule, Take 1 capsule by mouth 2 (Two) Times a Day. (Patient not taking: Reported on 4/6/2023), Disp: 60 capsule, Rfl: 2     Objective     Physical Exam  Vital Signs:   WDWN, Alert, NAD.    HEENT:  PERRL, EOMI.  OP, nares clear, no sinus tenderness  Neck:  Supple, no JVD, no thyromegaly.  Lymph: no axillary, cervical, supraclavicular lymphadenopathy noted bilaterally  Chest:  good aeration, clear to auscultation bilaterally, tympanic to percussion bilaterally, no work of breathing noted  CV: RRR, no MGR, pulses 2+, equal.  Abd:  Soft, NT, ND, + BS, no HSM  EXT:  no clubbing, no cyanosis, no edema, no joint tenderness  Neuro:  A&Ox3, CN grossly intact, no focal deficits.  Skin: No rashes or lesions noted.    /64 (BP Location: Right arm, Patient Position: Sitting, Cuff Size: Large Adult)   Pulse 61   Resp 16   Ht 162.6 cm (64.02\")   Wt 83 kg (182 lb 14.4 oz)   SpO2 98% Comment: room air  BMI 31.38 kg/m²         Result Review :   I have reviewed Dr. Mcfarlane's last office visit note.  I also reviewed low-dose chest CT report completed on 2/7/2023 ordered by patient's primary care provider.  See scanned " report.      Procedures:         Assessment and Plan      Assessment:  1.  Asthma/COPD overlap syndrome.  2.  Chronic dyspnea.  3.  History of atrial fibrillation status post ablation now currently resolved.  Followed by cardiology in [place], [place] Dr. Gross.  4.  Obstructive sleep apnea.  Patient is on a BiPAP machine that is managed by Dr. Martinez, sleep specialist.  Patient states that she receives her BiPAP machine supplies through [employer].  5.  Seasonal allergies.  6.  GERD.  Is on a PPI.  7.  Tobacco abuse of cigarettes in remission.  Patient is enrolled in lung cancer screening.      Plan:  1. Continue Anoro as prescribed.   2. Continue albuterol inhaler and albuterol nebulizer treatment as needed.  3.  Continue Flonase for seasonal allergies.  4.  Continue BiPAP at current settings at night and with naps and clean mask and tubing daily.  Follow-up with Dr. Martinez, sleep specialist as scheduled.  5.  Patient will be due for repeat low-dose chest CT scan in February 2024.  Order placed today.  6.  For GERD,  patient to continue PPI.   Patient is also advised to sleep with the head of the bed elevated and do not eat 3-4 hours prior to bedtime.  7.  Vaccination status:  patient reports they are up-to-date with pneumonia vaccines.  Patient declines flu and COVID-19 vaccination.  Discussed with patient the benefits of vaccination including decreased risk of severe illness, hospitalization and death related to flu, pneumonia, and COVID-19.  Patient verbalized understanding.  Patient is advised to continue to follow CDC recommendations such as social distancing, wearing a mask, and washing hands for least 20 seconds.  8.  Smoking status: Patient is a former cigarette smoker.  Patient is enrolled in lung cancer screening.  9.  Patient to call the office, 911, or go to the ER with new or worsening symptoms.  10.  Follow-up in 1 year, sooner if needed.              Follow Up   Return in about 1 year (around  4/6/2024) for in [place].  Patient was given instructions and counseling regarding her condition or for health maintenance advice. Please see specific information pulled into the AVS if appropriate.

## 2023-03-29 DIAGNOSIS — M54.41 CHRONIC BILATERAL LOW BACK PAIN WITH BILATERAL SCIATICA: ICD-10-CM

## 2023-03-29 DIAGNOSIS — M54.42 CHRONIC BILATERAL LOW BACK PAIN WITH BILATERAL SCIATICA: ICD-10-CM

## 2023-03-29 DIAGNOSIS — G89.29 CHRONIC BILATERAL LOW BACK PAIN WITH BILATERAL SCIATICA: ICD-10-CM

## 2023-03-29 NOTE — TELEPHONE ENCOUNTER
Caller: Meliza Arreola    Relationship: Self    Best call back number: [phone]    Requested Prescriptions:   Requested Prescriptions     Pending Prescriptions Disp Refills   • HYDROcodone-acetaminophen (NORCO) 7.5-325 MG per tablet 75 tablet 0     Sig: Take 1 tablet by mouth 3 (Three) Times a Day As Needed for Moderate Pain. for pain        Pharmacy where request should be sent: [place] [address] [facility] [phone] [facility] [phone] FX     Last office visit with prescribing clinician: 1/11/2023   Last telemedicine visit with prescribing clinician: 4/17/2023   Next office visit with prescribing clinician: 4/17/2023       Does the patient have less than a 3 day supply:  [x] Yes  [] No    Would you like a call back once the refill request has been completed: [] Yes [x] No    If the office needs to give you a call back, can they leave a voicemail: [] Yes [x] No    Srinivasan Alvares Rep   03/29/23 08:49 EDT

## 2023-03-30 ENCOUNTER — TELEPHONE (OUTPATIENT)
Dept: FAMILY MEDICINE CLINIC | Age: 63
End: 2023-03-30

## 2023-03-30 DIAGNOSIS — E11.42 TYPE 2 DIABETES MELLITUS WITH DIABETIC POLYNEUROPATHY, WITHOUT LONG-TERM CURRENT USE OF INSULIN: ICD-10-CM

## 2023-03-30 RX ORDER — SEMAGLUTIDE 2.68 MG/ML
2 INJECTION, SOLUTION SUBCUTANEOUS WEEKLY
Qty: 3 ML | Refills: 2 | Status: SHIPPED | OUTPATIENT
Start: 2023-03-30 | End: 2023-06-20

## 2023-03-30 RX ORDER — HYDROCODONE BITARTRATE AND ACETAMINOPHEN 7.5; 325 MG/1; MG/1
1 TABLET ORAL 3 TIMES DAILY PRN
Qty: 75 TABLET | Refills: 0 | Status: SHIPPED | OUTPATIENT
Start: 2023-03-30

## 2023-03-30 NOTE — TELEPHONE ENCOUNTER
Prescription sent for Ozempic 2 mg/week.  Nieves states he is aware, this is the max dose.  Thanks, BZM

## 2023-03-30 NOTE — TELEPHONE ENCOUNTER
Caller: Meliza Arreola    Relationship: Self    Best call back number: [phone]    What medication are you requesting: INCREASED OZEMPIC DOSAGE    If a prescription is needed, what is your preferred pharmacy and phone number: [id number] - [facility] [address] [facility] [address] [facility] [address] [place] [address] & [place] [address] - [phone]  - [phone] FX

## 2023-04-06 ENCOUNTER — OFFICE VISIT (OUTPATIENT)
Dept: PULMONOLOGY | Facility: CLINIC | Age: 63
End: 2023-04-06
Payer: MEDICARE

## 2023-04-06 VITALS
HEIGHT: 64 IN | BODY MASS INDEX: 31.22 KG/M2 | WEIGHT: 182.9 LBS | OXYGEN SATURATION: 98 % | SYSTOLIC BLOOD PRESSURE: 113 MMHG | RESPIRATION RATE: 16 BRPM | HEART RATE: 61 BPM | DIASTOLIC BLOOD PRESSURE: 64 MMHG

## 2023-04-06 DIAGNOSIS — R06.09 CHRONIC DYSPNEA: ICD-10-CM

## 2023-04-06 DIAGNOSIS — J30.2 SEASONAL ALLERGIES: ICD-10-CM

## 2023-04-06 DIAGNOSIS — G47.33 OSA TREATED WITH BIPAP: Primary | ICD-10-CM

## 2023-04-06 DIAGNOSIS — J43.2 CENTRILOBULAR EMPHYSEMA: ICD-10-CM

## 2023-04-06 DIAGNOSIS — K21.9 GASTROESOPHAGEAL REFLUX DISEASE WITHOUT ESOPHAGITIS: ICD-10-CM

## 2023-04-06 DIAGNOSIS — F17.201 TOBACCO ABUSE, IN REMISSION: ICD-10-CM

## 2023-04-06 DIAGNOSIS — F17.211 CIGARETTE NICOTINE DEPENDENCE IN REMISSION: ICD-10-CM

## 2023-04-06 DIAGNOSIS — J44.9 ASTHMA-COPD OVERLAP SYNDROME: ICD-10-CM

## 2023-04-06 PROBLEM — J44.89 ASTHMA-COPD OVERLAP SYNDROME: Status: ACTIVE | Noted: 2021-08-19

## 2023-04-06 PROCEDURE — 1159F MED LIST DOCD IN RCRD: CPT | Performed by: NURSE PRACTITIONER

## 2023-04-06 PROCEDURE — 1160F RVW MEDS BY RX/DR IN RCRD: CPT | Performed by: NURSE PRACTITIONER

## 2023-04-06 PROCEDURE — 3078F DIAST BP <80 MM HG: CPT | Performed by: NURSE PRACTITIONER

## 2023-04-06 PROCEDURE — 3074F SYST BP LT 130 MM HG: CPT | Performed by: NURSE PRACTITIONER

## 2023-04-06 PROCEDURE — 99214 OFFICE O/P EST MOD 30 MIN: CPT | Performed by: NURSE PRACTITIONER

## 2023-04-06 RX ORDER — ALBUTEROL SULFATE 90 UG/1
2 AEROSOL, METERED RESPIRATORY (INHALATION) 2 TIMES DAILY
Qty: 54 G | Refills: 3 | Status: SHIPPED | OUTPATIENT
Start: 2023-04-06 | End: 2023-07-05

## 2023-04-06 RX ORDER — ALBUTEROL SULFATE 2.5 MG/3ML
2.5 SOLUTION RESPIRATORY (INHALATION) EVERY 4 HOURS PRN
Qty: 360 ML | Refills: 3 | Status: SHIPPED | OUTPATIENT
Start: 2023-04-06 | End: 2023-07-05

## 2023-04-12 ENCOUNTER — TELEPHONE (OUTPATIENT)
Dept: FAMILY MEDICINE CLINIC | Age: 63
End: 2023-04-12
Payer: MEDICARE

## 2023-04-12 DIAGNOSIS — Z12.31 ENCOUNTER FOR SCREENING MAMMOGRAM FOR MALIGNANT NEOPLASM OF BREAST: Primary | ICD-10-CM

## 2023-04-12 NOTE — TELEPHONE ENCOUNTER
----- Message from Ne Palmer MD sent at 4/12/2022  4:52 PM EDT -----  Regarding: f/u screening mammo  Please call pt to let her know that she is due for her yearly screening mammogram.  Please pend order for screening mammo and send to me.  Thanks, ZIA

## 2023-04-17 ENCOUNTER — OFFICE VISIT (OUTPATIENT)
Dept: FAMILY MEDICINE CLINIC | Age: 63
End: 2023-04-17
Payer: MEDICARE

## 2023-04-17 ENCOUNTER — LAB (OUTPATIENT)
Dept: LAB | Facility: HOSPITAL | Age: 63
End: 2023-04-17
Payer: MEDICARE

## 2023-04-17 VITALS
HEART RATE: 65 BPM | HEIGHT: 64 IN | SYSTOLIC BLOOD PRESSURE: 106 MMHG | OXYGEN SATURATION: 96 % | WEIGHT: 180.8 LBS | DIASTOLIC BLOOD PRESSURE: 67 MMHG | BODY MASS INDEX: 30.87 KG/M2

## 2023-04-17 DIAGNOSIS — I10 ESSENTIAL HYPERTENSION: ICD-10-CM

## 2023-04-17 DIAGNOSIS — E66.9 OBESITY (BMI 30-39.9): ICD-10-CM

## 2023-04-17 DIAGNOSIS — I48.11 LONGSTANDING PERSISTENT ATRIAL FIBRILLATION: ICD-10-CM

## 2023-04-17 DIAGNOSIS — Z79.899 HIGH RISK MEDICATION USE: ICD-10-CM

## 2023-04-17 DIAGNOSIS — E11.42 TYPE 2 DIABETES MELLITUS WITH DIABETIC POLYNEUROPATHY, WITHOUT LONG-TERM CURRENT USE OF INSULIN: ICD-10-CM

## 2023-04-17 DIAGNOSIS — G89.4 CHRONIC PAIN SYNDROME: Primary | ICD-10-CM

## 2023-04-17 DIAGNOSIS — F33.1 MAJOR DEPRESSIVE DISORDER, RECURRENT EPISODE, MODERATE DEGREE: ICD-10-CM

## 2023-04-17 DIAGNOSIS — I49.5 SICK SINUS SYNDROME: ICD-10-CM

## 2023-04-17 DIAGNOSIS — J44.9 ASTHMA-COPD OVERLAP SYNDROME: ICD-10-CM

## 2023-04-17 LAB
ALBUMIN SERPL-MCNC: 4.4 G/DL (ref 3.5–5.2)
ALBUMIN UR-MCNC: 2.7 MG/DL
ALBUMIN/GLOB SERPL: 1.5 G/DL
ALP SERPL-CCNC: 107 U/L (ref 39–117)
ALT SERPL W P-5'-P-CCNC: 17 U/L (ref 1–33)
AMPHET+METHAMPHET UR QL: NEGATIVE
AMPHETAMINES UR QL: NEGATIVE
ANION GAP SERPL CALCULATED.3IONS-SCNC: 11.9 MMOL/L (ref 5–15)
AST SERPL-CCNC: 26 U/L (ref 1–32)
BARBITURATES UR QL SCN: NEGATIVE
BASOPHILS # BLD AUTO: 0.01 10*3/MM3 (ref 0–0.2)
BASOPHILS NFR BLD AUTO: 0.1 % (ref 0–1.5)
BENZODIAZ UR QL SCN: NEGATIVE
BILIRUB SERPL-MCNC: 0.4 MG/DL (ref 0–1.2)
BUN SERPL-MCNC: 22 MG/DL (ref 8–23)
BUN/CREAT SERPL: 21.8 (ref 7–25)
BUPRENORPHINE SERPL-MCNC: NEGATIVE NG/ML
CALCIUM SPEC-SCNC: 10.5 MG/DL (ref 8.6–10.5)
CANNABINOIDS SERPL QL: NEGATIVE
CHLORIDE SERPL-SCNC: 105 MMOL/L (ref 98–107)
CHOLEST SERPL-MCNC: 162 MG/DL (ref 0–200)
CO2 SERPL-SCNC: 24.1 MMOL/L (ref 22–29)
COCAINE UR QL: NEGATIVE
CREAT SERPL-MCNC: 1.01 MG/DL (ref 0.57–1)
CREAT UR-MCNC: 132.2 MG/DL
DEPRECATED RDW RBC AUTO: 47.1 FL (ref 37–54)
EGFRCR SERPLBLD CKD-EPI 2021: 63.9 ML/MIN/1.73
EOSINOPHIL # BLD AUTO: 0.12 10*3/MM3 (ref 0–0.4)
EOSINOPHIL NFR BLD AUTO: 1.1 % (ref 0.3–6.2)
ERYTHROCYTE [DISTWIDTH] IN BLOOD BY AUTOMATED COUNT: 13.5 % (ref 12.3–15.4)
EXPIRATION DATE: NORMAL
FOLATE SERPL-MCNC: 8.69 NG/ML (ref 4.78–24.2)
GLOBULIN UR ELPH-MCNC: 2.9 GM/DL
GLUCOSE SERPL-MCNC: 130 MG/DL (ref 65–99)
HBA1C MFR BLD: 5.5 % (ref 4.8–5.6)
HCT VFR BLD AUTO: 49.3 % (ref 34–46.6)
HDLC SERPL-MCNC: 41 MG/DL (ref 40–60)
HGB BLD-MCNC: 16.4 G/DL (ref 12–15.9)
IMM GRANULOCYTES # BLD AUTO: 0.01 10*3/MM3 (ref 0–0.05)
IMM GRANULOCYTES NFR BLD AUTO: 0.1 % (ref 0–0.5)
LDLC SERPL CALC-MCNC: 95 MG/DL (ref 0–100)
LDLC/HDLC SERPL: 2.22 {RATIO}
LYMPHOCYTES # BLD AUTO: 2.74 10*3/MM3 (ref 0.7–3.1)
LYMPHOCYTES NFR BLD AUTO: 25.8 % (ref 19.6–45.3)
Lab: NORMAL
MCH RBC QN AUTO: 31.3 PG (ref 26.6–33)
MCHC RBC AUTO-ENTMCNC: 33.3 G/DL (ref 31.5–35.7)
MCV RBC AUTO: 94.1 FL (ref 79–97)
MDMA UR QL SCN: NEGATIVE
METHADONE UR QL SCN: NEGATIVE
MICROALBUMIN/CREAT UR: 20.4 MG/G
MONOCYTES # BLD AUTO: 0.57 10*3/MM3 (ref 0.1–0.9)
MONOCYTES NFR BLD AUTO: 5.4 % (ref 5–12)
NEUTROPHILS NFR BLD AUTO: 67.5 % (ref 42.7–76)
NEUTROPHILS NFR BLD AUTO: 7.18 10*3/MM3 (ref 1.7–7)
OPIATES UR QL: NEGATIVE
OXYCODONE UR QL SCN: NEGATIVE
PCP UR QL SCN: NEGATIVE
PLATELET # BLD AUTO: 243 10*3/MM3 (ref 140–450)
PMV BLD AUTO: 9.6 FL (ref 6–12)
POTASSIUM SERPL-SCNC: 4.4 MMOL/L (ref 3.5–5.2)
PROT SERPL-MCNC: 7.3 G/DL (ref 6–8.5)
RBC # BLD AUTO: 5.24 10*6/MM3 (ref 3.77–5.28)
SODIUM SERPL-SCNC: 141 MMOL/L (ref 136–145)
TRIGL SERPL-MCNC: 150 MG/DL (ref 0–150)
TSH SERPL DL<=0.05 MIU/L-ACNC: 2.41 UIU/ML (ref 0.27–4.2)
VIT B12 BLD-MCNC: 332 PG/ML (ref 211–946)
VLDLC SERPL-MCNC: 26 MG/DL (ref 5–40)
WBC NRBC COR # BLD: 10.63 10*3/MM3 (ref 3.4–10.8)

## 2023-04-17 PROCEDURE — 82746 ASSAY OF FOLIC ACID SERUM: CPT

## 2023-04-17 PROCEDURE — 83036 HEMOGLOBIN GLYCOSYLATED A1C: CPT

## 2023-04-17 PROCEDURE — 36415 COLL VENOUS BLD VENIPUNCTURE: CPT

## 2023-04-17 PROCEDURE — G0480 DRUG TEST DEF 1-7 CLASSES: HCPCS | Performed by: FAMILY MEDICINE

## 2023-04-17 PROCEDURE — 80061 LIPID PANEL: CPT

## 2023-04-17 PROCEDURE — 82570 ASSAY OF URINE CREATININE: CPT

## 2023-04-17 PROCEDURE — 84443 ASSAY THYROID STIM HORMONE: CPT

## 2023-04-17 PROCEDURE — 82043 UR ALBUMIN QUANTITATIVE: CPT

## 2023-04-17 PROCEDURE — 80053 COMPREHEN METABOLIC PANEL: CPT

## 2023-04-17 PROCEDURE — 82607 VITAMIN B-12: CPT

## 2023-04-17 PROCEDURE — 85025 COMPLETE CBC W/AUTO DIFF WBC: CPT

## 2023-04-17 RX ORDER — IVERMECTIN 10 MG/G
CREAM TOPICAL
COMMUNITY
Start: 2023-04-03

## 2023-04-17 NOTE — ASSESSMENT & PLAN NOTE
She is on Invokana and glipizide for DM.  Recently started on Ozempic and she is up to 2 mg subcu weekly.  Due for labs.  She is on an ACEI and statin.  She is up-to-date on foot exam, last done 1/2023.  She is UTD on eye exam, last done 6/2022.  She is on Lyrica for diabetic peripheral neuropathy.  No adverse effects.  She has been working on portion control.  She has lost some weight.  Checking labs.

## 2023-04-17 NOTE — ASSESSMENT & PLAN NOTE
Stable on diltiazem and metoprolol for rate control.  She is no longer on anticoagulation as she has a Watchman device.  Following with Cardiology Dr. Gross.  Continue to monitor.  Checking labs today.

## 2023-04-17 NOTE — ASSESSMENT & PLAN NOTE
Patient's (Body mass index is 31.02 kg/m².) indicates that they are obese (BMI >30) with health conditions that include hypertension, diabetes mellitus and dyslipidemias . Weight is improving with treatment. BMI  is above average; BMI management plan is completed. We discussed portion control and increasing exercise.  Continue Ozempic.

## 2023-04-17 NOTE — ASSESSMENT & PLAN NOTE
Blood pressure running at goal.  Continue diltiazem 120 mg daily, HCTZ 12.5 mg daily, losartan 25 mg daily and metoprolol succinate 50 mg daily.  No refills needed today.  Checking labs.  Continue to monitor.

## 2023-04-17 NOTE — PROGRESS NOTES
Chief Complaint  Pain (3 month follow up)    Subjective          Meliza Arreola presents to [facility] today for f/u of routine problems.    She is on Norco 2-3 times daily, Lyrica, Flexeril and amitriptyline for chronic pain syndrome.  She uses Norco #75 per month. Pain is worst in the low back, knees and shoulders.  She also has diabetic neuropathy for which she uses the Lyrica.  NSAIDs are not contraindicated due to use of Eliquis for her A-fib.  S/p PT and epidurals in the past but without significant relief.  S/p L TKA.  Status post Physical Therapy and she does follow a home exercise program.      She is on Invokana and glipizide for DM.  Recently started on Ozempic and she is up to 2 mg SC weekly.  Due for labs.  She is on an ACEI and statin.  She is up-to-date on foot exam, last done 1/2023.  She is UTD on eye exam, last done 6/2022.  She is on Lyrica for diabetic peripheral neuropathy.  No adverse effects.  She has been working on portion control.     She is on Cartia XT,  HCTZ, losartan and metoprolol succinate for hypertension and atrial fibrillation.  Her blood pressure has been well controlled.  She is on atorvastatin for hyperlipidemia.  No myalgias.  She has a Watchman Device.  She is on Plavix and no longer taking DOAC.  S/p pacemaker placement for SSS.  She has been having some intermittent fatigue.  Following with Dr. Gross.      She is on Anoro Ellipta for COPD.  She does have some baseline SOB.  Following with Yuly Chase and Dr. Mcfarlane.      She is on amitriptyline and hydroxyzine prn for chronic PTSD. She has previously followed with Dr. Romero Psychiatry but is no longer seeing him.  She is not interested in a referral right now.  She is no longer taking buspirone or clonazepam.      She is on omeprazole for GERD.  Denies indigestion or heartburn.      She has DUNCAN and has been very faithful with BiPAP.       Current Outpatient Medications:   •   albuterol (PROVENTIL) (2.5 MG/3ML) 0.083% nebulizer solution, Take 2.5 mg by nebulization Every 4 (Four) Hours As Needed for Wheezing or Shortness of Air for up to 90 days., Disp: 360 mL, Rfl: 3  •  albuterol sulfate  (90 Base) MCG/ACT inhaler, Inhale 2 puffs 2 (Two) Times a Day for 90 days., Disp: 54 g, Rfl: 3  •  amitriptyline (ELAVIL) 10 MG tablet, Take 1 tablet by mouth Every Night., Disp: 90 tablet, Rfl: 1  •  aspirin 81 MG chewable tablet, Chew 1 tablet Daily., Disp: , Rfl:   •  atorvastatin (LIPITOR) 10 MG tablet, Take 1 tablet by mouth once daily, Disp: 90 tablet, Rfl: 0  •  azelaic acid (AZELEX) 15 % gel, APPLY A THIN LAYER TOPICALLY TO FACE TWICE DAILY, Disp: , Rfl:   •  Cartia  MG 24 hr capsule, Take 1 capsule by mouth Daily., Disp: , Rfl:   •  cyclobenzaprine (FLEXERIL) 10 MG tablet, Take 1 tablet by mouth 3 (Three) Times a Day As Needed. for muscle spams, Disp: , Rfl:   •  glipizide (GLUCOTROL XL) 5 MG ER tablet, Take 1 tablet by mouth twice daily, Disp: 180 tablet, Rfl: 1  •  hydroCHLOROthiazide (MICROZIDE) 12.5 MG capsule, hydrochlorothiazide 12.5 mg oral capsule take 1 capsule (12.5 mg) by oral route once daily   Active, Disp: , Rfl:   •  HYDROcodone-acetaminophen (NORCO) 7.5-325 MG per tablet, Take 1 tablet by mouth 3 (Three) Times a Day As Needed for Moderate Pain. for pain, Disp: 75 tablet, Rfl: 0  •  hydrOXYzine (ATARAX) 50 MG tablet, Take 1 tablet by mouth Every 8 (Eight) Hours As Needed for Anxiety., Disp: 90 tablet, Rfl: 2  •  Invokana 100 MG tablet tablet, Take 1 tablet by mouth once daily, Disp: 90 tablet, Rfl: 1  •  losartan (COZAAR) 25 MG tablet, Take 1 tablet by mouth Daily., Disp: , Rfl:   •  metoprolol succinate XL (TOPROL-XL) 50 MG 24 hr tablet, Take 1 tablet by mouth Daily., Disp: , Rfl:   •  omeprazole (priLOSEC) 40 MG capsule, Take 1 capsule by mouth once daily, Disp: 90 capsule, Rfl: 1  •  OneTouch Ultra test strip, USE 1 STRIP TO CHECK GLUCOSE TWICE DAILY, Disp: 200  "each, Rfl: 3  •  pregabalin (LYRICA) 150 MG capsule, Take 1 capsule by mouth 2 (Two) Times a Day., Disp: 60 capsule, Rfl: 2  •  Semaglutide, 2 MG/DOSE, (Ozempic, 2 MG/DOSE,) 8 MG/3ML solution pen-injector, Inject 2 mg under the skin into the appropriate area as directed 1 (One) Time Per Week., Disp: 3 mL, Rfl: 2  •  Umeclidinium-Vilanterol (Anoro Ellipta) 62.5-25 MCG/ACT aerosol powder  inhaler, Inhale 1 puff Daily., Disp: 180 each, Rfl: 4  •  clopidogrel (PLAVIX) 75 MG tablet, Take 75 mg by mouth Daily. (Patient not taking: Reported on 4/6/2023), Disp: , Rfl:   •  Ivermectin 1 % cream, APPLY CREAM TOPICALLY TO FACE AT NIGHTTIME, Disp: , Rfl:   •  metroNIDAZOLE (METROGEL) 0.75 % gel, Apply  topically to the appropriate area as directed Daily. (Patient not taking: Reported on 4/6/2023), Disp: 45 g, Rfl: 0    Allergies:  Methylprednisolone      Objective   Vital Signs:   Vitals:    04/17/23 0901   BP: 106/67   BP Location: Left arm   Patient Position: Sitting   Cuff Size: Adult   Pulse: 65   SpO2: 96%   Weight: 82 kg (180 lb 12.8 oz)   Height: 162.6 cm (64.02\")       Physical Exam  Vitals reviewed.   Constitutional:       General: She is not in acute distress.     Appearance: Normal appearance. She is well-developed.   HENT:      Head: Normocephalic and atraumatic.      Right Ear: External ear normal.      Left Ear: External ear normal.   Eyes:      Extraocular Movements: Extraocular movements intact.      Conjunctiva/sclera: Conjunctivae normal.      Pupils: Pupils are equal, round, and reactive to light.   Cardiovascular:      Rate and Rhythm: Normal rate and regular rhythm.      Heart sounds: No murmur heard.  Pulmonary:      Effort: Pulmonary effort is normal.      Breath sounds: Normal breath sounds. No wheezing, rhonchi or rales.   Abdominal:      General: Bowel sounds are normal. There is no distension.      Palpations: Abdomen is soft.      Tenderness: There is no abdominal tenderness.   Musculoskeletal:       "   General: No tenderness. Normal range of motion.   Feet:      Right foot:      Skin integrity: No ulcer.   Neurological:      Mental Status: She is alert.   Psychiatric:         Mood and Affect: Mood and affect normal.         Behavior: Behavior normal.          Lab Results   Component Value Date    GLUCOSE 130 (H) 10/13/2022    BUN 13 10/13/2022    CREATININE 0.87 10/13/2022    EGFRIFNONA 57 (L) 08/19/2021    BCR 14.9 10/13/2022    K 4.3 10/13/2022    CO2 28.0 10/13/2022    CALCIUM 9.6 10/13/2022    ALBUMIN 4.70 10/13/2022    LABIL2 1.4 02/17/2021    AST 28 10/13/2022    ALT 24 10/13/2022       Lab Results   Component Value Date    CHOL 155 10/13/2022    CHLPL 184 02/17/2021    TRIG 180 (H) 10/13/2022    HDL 43 10/13/2022    LDL 81 10/13/2022            Assessment and Plan    Diagnoses and all orders for this visit:    1. Chronic pain syndrome (Primary)  Assessment & Plan:  Stable on current regimen.  Symptoms are well controlled.  No adverse effects. She does require ongoing use of this controlled substance to function.  Tox screen was due today.  Prior tox screen appropriate.  ERWIN was run today.  Refills were not needed today.  RTC 3 months.        2. High risk medication use  -     POC Urine Drug Screen [facility]    3. Type 2 diabetes mellitus with diabetic polyneuropathy, without long-term current use of insulin  Assessment & Plan:  She is on Invokana and glipizide for DM.  Recently started on Ozempic and she is up to 2 mg subcu weekly.  Due for labs.  She is on an ACEI and statin.  She is up-to-date on foot exam, last done 1/2023.  She is UTD on eye exam, last done 6/2022.  She is on Lyrica for diabetic peripheral neuropathy.  No adverse effects.  She has been working on portion control.  She has lost some weight.  Checking labs.    Orders:  -     Comprehensive Metabolic Panel; Future  -     Lipid Panel; Future  -     Hemoglobin A1c; Future  -     Microalbumin / Creatinine Urine Ratio - Urine, Clean  Catch; Future  -     EMG & Nerve Conduction Test; Future    4. Essential hypertension  Assessment & Plan:  Blood pressure running at goal.  Continue diltiazem 120 mg daily, HCTZ 12.5 mg daily, losartan 25 mg daily and metoprolol succinate 50 mg daily.  No refills needed today.  Checking labs.  Continue to monitor.    Orders:  -     CBC & Differential; Future  -     TSH; Future  -     Vitamin B12 & Folate; Future    5. Longstanding persistent atrial fibrillation  Assessment & Plan:  Stable on diltiazem and metoprolol for rate control.  She is no longer on anticoagulation as she has a Watchman device.  Following with Cardiology Dr. Gross.  Continue to monitor.  Checking labs today.      6. Sick sinus syndrome (HCC)  Overview:  As per a-fib plan.      7. Obesity (BMI 30-39.9)  Assessment & Plan:  Patient's (Body mass index is 31.02 kg/m².) indicates that they are obese (BMI >30) with health conditions that include hypertension, diabetes mellitus and dyslipidemias . Weight is improving with treatment. BMI  is above average; BMI management plan is completed. We discussed portion control and increasing exercise.  Continue Ozempic.      8. Major depressive disorder, recurrent episode, moderate degree  Overview:  Has previously followed with Dr. Romero Psychiatry but no longer.  Currently on amitriptyline and hydroxyzine.  Continue to monitor.      9. Asthma-COPD overlap syndrome  Overview:  On Anora Ellipta.  Following with Dr. Mcfarlane Pulmonology.        Follow Up   Return in about 3 months (around 7/17/2023) for Medicare Wellness.  Patient was given instructions and counseling regarding her condition or for health maintenance advice. Please see specific information pulled into the AVS if appropriate.

## 2023-04-18 DIAGNOSIS — Z79.899 HIGH RISK MEDICATION USE: ICD-10-CM

## 2023-04-19 DIAGNOSIS — E78.2 MIXED HYPERLIPIDEMIA: ICD-10-CM

## 2023-04-19 NOTE — ASSESSMENT & PLAN NOTE
Following with Psychiatry.  Dr. Overton is managing medications.  Continue to monitor.   [FreeTextEntry1] : A complete skin examination was performed.  There is no evidence of skin cancer.  We discussed the importance of photoprotection, including the use of hats, protective clothing and sunscreens with an SPF of at least 30.  Sun avoidance was also discussed.  The ABCDE's of melanoma was discussed.  Regular skin exams recommended.\par \par Onychomycosis\par Education.\par Kerydin solution qhs to great toenails.\par \par Seb derm\par Locoid solution bid prn.\par DHS-Zinc shampoo.

## 2023-04-20 RX ORDER — ATORVASTATIN CALCIUM 10 MG/1
TABLET, FILM COATED ORAL
Qty: 90 TABLET | Refills: 0 | Status: SHIPPED | OUTPATIENT
Start: 2023-04-20

## 2023-04-23 DIAGNOSIS — E78.2 MIXED HYPERLIPIDEMIA: ICD-10-CM

## 2023-04-23 LAB
HYDROCODONE UR-MCNC: 246 NG/ML
HYDROMORPHONE UR-MCNC: 62 NG/ML

## 2023-04-24 RX ORDER — ATORVASTATIN CALCIUM 10 MG/1
TABLET, FILM COATED ORAL
Qty: 90 TABLET | Refills: 0 | OUTPATIENT
Start: 2023-04-24

## 2023-04-27 DIAGNOSIS — M54.41 CHRONIC BILATERAL LOW BACK PAIN WITH BILATERAL SCIATICA: ICD-10-CM

## 2023-04-27 DIAGNOSIS — G89.29 CHRONIC BILATERAL LOW BACK PAIN WITH BILATERAL SCIATICA: ICD-10-CM

## 2023-04-27 DIAGNOSIS — M54.42 CHRONIC BILATERAL LOW BACK PAIN WITH BILATERAL SCIATICA: ICD-10-CM

## 2023-04-27 RX ORDER — HYDROCODONE BITARTRATE AND ACETAMINOPHEN 7.5; 325 MG/1; MG/1
1 TABLET ORAL 3 TIMES DAILY PRN
Qty: 75 TABLET | Refills: 0 | Status: SHIPPED | OUTPATIENT
Start: 2023-04-27

## 2023-04-27 NOTE — TELEPHONE ENCOUNTER
.    Caller: SolefayMeliza    Relationship: Self    Best call back number: [phone]    Requested Prescriptions:   Requested Prescriptions     Pending Prescriptions Disp Refills   • HYDROcodone-acetaminophen (NORCO) 7.5-325 MG per tablet 75 tablet 0     Sig: Take 1 tablet by mouth 3 (Three) Times a Day As Needed for Moderate Pain. for pain        Pharmacy where request should be sent: [place] [id number] [facility] [phone] [facility] [phone] FX     Last office visit with prescribing clinician: 4/17/2023   Last telemedicine visit with prescribing clinician: 7/5/2023   Next office visit with prescribing clinician: 7/5/2023         Does the patient have less than a 3 day supply:  [x] Yes  [] No    Would you like a call back once the refill request has been completed: [] Yes [x] No    If the office needs to give you a call back, can they leave a voicemail: [] Yes [x] No    Srinivasan Hayes Rep   04/27/23 09:08 EDT

## 2023-05-04 DIAGNOSIS — E11.42 DIABETIC PERIPHERAL NEUROPATHY: ICD-10-CM

## 2023-05-05 RX ORDER — PREGABALIN 150 MG/1
150 CAPSULE ORAL 2 TIMES DAILY
Qty: 60 CAPSULE | Refills: 2 | Status: SHIPPED | OUTPATIENT
Start: 2023-05-05

## 2023-05-10 RX ORDER — GLIPIZIDE 5 MG/1
TABLET, FILM COATED, EXTENDED RELEASE ORAL
Qty: 180 TABLET | Refills: 0 | Status: SHIPPED | OUTPATIENT
Start: 2023-05-10

## 2023-05-12 RX ORDER — CANAGLIFLOZIN 100 MG/1
1 TABLET, FILM COATED ORAL DAILY
Qty: 90 TABLET | Refills: 1 | Status: SHIPPED | OUTPATIENT
Start: 2023-05-12

## 2023-05-22 RX ORDER — BLOOD SUGAR DIAGNOSTIC
STRIP MISCELLANEOUS
Qty: 200 EACH | Refills: 0 | Status: SHIPPED | OUTPATIENT
Start: 2023-05-22

## 2023-05-23 DIAGNOSIS — M54.41 CHRONIC BILATERAL LOW BACK PAIN WITH BILATERAL SCIATICA: ICD-10-CM

## 2023-05-23 DIAGNOSIS — M54.42 CHRONIC BILATERAL LOW BACK PAIN WITH BILATERAL SCIATICA: ICD-10-CM

## 2023-05-23 DIAGNOSIS — G89.29 CHRONIC BILATERAL LOW BACK PAIN WITH BILATERAL SCIATICA: ICD-10-CM

## 2023-05-23 NOTE — TELEPHONE ENCOUNTER
Caller: SolefayMeliza    Relationship: Self    Best call back number: [phone]    Requested Prescriptions:   Requested Prescriptions     Pending Prescriptions Disp Refills   • HYDROcodone-acetaminophen (NORCO) 7.5-325 MG per tablet 75 tablet 0     Sig: Take 1 tablet by mouth 3 (Three) Times a Day As Needed for Moderate Pain. for pain        Pharmacy where request should be sent: [place] [id number] [facility] [phone] [facility] [phone] FX     Last office visit with prescribing clinician: 4/17/2023   Last telemedicine visit with prescribing clinician: Visit date not found   Next office visit with prescribing clinician: 7/5/2023     Additional details provided by patient:     Does the patient have less than a 3 day supply:  [x] Yes  [] No    Would you like a call back once the refill request has been completed: [] Yes [x] No    If the office needs to give you a call back, can they leave a voicemail: [] Yes [x] No    Srinivasan Panchal Rep   05/23/23 09:24 EDT

## 2023-05-25 RX ORDER — HYDROCODONE BITARTRATE AND ACETAMINOPHEN 7.5; 325 MG/1; MG/1
1 TABLET ORAL 3 TIMES DAILY PRN
Qty: 75 TABLET | Refills: 0 | Status: SHIPPED | OUTPATIENT
Start: 2023-05-25

## 2023-07-05 PROBLEM — Z86.16 HISTORY OF COVID-19: Status: RESOLVED | Noted: 2021-09-16 | Resolved: 2023-07-05

## 2023-07-05 PROBLEM — E66.9 OBESITY (BMI 30-39.9): Status: RESOLVED | Noted: 2022-04-11 | Resolved: 2023-07-05

## 2023-07-20 ENCOUNTER — TELEPHONE (OUTPATIENT)
Dept: FAMILY MEDICINE CLINIC | Age: 63
End: 2023-07-20

## 2023-07-20 DIAGNOSIS — G89.29 CHRONIC BILATERAL LOW BACK PAIN WITH BILATERAL SCIATICA: ICD-10-CM

## 2023-07-20 DIAGNOSIS — M54.41 CHRONIC BILATERAL LOW BACK PAIN WITH BILATERAL SCIATICA: ICD-10-CM

## 2023-07-20 DIAGNOSIS — M54.42 CHRONIC BILATERAL LOW BACK PAIN WITH BILATERAL SCIATICA: ICD-10-CM

## 2023-07-20 NOTE — TELEPHONE ENCOUNTER
Caller: SolefayMeliza    Relationship: Self    Best call back number: [phone]     Requested Prescriptions:   Requested Prescriptions     Pending Prescriptions Disp Refills    HYDROcodone-acetaminophen (NORCO) 7.5-325 MG per tablet 75 tablet 0     Sig: Take 1 tablet by mouth 3 (Three) Times a Day As Needed for Moderate Pain. for pain        Pharmacy where request should be sent: [place] [id number] [facility] [phone] [facility] [phone] FX     Last office visit with prescribing clinician: 7/5/2023   Last telemedicine visit with prescribing clinician: Visit date not found   Next office visit with prescribing clinician: 7/25/2023     Does the patient have less than a 3 day supply:  [x] Yes  [] No    Would you like a call back once the refill request has been completed: [] Yes [x] No    If the office needs to give you a call back, can they leave a voicemail: [] Yes [x] No    Srinivasan Rosen Rep   07/20/23 08:57 EDT

## 2023-07-21 RX ORDER — HYDROCODONE BITARTRATE AND ACETAMINOPHEN 7.5; 325 MG/1; MG/1
1 TABLET ORAL 3 TIMES DAILY PRN
Qty: 75 TABLET | Refills: 0 | Status: SHIPPED | OUTPATIENT
Start: 2023-07-21 | End: 2023-08-17 | Stop reason: SDUPTHER

## 2023-07-25 ENCOUNTER — OFFICE VISIT (OUTPATIENT)
Dept: FAMILY MEDICINE CLINIC | Age: 63
End: 2023-07-25
Payer: MEDICARE

## 2023-07-25 VITALS
WEIGHT: 169 LBS | HEIGHT: 64 IN | DIASTOLIC BLOOD PRESSURE: 51 MMHG | HEART RATE: 70 BPM | SYSTOLIC BLOOD PRESSURE: 106 MMHG | BODY MASS INDEX: 28.85 KG/M2 | OXYGEN SATURATION: 98 %

## 2023-07-25 DIAGNOSIS — Z00.00 ANNUAL PHYSICAL EXAM: Primary | ICD-10-CM

## 2023-07-25 PROCEDURE — 3074F SYST BP LT 130 MM HG: CPT | Performed by: FAMILY MEDICINE

## 2023-07-25 PROCEDURE — G0439 PPPS, SUBSEQ VISIT: HCPCS | Performed by: FAMILY MEDICINE

## 2023-07-25 PROCEDURE — 1170F FXNL STATUS ASSESSED: CPT | Performed by: FAMILY MEDICINE

## 2023-07-25 PROCEDURE — 1159F MED LIST DOCD IN RCRD: CPT | Performed by: FAMILY MEDICINE

## 2023-07-25 PROCEDURE — 3078F DIAST BP <80 MM HG: CPT | Performed by: FAMILY MEDICINE

## 2023-07-25 PROCEDURE — 1160F RVW MEDS BY RX/DR IN RCRD: CPT | Performed by: FAMILY MEDICINE

## 2023-07-25 PROCEDURE — 3044F HG A1C LEVEL LT 7.0%: CPT | Performed by: FAMILY MEDICINE

## 2023-07-25 NOTE — ASSESSMENT & PLAN NOTE
She is UTD on colonoscopy, last done 11/2014 and this showed hemorrhoids.  Ten year repeat recommended.  She reports that insurance had her do the Cologuard within the past couple months and this was negative; records requested.  She is due for pap smear, last done 2/2019 and this showed NIL.  No HPV testing done d/t Medicare, so three year repeat recommended; she prefers not to do this today, so we will get it scheduled for her.  She is UTD on mammogram, last done 7/2023 and this was normal.  She is UTD on Pneumovax (9/2014), [id number] (11/2019).  She is due for COVID, Shingrix and Td.  COVID declined.  Shingrix and Tdap can be done at the pharmacy.  Flu shot not currently indicated.  She is UTD on routine labs.

## 2023-07-25 NOTE — PROGRESS NOTES
The ABCs of the Annual Wellness Visit  Subsequent Medicare Wellness Visit    George Arreola is a 62 y.o. female who presents for a Subsequent Medicare Wellness Visit.    She is UTD on colonoscopy, last done 11/2014 and this showed hemorrhoids.  Ten year repeat recommended.  She reports that insurance had her do the Cologuard within the past couple months and this was negative.  She is due for pap smear, last done 2/2019 and this showed NIL.  No HPV testing done d/t Medicare, so three year repeat recommended.  She is UTD on mammogram, last done 7/2023 and this was normal.  She is UTD on Pneumovax (9/2014), Cdisxim97 (11/2019).  She is due for COVID, Shingrix and Td.  Flu shot not currently indicated.  She is UTD on routine labs.     The following portions of the patient's history were reviewed and   updated as appropriate: allergies, current medications, past family history, past medical history, past social history, past surgical history, and problem list.    Compared to one year ago, the patient feels her physical   health is the same.    Compared to one year ago, the patient feels her mental   health is better.    Recent Hospitalizations:  She was not admitted to the hospital during the last year.       Current Medical Providers:  Patient Care Team:  Ne Palmer MD as PCP - General (Family Medicine)  Ruddy Gross DO as Consulting Physician (Cardiac Electrophysiology)  Yuly Chase APRN as Nurse Practitioner (Pulmonary Disease)  Kings Martinez MD as Consulting Physician (Sleep Medicine)    Outpatient Medications Prior to Visit   Medication Sig Dispense Refill    amitriptyline (ELAVIL) 10 MG tablet Take 1 tablet by mouth Every Night. 90 tablet 1    aspirin 81 MG chewable tablet Chew 1 tablet Daily.      atorvastatin (LIPITOR) 10 MG tablet Take 1 tablet by mouth once daily 90 tablet 0    azelaic acid (AZELEX) 15 % gel APPLY A THIN LAYER TOPICALLY TO FACE TWICE DAILY       Canagliflozin (Invokana) 100 MG tablet tablet Take 1 tablet by mouth Daily. 90 tablet 1    clopidogrel (PLAVIX) 75 MG tablet Take 1 tablet by mouth Daily.      dilTIAZem SR (CARDIZEM SR) 120 MG 12 hr capsule Take 1 capsule by mouth 2 (Two) Times a Day.      glipizide (GLUCOTROL XL) 5 MG ER tablet Take 1 tablet by mouth twice daily 180 tablet 0    hydroCHLOROthiazide (MICROZIDE) 12.5 MG capsule hydrochlorothiazide 12.5 mg oral capsule take 1 capsule (12.5 mg) by oral route once daily   Active      HYDROcodone-acetaminophen (NORCO) 7.5-325 MG per tablet Take 1 tablet by mouth 3 (Three) Times a Day As Needed for Moderate Pain. for pain 75 tablet 0    hydrOXYzine (ATARAX) 50 MG tablet Take 1 tablet by mouth Every 8 (Eight) Hours As Needed for Anxiety. 90 tablet 2    Ivermectin 1 % cream APPLY CREAM TOPICALLY TO FACE AT NIGHTTIME      losartan (COZAAR) 25 MG tablet Take 1 tablet by mouth Daily.      metoprolol succinate XL (TOPROL-XL) 50 MG 24 hr tablet Take 1 tablet by mouth Daily.      omeprazole (priLOSEC) 40 MG capsule Take 1 capsule by mouth once daily 90 capsule 1    OneTouch Ultra test strip USE 1 STRIP TO CHECK GLUCOSE TWICE DAILY 200 each 0    Ozempic, 2 MG/DOSE, 8 MG/3ML solution pen-injector INJECT 2 MG UNDER THE SKIN INTO THE APPROPRIATE AREA AS DIRECTED ONCE PER WEEK 3 mL 2    pregabalin (LYRICA) 150 MG capsule Take 1 capsule by mouth 2 (Two) Times a Day. 60 capsule 2    Umeclidinium-Vilanterol (Anoro Ellipta) 62.5-25 MCG/ACT aerosol powder  inhaler Inhale 1 puff Daily. 180 each 4    cyclobenzaprine (FLEXERIL) 10 MG tablet Take 1 tablet by mouth 3 (Three) Times a Day As Needed. for muscle spams (Patient not taking: Reported on 7/25/2023)       No facility-administered medications prior to visit.       Opioid medication/s are on active medication list.  and I have evaluated her active treatment plan and pain score trends (see table).  There were no vitals filed for this visit.  I have reviewed the chart  "for potential of high risk medication and harmful drug interactions in the elderly.          Aspirin is on active medication list. Aspirin use is indicated based on review of current medical condition/s. Pros and cons of this therapy have been discussed today. Benefits of this medication outweigh potential harm.  Patient has been encouraged to continue taking this medication.  .      Patient Active Problem List   Diagnosis    Type 2 diabetes mellitus with diabetic polyneuropathy, without long-term current use of insulin    Mixed hyperlipidemia    Essential hypertension    Gastroesophageal reflux disease without esophagitis    Longstanding persistent atrial fibrillation    Sick sinus syndrome    Major depressive disorder, recurrent episode, moderate degree    PTSD (post-traumatic stress disorder)    DUNCAN treated with BiPAP    Asthma-COPD overlap syndrome    Chronic bilateral low back pain with bilateral sciatica    Rosacea    Chronic pain syndrome    High risk medication use    Nonintractable episodic headache    Primary insomnia    Cigarette nicotine dependence in remission    Annual physical exam    Cervicalgia    Seasonal allergic rhinitis due to pollen    Vascular claudication    Chronic dyspnea    Seasonal allergies     Advance Care Planning   Advance Care Planning     Advance Directive is not on file.  ACP discussion was held with the patient during this visit. Patient does not have an advance directive, information provided.     Objective    Vitals:    07/25/23 1121   BP: 106/51   BP Location: Left arm   Patient Position: Sitting   Cuff Size: Adult   Pulse: 70   SpO2: 98%   Weight: 76.7 kg (169 lb)   Height: 162.6 cm (64.02\")     Estimated body mass index is 28.99 kg/m² as calculated from the following:    Height as of this encounter: 162.6 cm (64.02\").    Weight as of this encounter: 76.7 kg (169 lb).           Does the patient have evidence of cognitive impairment? No    Lab Results   Component Value Date    " TRIG 123 2023    HDL 38 (L) 2023    LDL 76 2023    VLDL 22 2023    HGBA1C 5.40 2023        HEALTH RISK ASSESSMENT    Smoking Status:  Social History     Tobacco Use   Smoking Status Former    Packs/day: 1.00    Years: 36.00    Pack years: 36.00    Types: Cigarettes    Start date: 1975    Quit date: 2011    Years since quittin.5    Passive exposure: Current   Smokeless Tobacco Never     Alcohol Consumption:  Social History     Substance and Sexual Activity   Alcohol Use Not Currently    Comment: I'll have a glass of wine hope once in a while     Fall Risk Screen:    KIRT Fall Risk Assessment was completed, and patient is at MODERATE risk for falls. Assessment completed on:2023    Depression Screenin/25/2023    11:26 AM   PHQ-2/PHQ-9 Depression Screening   Little Interest or Pleasure in Doing Things 0-->not at all   Feeling Down, Depressed or Hopeless 0-->not at all   PHQ-9: Brief Depression Severity Measure Score 0       Health Habits and Functional and Cognitive Screenin/25/2023    11:26 AM   Functional & Cognitive Status   Do you have difficulty preparing food and eating? No   Do you have difficulty bathing yourself, getting dressed or grooming yourself? No   Do you have difficulty using the toilet? No   Do you have difficulty moving around from place to place? No   Do you have trouble with steps or getting out of a bed or a chair? Yes   Current Diet Well Balanced Diet   Dental Exam Up to date   Eye Exam Up to date   Exercise (times per week) 4 times per week   Current Exercises Include Aerobics   Do you need help using the phone?  No   Are you deaf or do you have serious difficulty hearing?  Yes   Do you need help to go to places out of walking distance? Yes   Do you need help shopping? No   Do you need help preparing meals?  No   Do you need help with housework?  Yes   Do you need help with laundry? Yes   Do you need help taking your medications?  Yes   Do you need help managing money? No   Do you ever drive or ride in a car without wearing a seat belt? No   Have you felt unusual stress, anger or loneliness in the last month? Yes   Who do you live with? Spouse   If you need help, do you have trouble finding someone available to you? No   Have you been bothered in the last four weeks by sexual problems? No   Do you have difficulty concentrating, remembering or making decisions? Yes       Age-appropriate Screening Schedule:  Refer to the list below for future screening recommendations based on patient's age, sex and/or medical conditions. Orders for these recommended tests are listed in the plan section. The patient has been provided with a written plan.    Health Maintenance   Topic Date Due    TDAP/TD VACCINES (1 - Tdap) Never done    ZOSTER VACCINE (1 of 2) Never done    PAP SMEAR  02/19/2022    COVID-19 Vaccine (1) 07/27/2023 (Originally 5/28/1961)    INFLUENZA VACCINE  10/01/2023    HEMOGLOBIN A1C  01/05/2024    DIABETIC FOOT EXAM  01/11/2024    LUNG CANCER SCREENING  02/07/2024    URINE MICROALBUMIN  04/17/2024    DIABETIC EYE EXAM  06/19/2024    LIPID PANEL  07/05/2024    ANNUAL WELLNESS VISIT  07/25/2024    COLORECTAL CANCER SCREENING  11/21/2024    MAMMOGRAM  07/20/2025    Pneumococcal Vaccine 0-64 (3 - PPSV23 if available, else PCV20) 11/28/2027    HEPATITIS C SCREENING  Completed                  CMS Preventative Services Quick Reference  Risk Factors Identified During Encounter  Immunizations Discussed/Encouraged: Tdap, Shingrix, and COVID19  The above risks/problems have been discussed with the patient.  Pertinent information has been shared with the patient in the After Visit Summary.  An After Visit Summary and PPPS were made available to the patient.    Follow Up:   Next Medicare Wellness visit to be scheduled in 1 year.         Review of Systems   Constitutional:  Negative for chills, fatigue and fever.   HENT:  Negative for congestion, hearing  "loss and rhinorrhea.    Eyes:  Negative for pain and visual disturbance.   Respiratory:  Negative for cough and shortness of breath.    Cardiovascular:  Negative for chest pain and palpitations.   Gastrointestinal:  Negative for abdominal pain, constipation, diarrhea, nausea and vomiting.   Genitourinary:  Negative for difficulty urinating and dysuria.   Musculoskeletal:  Positive for arthralgias and back pain. Negative for myalgias.   Neurological:  Negative for weakness and numbness.   Psychiatric/Behavioral:  Negative for dysphoric mood and sleep disturbance. The patient is not nervous/anxious.      Objective   Vital Signs:  /51 (BP Location: Left arm, Patient Position: Sitting, Cuff Size: Adult)   Pulse 70   Ht 162.6 cm (64.02\")   Wt 76.7 kg (169 lb)   SpO2 98%   BMI 28.99 kg/m²     Physical Exam  Vitals reviewed.   Constitutional:       General: She is not in acute distress.     Appearance: Normal appearance. She is well-developed.   HENT:      Head: Normocephalic and atraumatic.      Right Ear: External ear normal.      Left Ear: External ear normal.      Mouth/Throat:      Mouth: Mucous membranes are moist.   Eyes:      Extraocular Movements: Extraocular movements intact.      Conjunctiva/sclera: Conjunctivae normal.      Pupils: Pupils are equal, round, and reactive to light.   Cardiovascular:      Rate and Rhythm: Normal rate and regular rhythm.      Heart sounds: No murmur heard.  Pulmonary:      Effort: Pulmonary effort is normal.      Breath sounds: Normal breath sounds. No wheezing, rhonchi or rales.   Abdominal:      General: Bowel sounds are normal. There is no distension.      Palpations: Abdomen is soft.      Tenderness: There is no abdominal tenderness.   Musculoskeletal:         General: No tenderness. Normal range of motion.   Feet:      Right foot:      Skin integrity: No ulcer.   Skin:     General: Skin is warm and dry.   Neurological:      Mental Status: She is alert and oriented to " person, place, and time.      Deep Tendon Reflexes: Reflexes normal.   Psychiatric:         Mood and Affect: Mood and affect normal.         Behavior: Behavior normal.         Thought Content: Thought content normal.         Judgment: Judgment normal.                  Lab Results   Component Value Date    GLUCOSE 72 07/05/2023    BUN 18 07/05/2023    CREATININE 0.82 07/05/2023    EGFR 82.0 07/05/2023    BCR 22.0 07/05/2023    K 4.2 07/05/2023    CO2 24.8 07/05/2023    CALCIUM 9.9 07/05/2023    ALBUMIN 4.5 07/05/2023    BILITOT 0.5 07/05/2023    AST 24 07/05/2023    ALT 22 07/05/2023     Lab Results   Component Value Date    CHOL 136 07/05/2023    CHLPL 184 02/17/2021    TRIG 123 07/05/2023    HDL 38 (L) 07/05/2023    LDL 76 07/05/2023          Assessment and Plan   Diagnoses and all orders for this visit:    1. Annual physical exam (Primary)  Assessment & Plan:  She is UTD on colonoscopy, last done 11/2014 and this showed hemorrhoids.  Ten year repeat recommended.  She reports that insurance had her do the Cologuard within the past couple months and this was negative; records requested.  She is due for pap smear, last done 2/2019 and this showed NIL.  No HPV testing done d/t Medicare, so three year repeat recommended; she prefers not to do this today, so we will get it scheduled for her.  She is UTD on mammogram, last done 7/2023 and this was normal.  She is UTD on Pneumovax (9/2014), Twxihnm23 (11/2019).  She is due for COVID, Shingrix and Td.  COVID declined.  Shingrix and Tdap can be done at the pharmacy.  Flu shot not currently indicated.  She is UTD on routine labs.                Follow Up   Return if symptoms worsen or fail to improve, for or as scheduled 10/5/2023 (also needs to get scheduled for a pap appt).  Patient was given instructions and counseling regarding her condition or for health maintenance advice. Please see specific information pulled into the AVS if appropriate.

## 2023-07-26 RX ORDER — SEMAGLUTIDE 2.68 MG/ML
INJECTION, SOLUTION SUBCUTANEOUS
Qty: 3 ML | Refills: 2 | Status: CANCELLED | OUTPATIENT
Start: 2023-07-26

## 2023-07-26 NOTE — TELEPHONE ENCOUNTER
DOSAGE NEEDS TO BE CHANGED TO A .5 MG BECAUSE THAT IS ALL THE PHARMACY HAS IN STOCK     Please advise

## 2023-07-26 NOTE — TELEPHONE ENCOUNTER
Caller: Meliza Arreola    Relationship: Self    Best call back number: [phone]    Requested Prescriptions:   Requested Prescriptions     Pending Prescriptions Disp Refills    Semaglutide, 2 MG/DOSE, (Ozempic, 2 MG/DOSE,) 8 MG/3ML solution pen-injector 3 mL 2        Pharmacy where request should be sent: [id number] - [facility] [address] [address] AT [facility] [address] [place] [address] & [place] [address] - [phone] [facility] [phone] FX     Last office visit with prescribing clinician: 7/25/2023   Last telemedicine visit with prescribing clinician: Visit date not found   Next office visit with prescribing clinician: 9/1/2023     Additional details provided by patient: DOSAGE NEEDS TO BE CHANGED TO A .5 MG BECAUSE THAT IS ALL THE PHARMACY HAS IN STOCK     Does the patient have less than a 3 day supply:  [x] Yes  [] No    Would you like a call back once the refill request has been completed: [] Yes [x] No    If the office needs to give you a call back, can they leave a voicemail: [] Yes [x] No    Srinivasan Hayes Rep   07/26/23 13:00 EDT

## 2023-07-27 NOTE — TELEPHONE ENCOUNTER
Caller: Meliza Arreola    Relationship: Self    Best call back number: [phone]    Requested Prescriptions:   Requested Prescriptions     Pending Prescriptions Disp Refills    Semaglutide, 1 MG/DOSE, (Ozempic, 1 MG/DOSE,) 4 MG/3ML solution pen-injector 3 mL 0     Sig: Inject 1 mg under the skin into the appropriate area as directed 1 (One) Time Per Week.        Pharmacy where request should be sent: [facility] - [facility], [place] [address] - [phone]  - [phone] FX     Last office visit with prescribing clinician: 7/25/2023   Last telemedicine visit with prescribing clinician: Visit date not found   Next office visit with prescribing clinician: 9/1/2023     Additional details provided by patient: PRESCRIPTION WAS SENT TO [facility] [place] NEEDS TO BE RESENT TO [facility]    Does the patient have less than a 3 day supply:  [] Yes  [x] No    Would you like a call back once the refill request has been completed: [] Yes [x] No    If the office needs to give you a call back, can they leave a voicemail: [] Yes [x] No    Srinivasan Hayes Rep   07/27/23 16:40 EDT

## 2023-07-28 RX ORDER — SEMAGLUTIDE 1.34 MG/ML
1 INJECTION, SOLUTION SUBCUTANEOUS WEEKLY
Qty: 3 ML | Refills: 0 | Status: SHIPPED | OUTPATIENT
Start: 2023-07-28

## 2023-07-31 RX ORDER — AMITRIPTYLINE HYDROCHLORIDE 10 MG/1
TABLET, FILM COATED ORAL
Qty: 90 TABLET | Refills: 0 | Status: SHIPPED | OUTPATIENT
Start: 2023-07-31

## 2023-08-03 DIAGNOSIS — E11.42 DIABETIC PERIPHERAL NEUROPATHY: ICD-10-CM

## 2023-08-03 RX ORDER — GLIPIZIDE 5 MG/1
TABLET, FILM COATED, EXTENDED RELEASE ORAL
Qty: 180 TABLET | Refills: 0 | Status: SHIPPED | OUTPATIENT
Start: 2023-08-03

## 2023-08-03 RX ORDER — PREGABALIN 150 MG/1
150 CAPSULE ORAL 2 TIMES DAILY
Qty: 60 CAPSULE | Refills: 2 | Status: SHIPPED | OUTPATIENT
Start: 2023-08-03

## 2023-08-07 ENCOUNTER — PROCEDURE VISIT (OUTPATIENT)
Dept: NEUROLOGY | Facility: CLINIC | Age: 63
End: 2023-08-07
Payer: MEDICARE

## 2023-08-07 VITALS
DIASTOLIC BLOOD PRESSURE: 58 MMHG | HEART RATE: 63 BPM | WEIGHT: 171 LBS | BODY MASS INDEX: 29.19 KG/M2 | SYSTOLIC BLOOD PRESSURE: 125 MMHG | HEIGHT: 64 IN

## 2023-08-07 DIAGNOSIS — M79.652 BILATERAL THIGH PAIN: Primary | ICD-10-CM

## 2023-08-07 DIAGNOSIS — M79.651 BILATERAL THIGH PAIN: Primary | ICD-10-CM

## 2023-08-07 DIAGNOSIS — E11.42 TYPE 2 DIABETES MELLITUS WITH DIABETIC POLYNEUROPATHY, WITHOUT LONG-TERM CURRENT USE OF INSULIN: ICD-10-CM

## 2023-08-07 NOTE — ASSESSMENT & PLAN NOTE
EMG and nerve conduction study is normal.  I discussed with her that her thigh pain is most likely musculoskeletal pain.  I would recommend for her to discontinue Lipitor and get CPK.  I would also recommend for her to be referred to orthopedic surgery for further evaluation.  I discussed with her that she does not have lumbosacral radiculopathy or entrapment of the lateral femoral cutaneous nerve.    She is complaining of burning sensations in her feet.  She may have small fiber neuropathy but she does not have a large fiber neuropathy on nerve conduction study.  An option would be to refer her to [facility] peripheral neuropathy clinic to get a second opinion regarding small fiber neuropathy.  Thank you for letting me participate in her care.

## 2023-08-07 NOTE — PROGRESS NOTES
"Chief Complaint  Extremity Weakness (BLE)    Subjective          Meliza Arreola is a 62 y.o. female who presents to [facility]  History of Present Illness  62-year-old woman evaluated for bilateral thigh pain in the area of the tensor fascia jolie for the past 4 years.  She states that if she walks about 20 to 30 minutes to her thigh starts hurting and she has to sit down.  Sometimes her legs gives way on her.  She cannot walk for long time because of the thigh pain.  Other than walking it does not bother her.  She does not have any numbness and tingling.  There is no sensory loss in that area.  It does not happen when she is sitting down or standing immediately.  She has to walk and walking makes it painful.  She has no hip pain.  She has had chronic back pain.  She is also complaining of numbness burning, sensations in her feet for years.  She had a nerve conduction study 2 years ago which was unremarkable.  Objective   Vital Signs:   /58   Pulse 63   Ht 162.6 cm (64.02\")   Wt 77.6 kg (171 lb)   BMI 29.34 kg/mý     Physical Exam   There is no weakness of her lower extremities on individual muscle testing.  There is no weakness in hip flexion, abduction, adduction, knee extension, flexion, dorsiflexion, eversion.  She is able to get up from a sitting to standing position 10 times with arms crossed without difficulty.  She is able to tiptoe, and heel walk.  Reflexes are normal and right patellar decrease in the left patellar where she has had surgery and normal in the ankles.  Sensation symmetrical to pinprick.  External rotation does not produce hip pain bilaterally.  There is tenderness in the right and left tensor fossa lateral muscles to palpation.  There is no sensory loss in the distribution of the lateral femoral cutaneous nerve bilaterally.        Assessment and Plan  Diagnoses and all orders for this visit:    1. Bilateral thigh pain " (Primary)  Assessment & Plan:  EMG and nerve conduction study is normal.  I discussed with her that her thigh pain is most likely musculoskeletal pain.  I would recommend for her to discontinue Lipitor and get CPK.  I would also recommend for her to be referred to orthopedic surgery for further evaluation.  I discussed with her that she does not have lumbosacral radiculopathy or entrapment of the lateral femoral cutaneous nerve.    She is complaining of burning sensations in her feet.  She may have small fiber neuropathy but she does not have a large fiber neuropathy on nerve conduction study.  An option would be to refer her to [facility] peripheral neuropathy clinic to get a second opinion regarding small fiber neuropathy.  Thank you for letting me participate in her care.      2. Type 2 diabetes mellitus with diabetic polyneuropathy, without long-term current use of insulin  -     EMG & Nerve Conduction Test         Nerve Conduction Study:  4 nerves     EMG:  Complete    Total time spent with the patient and coordinating patient care was 35 minutes.    Follow Up  No follow-ups on file.  Patient was given instructions and counseling regarding her condition or for health maintenance advice. Please see specific information pulled into the AVS if appropriate.

## 2023-08-10 DIAGNOSIS — E11.42 TYPE 2 DIABETES MELLITUS WITH DIABETIC POLYNEUROPATHY, WITHOUT LONG-TERM CURRENT USE OF INSULIN: Primary | ICD-10-CM

## 2023-08-10 RX ORDER — SEMAGLUTIDE 2.68 MG/ML
2 INJECTION, SOLUTION SUBCUTANEOUS WEEKLY
Qty: 3 ML | Refills: 2 | Status: SHIPPED | OUTPATIENT
Start: 2023-08-10

## 2023-08-10 RX ORDER — SEMAGLUTIDE 1.34 MG/ML
1 INJECTION, SOLUTION SUBCUTANEOUS WEEKLY
Qty: 3 ML | Refills: 0 | Status: CANCELLED | OUTPATIENT
Start: 2023-08-10

## 2023-08-10 NOTE — TELEPHONE ENCOUNTER
Caller: Meliza Arreola    Relationship: Self    Best call back number: [phone]    Requested Prescriptions:   Requested Prescriptions     Pending Prescriptions Disp Refills    Semaglutide, 1 MG/DOSE, (Ozempic, 1 MG/DOSE,) 4 MG/3ML solution pen-injector 3 mL 0     Sig: Inject 1 mg under the skin into the appropriate area as directed 1 (One) Time Per Week.        Pharmacy where request should be sent: [id number] - [place] [id number] [place] [address] [address] [place] [address] & [place] [address] - [phone] [facility] [phone] FX     Last office visit with prescribing clinician: 7/25/2023   Last telemedicine visit with prescribing clinician: Visit date not found   Next office visit with prescribing clinician: 9/1/2023     Additional details provided by patient:        THE PATIENT SAID IT SHOULD BE THE 2 MG       Does the patient have less than a 3 day supply:  [x] Yes  [] No    Would you like a call back once the refill request has been completed: [] Yes [x] No    If the office needs to give you a call back, can they leave a voicemail: [] Yes [x] No    Srinivasan Ledezma Rep   08/10/23 09:17 EDT

## 2023-08-17 DIAGNOSIS — M54.41 CHRONIC BILATERAL LOW BACK PAIN WITH BILATERAL SCIATICA: ICD-10-CM

## 2023-08-17 DIAGNOSIS — G89.29 CHRONIC BILATERAL LOW BACK PAIN WITH BILATERAL SCIATICA: ICD-10-CM

## 2023-08-17 DIAGNOSIS — M54.42 CHRONIC BILATERAL LOW BACK PAIN WITH BILATERAL SCIATICA: ICD-10-CM

## 2023-08-17 NOTE — TELEPHONE ENCOUNTER
Caller: Elba Meliza A    Relationship: Self    Best call back number: [phone]     Requested Prescriptions:   Requested Prescriptions     Pending Prescriptions Disp Refills    HYDROcodone-acetaminophen (NORCO) 7.5-325 MG per tablet 75 tablet 0     Sig: Take 1 tablet by mouth 3 (Three) Times a Day As Needed for Moderate Pain. for pain        Pharmacy where request should be sent: [place] [address] [place] [address] [facility] [phone] [facility] [phone] FX     Last office visit with prescribing clinician: 7/25/2023   Last telemedicine visit with prescribing clinician: Visit date not found   Next office visit with prescribing clinician: 9/1/2023     Additional details provided by patient:PATIENT IS NEEDING A REFILL    Does the patient have less than a 3 day supply:  [x] Yes  [] No    Would you like a call back once the refill request has been completed: [x] Yes [] No    If the office needs to give you a call back, can they leave a voicemail: [x] Yes [] No    Srinivasan Stevens Rep   08/17/23 09:16 EDT

## 2023-08-18 RX ORDER — HYDROCODONE BITARTRATE AND ACETAMINOPHEN 7.5; 325 MG/1; MG/1
1 TABLET ORAL 3 TIMES DAILY PRN
Qty: 75 TABLET | Refills: 0 | Status: SHIPPED | OUTPATIENT
Start: 2023-08-18

## 2023-08-28 RX ORDER — BLOOD SUGAR DIAGNOSTIC
STRIP MISCELLANEOUS
Qty: 200 EACH | Refills: 2 | Status: SHIPPED | OUTPATIENT
Start: 2023-08-28 | End: 2023-08-31 | Stop reason: SDUPTHER

## 2023-08-31 ENCOUNTER — TELEPHONE (OUTPATIENT)
Dept: FAMILY MEDICINE CLINIC | Age: 63
End: 2023-08-31
Payer: MEDICARE

## 2023-08-31 RX ORDER — BLOOD SUGAR DIAGNOSTIC
STRIP MISCELLANEOUS
Qty: 200 EACH | Refills: 2 | Status: SHIPPED | OUTPATIENT
Start: 2023-08-31

## 2023-08-31 NOTE — TELEPHONE ENCOUNTER
Caller: Meliza Arreola    Relationship: Self    Best call back number: [phone]    Requested Prescriptions:     glucose blood (OneTouch Ultra) test strip   (PATIENT UNSURE OF HOW OFTEN SHE IS SUPPOSED TO TEST)       Pharmacy where request should be sent:  [facility] [address] [place], [place] [id number] [place] [address] [facility] - [phone]  - [phone]  [phone]     Last office visit with prescribing clinician: 7/25/2023   Last telemedicine visit with prescribing clinician: Visit date not found   Next office visit with prescribing clinician: 9/1/2023     Additional details provided by patient: PATIENT STATES PHARMACY NEEDS DIRECTION ON HOW MANY TIMES A DAY PATIENT IS SUPPOSED TO TEST. PATIENT IS UNSURE OF HOW OFTEN SHE IS TO TEST    Does the patient have less than a 3 day supply:  [x] Yes  [] No    Would you like a call back once the refill request has been completed: [x] Yes [] No    If the office needs to give you a call back, can they leave a voicemail: [x] Yes [] No    Srinivasan Rasmussen Rep   08/31/23 10:49 EDT

## 2023-09-01 ENCOUNTER — OFFICE VISIT (OUTPATIENT)
Dept: FAMILY MEDICINE CLINIC | Age: 63
End: 2023-09-01
Payer: MEDICARE

## 2023-09-01 VITALS
HEIGHT: 64 IN | TEMPERATURE: 98.1 F | OXYGEN SATURATION: 95 % | BODY MASS INDEX: 28.68 KG/M2 | HEART RATE: 60 BPM | WEIGHT: 168 LBS | DIASTOLIC BLOOD PRESSURE: 66 MMHG | SYSTOLIC BLOOD PRESSURE: 100 MMHG

## 2023-09-01 DIAGNOSIS — Z01.419 WELL WOMAN EXAM: Primary | ICD-10-CM

## 2023-09-01 PROBLEM — Z00.00 ANNUAL PHYSICAL EXAM: Status: RESOLVED | Noted: 2022-07-08 | Resolved: 2023-09-01

## 2023-09-01 PROBLEM — D47.1 CHRONIC MYELOPROLIFERATIVE DISEASE: Chronic | Status: ACTIVE | Noted: 2023-08-09

## 2023-09-01 PROBLEM — D64.9 ANEMIA: Status: ACTIVE | Noted: 2023-08-09

## 2023-09-01 PROCEDURE — G0123 SCREEN CERV/VAG THIN LAYER: HCPCS | Performed by: FAMILY MEDICINE

## 2023-09-01 NOTE — PROGRESS NOTES
Chief Complaint  Gynecologic Exam    Subjective          Meliza Arreola presents to [facility] today for pap smear.    She is due for pap smear, last done 2/2019 and this showed NIL.  No HPV testing done d/t Medicare, so three year repeat recommended.       Current Outpatient Medications:     amitriptyline (ELAVIL) 10 MG tablet, TAKE 1 TABLET BY MOUTH ONCE DAILY AT NIGHT, Disp: 90 tablet, Rfl: 0    aspirin 81 MG chewable tablet, Chew 1 tablet Daily., Disp: , Rfl:     atorvastatin (LIPITOR) 10 MG tablet, Take 1 tablet by mouth once daily, Disp: 90 tablet, Rfl: 0    azelaic acid (AZELEX) 15 % gel, APPLY A THIN LAYER TOPICALLY TO FACE TWICE DAILY, Disp: , Rfl:     Canagliflozin (Invokana) 100 MG tablet tablet, Take 1 tablet by mouth Daily., Disp: 90 tablet, Rfl: 1    clopidogrel (PLAVIX) 75 MG tablet, Take 1 tablet by mouth Daily., Disp: , Rfl:     dilTIAZem SR (CARDIZEM SR) 120 MG 12 hr capsule, Take 1 capsule by mouth Daily., Disp: , Rfl:     glipizide (GLUCOTROL XL) 5 MG ER tablet, Take 1 tablet by mouth twice daily, Disp: 180 tablet, Rfl: 0    glucose blood (OneTouch Ultra) test strip, Check blood sugar once daily.   DX E11.42, Disp: 200 each, Rfl: 2    hydroCHLOROthiazide (MICROZIDE) 12.5 MG capsule, hydrochlorothiazide 12.5 mg oral capsule take 1 capsule (12.5 mg) by oral route once daily   Active, Disp: , Rfl:     HYDROcodone-acetaminophen (NORCO) 7.5-325 MG per tablet, Take 1 tablet by mouth 3 (Three) Times a Day As Needed for Moderate Pain. for pain, Disp: 75 tablet, Rfl: 0    hydrOXYzine (ATARAX) 50 MG tablet, Take 1 tablet by mouth Every 8 (Eight) Hours As Needed for Anxiety., Disp: 90 tablet, Rfl: 2    Ivermectin 1 % cream, APPLY CREAM TOPICALLY TO FACE AT NIGHTTIME, Disp: , Rfl:     losartan (COZAAR) 25 MG tablet, Take 1 tablet by mouth Daily., Disp: , Rfl:     metoprolol succinate XL (TOPROL-XL) 50 MG 24 hr tablet, Take 1 tablet by mouth Daily., Disp: , Rfl:      "omeprazole (priLOSEC) 40 MG capsule, Take 1 capsule by mouth once daily, Disp: 90 capsule, Rfl: 1    pregabalin (LYRICA) 150 MG capsule, Take 1 capsule by mouth 2 (Two) Times a Day., Disp: 60 capsule, Rfl: 2    Semaglutide, 2 MG/DOSE, (Ozempic, 2 MG/DOSE,) 8 MG/3ML solution pen-injector, Inject 2 mg under the skin into the appropriate area as directed 1 (One) Time Per Week., Disp: 3 mL, Rfl: 2    Umeclidinium-Vilanterol (Anoro Ellipta) 62.5-25 MCG/ACT aerosol powder  inhaler, Inhale 1 puff Daily., Disp: 180 each, Rfl: 4  There are no discontinued medications.      Allergies:  Methylprednisolone      Objective   Vital Signs:   Vitals:    09/01/23 1035   BP: 100/66   BP Location: Left arm   Patient Position: Sitting   Cuff Size: Adult   Pulse: 60   Temp: 98.1 øF (36.7 øC)   TempSrc: Oral   SpO2: 95%   Weight: 76.2 kg (168 lb)   Height: 162.6 cm (64.02\")     BP Readings from Last 3 Encounters:   09/01/23 100/66   08/07/23 125/58   07/25/23 106/51     Wt Readings from Last 3 Encounters:   09/01/23 76.2 kg (168 lb)   08/07/23 77.6 kg (171 lb)   07/25/23 76.7 kg (169 lb)         Physical Exam  Vitals reviewed.   Constitutional:       General: She is not in acute distress.     Appearance: Normal appearance. She is well-developed.   HENT:      Head: Normocephalic and atraumatic.      Right Ear: Hearing and external ear normal. No decreased hearing noted.      Left Ear: Hearing and external ear normal. No decreased hearing noted.   Eyes:      General: Lids are normal.   Pulmonary:      Effort: Pulmonary effort is normal.   Chest:   Breasts:     Right: Normal. No inverted nipple, mass, nipple discharge, skin change or tenderness.      Left: Normal. No inverted nipple, mass, nipple discharge, skin change or tenderness.   Abdominal:      General: There is no distension.      Palpations: Abdomen is soft.      Tenderness: There is no abdominal tenderness.   Genitourinary:     General: Normal vulva.      Vagina: Normal.      " Cervix: Normal.      Uterus: Normal. Not tender.       Adnexa: Right adnexa normal and left adnexa normal.        Right: No tenderness.          Left: No tenderness.     Musculoskeletal:         General: No tenderness or deformity. Normal range of motion.   Lymphadenopathy:      Upper Body:      Right upper body: No supraclavicular, axillary or pectoral adenopathy.      Left upper body: No supraclavicular, axillary or pectoral adenopathy.   Neurological:      Mental Status: She is alert and oriented to person, place, and time.      Motor: No abnormal muscle tone.      Deep Tendon Reflexes: Reflexes are normal and symmetric. Reflexes normal.   Psychiatric:         Mood and Affect: Mood normal.         Behavior: Behavior normal.         Lab Results   Component Value Date    GLUCOSE 72 07/05/2023    BUN 18 07/05/2023    CREATININE 0.82 07/05/2023    EGFRIFNONA 57 (L) 08/19/2021    BCR 22.0 07/05/2023    K 4.2 07/05/2023    CO2 24.8 07/05/2023    CALCIUM 9.9 07/05/2023    ALBUMIN 4.5 07/05/2023    LABIL2 1.4 02/17/2021    AST 24 07/05/2023    ALT 22 07/05/2023       Lab Results   Component Value Date    CHOL 136 07/05/2023    CHLPL 184 02/17/2021    TRIG 123 07/05/2023    HDL 38 (L) 07/05/2023    LDL 76 07/05/2023            Assessment and Plan    Diagnoses and all orders for this visit:    1. Well woman exam (Primary)  Assessment & Plan:  Pap smear and breast exam completed today.  No abnormalities noted.    Orders:  -     IGP,rfx Aptima HPV All Pth        Follow Up   Return if symptoms worsen or fail to improve, for Or as scheduled 10/13/2023.  Patient was given instructions and counseling regarding her condition or for health maintenance advice. Please see specific information pulled into the AVS if appropriate.           09/01/2023

## 2023-09-06 RX ORDER — OMEPRAZOLE 40 MG/1
40 CAPSULE, DELAYED RELEASE ORAL DAILY
Qty: 90 CAPSULE | Refills: 1 | Status: SHIPPED | OUTPATIENT
Start: 2023-09-06

## 2023-09-07 LAB
CONV .: NORMAL
CYTOLOGIST CVX/VAG CYTO: NORMAL
CYTOLOGY CVX/VAG DOC CYTO: NORMAL
CYTOLOGY CVX/VAG DOC THIN PREP: NORMAL
DX ICD CODE: NORMAL
HIV 1 & 2 AB SER-IMP: NORMAL
OTHER STN SPEC: NORMAL
STAT OF ADQ CVX/VAG CYTO-IMP: NORMAL

## 2023-09-14 DIAGNOSIS — G89.29 CHRONIC BILATERAL LOW BACK PAIN WITH BILATERAL SCIATICA: ICD-10-CM

## 2023-09-14 DIAGNOSIS — M54.41 CHRONIC BILATERAL LOW BACK PAIN WITH BILATERAL SCIATICA: ICD-10-CM

## 2023-09-14 DIAGNOSIS — M54.42 CHRONIC BILATERAL LOW BACK PAIN WITH BILATERAL SCIATICA: ICD-10-CM

## 2023-09-14 NOTE — TELEPHONE ENCOUNTER
Caller: SolefayMeliza    Relationship: Self    Best call back number: [phone]     Requested Prescriptions:   Requested Prescriptions     Pending Prescriptions Disp Refills    HYDROcodone-acetaminophen (NORCO) 7.5-325 MG per tablet 75 tablet 0     Sig: Take 1 tablet by mouth 3 (Three) Times a Day As Needed for Moderate Pain. for pain        Pharmacy where request should be sent: [place] [address] [facility] [phone] [facility] [phone] FX     Last office visit with prescribing clinician: 9/1/2023   Last telemedicine visit with prescribing clinician: Visit date not found   Next office visit with prescribing clinician: 10/13/2023     Does the patient have less than a 3 day supply:  [x] Yes  [] No    Would you like a call back once the refill request has been completed: [] Yes [] No    If the office needs to give you a call back, can they leave a voicemail: [] Yes [] No    Srinivasan Pearson Rep   09/14/23 08:20 EDT

## 2023-09-15 RX ORDER — HYDROCODONE BITARTRATE AND ACETAMINOPHEN 7.5; 325 MG/1; MG/1
1 TABLET ORAL 3 TIMES DAILY PRN
Qty: 75 TABLET | Refills: 0 | Status: SHIPPED | OUTPATIENT
Start: 2023-09-15

## 2023-10-13 ENCOUNTER — LAB (OUTPATIENT)
Dept: LAB | Facility: HOSPITAL | Age: 63
End: 2023-10-13
Payer: MEDICARE

## 2023-10-13 ENCOUNTER — OFFICE VISIT (OUTPATIENT)
Dept: FAMILY MEDICINE CLINIC | Age: 63
End: 2023-10-13
Payer: MEDICARE

## 2023-10-13 VITALS
HEIGHT: 64 IN | HEART RATE: 60 BPM | OXYGEN SATURATION: 95 % | DIASTOLIC BLOOD PRESSURE: 59 MMHG | SYSTOLIC BLOOD PRESSURE: 112 MMHG | BODY MASS INDEX: 28.71 KG/M2 | TEMPERATURE: 98.1 F | WEIGHT: 168.2 LBS

## 2023-10-13 DIAGNOSIS — G89.4 CHRONIC PAIN SYNDROME: ICD-10-CM

## 2023-10-13 DIAGNOSIS — J44.89 ASTHMA-COPD OVERLAP SYNDROME: ICD-10-CM

## 2023-10-13 DIAGNOSIS — F43.10 PTSD (POST-TRAUMATIC STRESS DISORDER): ICD-10-CM

## 2023-10-13 DIAGNOSIS — Z79.899 HIGH RISK MEDICATION USE: ICD-10-CM

## 2023-10-13 DIAGNOSIS — I10 ESSENTIAL HYPERTENSION: ICD-10-CM

## 2023-10-13 DIAGNOSIS — E11.42 TYPE 2 DIABETES MELLITUS WITH DIABETIC POLYNEUROPATHY, WITHOUT LONG-TERM CURRENT USE OF INSULIN: ICD-10-CM

## 2023-10-13 DIAGNOSIS — G89.29 CHRONIC BILATERAL LOW BACK PAIN WITH BILATERAL SCIATICA: Primary | ICD-10-CM

## 2023-10-13 DIAGNOSIS — I48.11 LONGSTANDING PERSISTENT ATRIAL FIBRILLATION: ICD-10-CM

## 2023-10-13 DIAGNOSIS — M54.42 CHRONIC BILATERAL LOW BACK PAIN WITH BILATERAL SCIATICA: Primary | ICD-10-CM

## 2023-10-13 DIAGNOSIS — F33.1 MAJOR DEPRESSIVE DISORDER, RECURRENT EPISODE, MODERATE DEGREE: ICD-10-CM

## 2023-10-13 DIAGNOSIS — M54.41 CHRONIC BILATERAL LOW BACK PAIN WITH BILATERAL SCIATICA: Primary | ICD-10-CM

## 2023-10-13 PROBLEM — Z01.419 WELL WOMAN EXAM: Status: RESOLVED | Noted: 2023-09-01 | Resolved: 2023-10-13

## 2023-10-13 LAB
ALBUMIN SERPL-MCNC: 4.3 G/DL (ref 3.5–5.2)
ALBUMIN/GLOB SERPL: 2 G/DL
ALP SERPL-CCNC: 107 U/L (ref 39–117)
ALT SERPL W P-5'-P-CCNC: 22 U/L (ref 1–33)
AMPHET+METHAMPHET UR QL: NEGATIVE
AMPHETAMINE INTERNAL CONTROL: ABNORMAL
AMPHETAMINES UR QL: NEGATIVE
ANION GAP SERPL CALCULATED.3IONS-SCNC: 9.6 MMOL/L (ref 5–15)
AST SERPL-CCNC: 25 U/L (ref 1–32)
BARBITURATE INTERNAL CONTROL: ABNORMAL
BARBITURATES UR QL SCN: NEGATIVE
BASOPHILS # BLD AUTO: 0.01 10*3/MM3 (ref 0–0.2)
BASOPHILS NFR BLD AUTO: 0.1 % (ref 0–1.5)
BENZODIAZ UR QL SCN: NEGATIVE
BENZODIAZEPINE INTERNAL CONTROL: ABNORMAL
BILIRUB SERPL-MCNC: 0.3 MG/DL (ref 0–1.2)
BUN SERPL-MCNC: 19 MG/DL (ref 8–23)
BUN/CREAT SERPL: 19.8 (ref 7–25)
BUPRENORPHINE INTERNAL CONTROL: ABNORMAL
BUPRENORPHINE SERPL-MCNC: NEGATIVE NG/ML
CALCIUM SPEC-SCNC: 9.6 MG/DL (ref 8.6–10.5)
CANNABINOIDS SERPL QL: NEGATIVE
CHLORIDE SERPL-SCNC: 106 MMOL/L (ref 98–107)
CHOLEST SERPL-MCNC: 134 MG/DL (ref 0–200)
CO2 SERPL-SCNC: 29.4 MMOL/L (ref 22–29)
COCAINE INTERNAL CONTROL: ABNORMAL
COCAINE UR QL: NEGATIVE
CREAT SERPL-MCNC: 0.96 MG/DL (ref 0.57–1)
DEPRECATED RDW RBC AUTO: 48.4 FL (ref 37–54)
EGFRCR SERPLBLD CKD-EPI 2021: 67.9 ML/MIN/1.73
EOSINOPHIL # BLD AUTO: 0.16 10*3/MM3 (ref 0–0.4)
EOSINOPHIL NFR BLD AUTO: 1.6 % (ref 0.3–6.2)
ERYTHROCYTE [DISTWIDTH] IN BLOOD BY AUTOMATED COUNT: 13.5 % (ref 12.3–15.4)
EXPIRATION DATE: ABNORMAL
GLOBULIN UR ELPH-MCNC: 2.2 GM/DL
GLUCOSE SERPL-MCNC: 84 MG/DL (ref 65–99)
HBA1C MFR BLD: 5.3 % (ref 4.8–5.6)
HCT VFR BLD AUTO: 47.5 % (ref 34–46.6)
HDLC SERPL-MCNC: 40 MG/DL (ref 40–60)
HGB BLD-MCNC: 15.6 G/DL (ref 12–15.9)
IMM GRANULOCYTES # BLD AUTO: 0.01 10*3/MM3 (ref 0–0.05)
IMM GRANULOCYTES NFR BLD AUTO: 0.1 % (ref 0–0.5)
LDLC SERPL CALC-MCNC: 71 MG/DL (ref 0–100)
LDLC/HDLC SERPL: 1.7 {RATIO}
LYMPHOCYTES # BLD AUTO: 3.59 10*3/MM3 (ref 0.7–3.1)
LYMPHOCYTES NFR BLD AUTO: 34.9 % (ref 19.6–45.3)
Lab: ABNORMAL
MCH RBC QN AUTO: 31.5 PG (ref 26.6–33)
MCHC RBC AUTO-ENTMCNC: 32.8 G/DL (ref 31.5–35.7)
MCV RBC AUTO: 96 FL (ref 79–97)
MDMA (ECSTASY) INTERNAL CONTROL: ABNORMAL
MDMA UR QL SCN: NEGATIVE
METHADONE INTERNAL CONTROL: ABNORMAL
METHADONE UR QL SCN: NEGATIVE
METHAMPHETAMINE INTERNAL CONTROL: ABNORMAL
MONOCYTES # BLD AUTO: 0.69 10*3/MM3 (ref 0.1–0.9)
MONOCYTES NFR BLD AUTO: 6.7 % (ref 5–12)
NEUTROPHILS NFR BLD AUTO: 5.82 10*3/MM3 (ref 1.7–7)
NEUTROPHILS NFR BLD AUTO: 56.6 % (ref 42.7–76)
OPIATES INTERNAL CONTROL: ABNORMAL
OPIATES UR QL: POSITIVE
OXYCODONE INTERNAL CONTROL: ABNORMAL
OXYCODONE UR QL SCN: NEGATIVE
PCP UR QL SCN: NEGATIVE
PHENCYCLIDINE INTERNAL CONTROL: ABNORMAL
PLATELET # BLD AUTO: 235 10*3/MM3 (ref 140–450)
PMV BLD AUTO: 9.5 FL (ref 6–12)
POTASSIUM SERPL-SCNC: 4.1 MMOL/L (ref 3.5–5.2)
PROT SERPL-MCNC: 6.5 G/DL (ref 6–8.5)
RBC # BLD AUTO: 4.95 10*6/MM3 (ref 3.77–5.28)
SODIUM SERPL-SCNC: 145 MMOL/L (ref 136–145)
THC INTERNAL CONTROL: ABNORMAL
TRIGL SERPL-MCNC: 131 MG/DL (ref 0–150)
VLDLC SERPL-MCNC: 23 MG/DL (ref 5–40)
WBC NRBC COR # BLD: 10.28 10*3/MM3 (ref 3.4–10.8)

## 2023-10-13 PROCEDURE — 36415 COLL VENOUS BLD VENIPUNCTURE: CPT

## 2023-10-13 PROCEDURE — 83036 HEMOGLOBIN GLYCOSYLATED A1C: CPT

## 2023-10-13 PROCEDURE — 85025 COMPLETE CBC W/AUTO DIFF WBC: CPT

## 2023-10-13 PROCEDURE — 80061 LIPID PANEL: CPT

## 2023-10-13 PROCEDURE — 80053 COMPREHEN METABOLIC PANEL: CPT

## 2023-10-13 RX ORDER — HYDROCODONE BITARTRATE AND ACETAMINOPHEN 7.5; 325 MG/1; MG/1
1 TABLET ORAL 3 TIMES DAILY PRN
Qty: 75 TABLET | Refills: 0 | Status: SHIPPED | OUTPATIENT
Start: 2023-10-13

## 2023-10-13 NOTE — ASSESSMENT & PLAN NOTE
She is on Ozempic, Invokana and glipizide for diabetes.  She has had some issues with getting the Ozempic due to the back orders.  She is on an ASA, ACEI and statin.  Last A1c 5.4.  She has been working hard on weight loss.  She is up-to-date on foot exam, last done 1/2023.  She is UTD on eye exam, last done 6/2023.  She is on Lyrica for diabetic peripheral neuropathy.  No adverse effects.  Controlled substance monitoring as above.  Checking labs.

## 2023-10-13 NOTE — ASSESSMENT & PLAN NOTE
Blood pressure has been running at goal.  Continue diltiazem 120 mg daily, HCTZ 12.5 mg daily, losartan 25 mg daily and metoprolol succinate 50 mg daily.  Refills were not needed today.  Labs were needed today.  Continue to monitor.

## 2023-10-13 NOTE — PROGRESS NOTES
Chief Complaint  Chronic Pain Syndrome (3 month f/u)    Subjective          Meliza Arreola presents to [facility] today for routine f/u of chronic issues.    She is on Norco #75 monthly, Lyrica, cyclobenzaprine and amitriptyline for chronic pain syndrome.  She takes the Norco 2-3x daily, depending on her pain level.  Pain is worst in the low back, knees and shoulders.  She also has diabetic neuropathy for which she uses the Lyrica.  She is not sure whether she is still taking Plavix but will investigate bc it looks like that may have been discontinued.  S/p PT and epidurals in the past but without significant relief.  S/p L TKA.  S/p Physical Therapy.  She does follow a home exercise program.      She is on Ozempic, Invokana and glipizide for diabetes.  She is on an ASA, ACEI and statin.  Last A1c 5.4.  She has been working hard on weight loss.  She is up-to-date on foot exam, last done 1/2023.  She is UTD on eye exam, last done 6/2023.  She is on Lyrica for diabetic peripheral neuropathy.  No adverse effects.      She is on diltiazem, hydrochlorothiazide, losartan, and metoprolol succinate for HTN and a-fib.  BP has been well controlled.  She is on atorvastatin for HLD.  No myalgias.  She has a Watchman Device.  She is on ASA and no longer taking DOAC.  Plavix d/sherman.  S/p pacemaker placement for SSS.  She is following with Dr. Gross.      She is on Anoro Ellipta for COPD.  She does have some baseline shortness of breath.  She is following with Yuly Chase and Dr. Mcfarlane.      She is on amitriptyline and hydroxyzine prn for chronic PTSD and depression. She has previously followed with Dr. Romero Psychiatry but is no longer seeing him.  She is not interested in a referral right now.  She is no longer taking buspirone or clonazepam.      She is on omeprazole for GERD.  Denies heartburn or reflux.      She has DUNCAN and has been very faithful with BiP[employer].       Current  Outpatient Medications:     amitriptyline (ELAVIL) 10 MG tablet, TAKE 1 TABLET BY MOUTH ONCE DAILY AT NIGHT, Disp: 90 tablet, Rfl: 0    aspirin 81 MG chewable tablet, Chew 1 tablet Daily., Disp: , Rfl:     atorvastatin (LIPITOR) 10 MG tablet, Take 1 tablet by mouth once daily, Disp: 90 tablet, Rfl: 0    azelaic acid (AZELEX) 15 % gel, APPLY A THIN LAYER TOPICALLY TO FACE TWICE DAILY, Disp: , Rfl:     Canagliflozin (Invokana) 100 MG tablet tablet, Take 1 tablet by mouth Daily., Disp: 90 tablet, Rfl: 1    dilTIAZem SR (CARDIZEM SR) 120 MG 12 hr capsule, Take 1 capsule by mouth Daily., Disp: , Rfl:     glipizide (GLUCOTROL XL) 5 MG ER tablet, Take 1 tablet by mouth twice daily, Disp: 180 tablet, Rfl: 0    glucose blood (OneTouch Ultra) test strip, Check blood sugar once daily.   DX E11.42, Disp: 200 each, Rfl: 2    hydroCHLOROthiazide (MICROZIDE) 12.5 MG capsule, hydrochlorothiazide 12.5 mg oral capsule take 1 capsule (12.5 mg) by oral route once daily   Active, Disp: , Rfl:     HYDROcodone-acetaminophen (NORCO) 7.5-325 MG per tablet, Take 1 tablet by mouth 3 (Three) Times a Day As Needed for Moderate Pain. for pain, Disp: 75 tablet, Rfl: 0    hydrOXYzine (ATARAX) 50 MG tablet, Take 1 tablet by mouth Every 8 (Eight) Hours As Needed for Anxiety., Disp: 90 tablet, Rfl: 2    Ivermectin 1 % cream, APPLY CREAM TOPICALLY TO FACE AT NIGHTTIME, Disp: , Rfl:     losartan (COZAAR) 25 MG tablet, Take 1 tablet by mouth Daily., Disp: , Rfl:     metoprolol succinate XL (TOPROL-XL) 50 MG 24 hr tablet, Take 1 tablet by mouth Daily., Disp: , Rfl:     omeprazole (priLOSEC) 40 MG capsule, Take 1 capsule by mouth once daily, Disp: 90 capsule, Rfl: 1    pregabalin (LYRICA) 150 MG capsule, Take 1 capsule by mouth 2 (Two) Times a Day., Disp: 60 capsule, Rfl: 2    Semaglutide, 2 MG/DOSE, (Ozempic, 2 MG/DOSE,) 8 MG/3ML solution pen-injector, Inject 2 mg under the skin into the appropriate area as directed 1 (One) Time Per Week., Disp: 3 mL,  "Rfl: 2    Umeclidinium-Vilanterol (Anoro Ellipta) 62.5-25 MCG/ACT aerosol powder  inhaler, Inhale 1 puff Daily., Disp: 180 each, Rfl: 4    Allergies:  Methylprednisolone      Objective   Vital Signs:   Vitals:    10/13/23 0758   BP: 112/59   BP Location: Left arm   Patient Position: Sitting   Cuff Size: Adult   Pulse: 60   Temp: 98.1 øF (36.7 øC)   TempSrc: Oral   SpO2: 95%   Weight: 76.3 kg (168 lb 3.2 oz)   Height: 162.6 cm (64.02\")       Physical Exam  Vitals reviewed.   Constitutional:       General: She is not in acute distress.     Appearance: Normal appearance. She is well-developed.   HENT:      Head: Normocephalic and atraumatic.      Right Ear: External ear normal.      Left Ear: External ear normal.   Eyes:      Extraocular Movements: Extraocular movements intact.      Conjunctiva/sclera: Conjunctivae normal.      Pupils: Pupils are equal, round, and reactive to light.   Cardiovascular:      Rate and Rhythm: Normal rate and regular rhythm.      Heart sounds: No murmur heard.  Pulmonary:      Effort: Pulmonary effort is normal.      Breath sounds: Normal breath sounds. No wheezing, rhonchi or rales.   Abdominal:      General: Bowel sounds are normal. There is no distension.      Palpations: Abdomen is soft.      Tenderness: There is no abdominal tenderness.   Musculoskeletal:         General: Tenderness (bilateral lumbar paraspinals) present. Normal range of motion.   Feet:      Right foot:      Skin integrity: No ulcer.   Neurological:      Mental Status: She is alert.   Psychiatric:         Mood and Affect: Mood and affect normal.         Behavior: Behavior normal.          Lab Results   Component Value Date    GLUCOSE 72 07/05/2023    BUN 18 07/05/2023    CREATININE 0.82 07/05/2023    EGFRIFNONA 57 (L) 08/19/2021    BCR 22.0 07/05/2023    K 4.2 07/05/2023    CO2 24.8 07/05/2023    CALCIUM 9.9 07/05/2023    ALBUMIN 4.5 07/05/2023    LABIL2 1.4 02/17/2021    AST 24 07/05/2023    ALT 22 07/05/2023 "       Lab Results   Component Value Date    CHOL 136 07/05/2023    CHLPL 184 02/17/2021    TRIG 123 07/05/2023    HDL 38 (L) 07/05/2023    LDL 76 07/05/2023     Lab Results   Component Value Date    HGBA1C 5.40 07/05/2023            Assessment and Plan    Diagnoses and all orders for this visit:    1. Chronic bilateral low back pain with bilateral sciatica (Primary)  Assessment & Plan:  EMG/NCV done in August came back as a normal study although she did have some delayed conduction in the right sural nerve.  She is going to double check at home to see if she has actually been taken off of the Plavix and if so we are going to add back in meloxicam which she has taken previously to good effect.  She is also potentially interested in going to see Dr. Hillman Pain Management at [facility] in the new year.  For now, continue current regimen including Norco.  No adverse effects. She does require ongoing use of this controlled substance to function.  Tox screen was due today.  Prior tox screen appropriate.  ERWIN was run today.  Refills were needed today.  RTC 3 months.      Orders:  -     HYDROcodone-acetaminophen (NORCO) 7.5-325 MG per tablet; Take 1 tablet by mouth 3 (Three) Times a Day As Needed for Moderate Pain. for pain  Dispense: 75 tablet; Refill: 0    2. Chronic pain syndrome  Assessment & Plan:  As above.      3. High risk medication use  -     POC Urine Drug Screen [facility]    4. Type 2 diabetes mellitus with diabetic polyneuropathy, without long-term current use of insulin  Assessment & Plan:  She is on Ozempic, Invokana and glipizide for diabetes.  She has had some issues with getting the Ozempic due to the back orders.  She is on an ASA, ACEI and statin.  Last A1c 5.4.  She has been working hard on weight loss.  She is up-to-date on foot exam, last done 1/2023.  She is UTD on eye exam, last done 6/2023.  She is on Lyrica for diabetic peripheral neuropathy.  No adverse effects.  Controlled substance  monitoring as above.  Checking labs.    Orders:  -     HYDROcodone-acetaminophen (NORCO) 7.5-325 MG per tablet; Take 1 tablet by mouth 3 (Three) Times a Day As Needed for Moderate Pain. for pain  Dispense: 75 tablet; Refill: 0  -     Comprehensive Metabolic Panel; Future  -     Lipid Panel; Future  -     Hemoglobin A1c; Future    5. Essential hypertension  Assessment & Plan:  Blood pressure has been running at goal.  Continue diltiazem 120 mg daily, HCTZ 12.5 mg daily, losartan 25 mg daily and metoprolol succinate 50 mg daily.  Refills were not needed today.  Labs were needed today.  Continue to monitor.      Orders:  -     CBC Auto Differential; Future    6. Longstanding persistent atrial fibrillation  Overview:  No longer on DOAC but continues on ASA/Plavix.  S/p Watchman Device and pacemaker placement.  Following with Dr. Gross Cardiology.      7. Major depressive disorder, recurrent episode, moderate degree  Overview:  Has previously followed with Dr. Romero Psychiatry but no longer.  Currently on amitriptyline and hydroxyzine.  Continue to monitor.      8. PTSD (post-traumatic stress disorder)  Assessment & Plan:  As per depression plan.      9. Asthma-COPD overlap syndrome  Overview:  On Anora Ellipta.  Following with Dr. Mcfarlane Pulmonology.              Follow Up   Return in about 3 months (around 1/13/2024) for Recheck.  Patient was given instructions and counseling regarding her condition or for health maintenance advice. Please see specific information pulled into the AVS if appropriate.

## 2023-10-13 NOTE — ASSESSMENT & PLAN NOTE
EMG/NCV done in August came back as a normal study although she did have some delayed conduction in the right sural nerve.  She is going to double check at home to see if she has actually been taken off of the Plavix and if so we are going to add back in meloxicam which she has taken previously to good effect.  She is also potentially interested in going to see Dr. Hillman Pain Management at [facility] in the new year.  For now, continue current regimen including Norco.  No adverse effects. She does require ongoing use of this controlled substance to function.  Tox screen was due today.  Prior tox screen appropriate.  ERWIN was run today.  Refills were needed today.  RTC 3 months.

## 2023-10-17 DIAGNOSIS — E78.2 MIXED HYPERLIPIDEMIA: ICD-10-CM

## 2023-10-17 RX ORDER — AMITRIPTYLINE HYDROCHLORIDE 10 MG/1
TABLET, FILM COATED ORAL
Qty: 90 TABLET | Refills: 1 | Status: SHIPPED | OUTPATIENT
Start: 2023-10-17

## 2023-10-17 RX ORDER — ATORVASTATIN CALCIUM 10 MG/1
TABLET, FILM COATED ORAL
Qty: 90 TABLET | Refills: 1 | Status: SHIPPED | OUTPATIENT
Start: 2023-10-17

## 2023-10-25 DIAGNOSIS — E11.42 TYPE 2 DIABETES MELLITUS WITH DIABETIC POLYNEUROPATHY, WITHOUT LONG-TERM CURRENT USE OF INSULIN: ICD-10-CM

## 2023-10-26 RX ORDER — SEMAGLUTIDE 2.68 MG/ML
INJECTION, SOLUTION SUBCUTANEOUS
Qty: 3 ML | Refills: 2 | OUTPATIENT
Start: 2023-10-26

## 2023-10-26 RX ORDER — SEMAGLUTIDE 2.68 MG/ML
INJECTION, SOLUTION SUBCUTANEOUS
Qty: 3 ML | Refills: 2 | Status: SHIPPED | OUTPATIENT
Start: 2023-10-26

## 2023-10-30 DIAGNOSIS — E11.42 TYPE 2 DIABETES MELLITUS WITH DIABETIC POLYNEUROPATHY, WITHOUT LONG-TERM CURRENT USE OF INSULIN: ICD-10-CM

## 2023-10-30 RX ORDER — SEMAGLUTIDE 2.68 MG/ML
INJECTION, SOLUTION SUBCUTANEOUS
Qty: 3 ML | Refills: 2 | Status: CANCELLED | OUTPATIENT
Start: 2023-10-30

## 2023-10-30 NOTE — TELEPHONE ENCOUNTER
Caller: Meliza Arreola    Relationship: Self    Best call back number:[phone]     Requested Prescriptions:   Requested Prescriptions     Pending Prescriptions Disp Refills    Semaglutide, 2 MG/DOSE, (Ozempic, 2 MG/DOSE,) 8 MG/3ML solution pen-injector 3 mL 2        Pharmacy where request should be sent: [id number] - [facility] [address] [facility] [address]  [address] [facility] [address] [place] [address] [address] [place] [address] - [phone] [facility] [phone] FX     Last office visit with prescribing clinician: 10/13/2023   Last telemedicine visit with prescribing clinician: Visit date not found   Next office visit with prescribing clinician: 1/19/2024     Additional details provided by patient:     Does the patient have less than a 3 day supply:  [] Yes  [x] No    Would you like a call back once the refill request has been completed: [] Yes [] No    If the office needs to give you a call back, can they leave a voicemail: [] Yes [] No    Srinivasan Cordova Rep   10/30/23 09:23 EDT

## 2023-11-05 DIAGNOSIS — E11.42 DIABETIC PERIPHERAL NEUROPATHY: ICD-10-CM

## 2023-11-08 RX ORDER — PREGABALIN 150 MG/1
150 CAPSULE ORAL 2 TIMES DAILY
Qty: 60 CAPSULE | Refills: 2 | Status: SHIPPED | OUTPATIENT
Start: 2023-11-08

## 2023-11-08 RX ORDER — GLIPIZIDE 5 MG/1
5 TABLET, FILM COATED, EXTENDED RELEASE ORAL 2 TIMES DAILY
Qty: 180 TABLET | Refills: 1 | Status: SHIPPED | OUTPATIENT
Start: 2023-11-08

## 2023-11-10 DIAGNOSIS — M54.41 CHRONIC BILATERAL LOW BACK PAIN WITH BILATERAL SCIATICA: ICD-10-CM

## 2023-11-10 DIAGNOSIS — E11.42 TYPE 2 DIABETES MELLITUS WITH DIABETIC POLYNEUROPATHY, WITHOUT LONG-TERM CURRENT USE OF INSULIN: ICD-10-CM

## 2023-11-10 DIAGNOSIS — G89.29 CHRONIC BILATERAL LOW BACK PAIN WITH BILATERAL SCIATICA: ICD-10-CM

## 2023-11-10 DIAGNOSIS — M54.42 CHRONIC BILATERAL LOW BACK PAIN WITH BILATERAL SCIATICA: ICD-10-CM

## 2023-11-10 RX ORDER — HYDROCODONE BITARTRATE AND ACETAMINOPHEN 7.5; 325 MG/1; MG/1
1 TABLET ORAL 3 TIMES DAILY PRN
Qty: 75 TABLET | Refills: 0 | Status: SHIPPED | OUTPATIENT
Start: 2023-11-10

## 2023-11-15 RX ORDER — CANAGLIFLOZIN 100 MG/1
1 TABLET, FILM COATED ORAL DAILY
Qty: 90 TABLET | Refills: 0 | Status: SHIPPED | OUTPATIENT
Start: 2023-11-15

## 2023-12-07 ENCOUNTER — TELEPHONE (OUTPATIENT)
Dept: FAMILY MEDICINE CLINIC | Age: 63
End: 2023-12-07

## 2023-12-07 NOTE — TELEPHONE ENCOUNTER
Caller: Meliza Arreola    Relationship to patient: Self    Best call back number: [phone]    Patient is needing: PATIENT CALLED IN AND IS REQUESTING A CALL BACK REGARDING HOW SHE WILL GET REFILLS ON MEDICATION AFTER INSURANCE EXPIRES AND IS REQUESTING GUIDANCE PICKING A NEW PCP

## 2023-12-11 ENCOUNTER — TELEPHONE (OUTPATIENT)
Dept: FAMILY MEDICINE CLINIC | Age: 63
End: 2023-12-11

## 2023-12-11 DIAGNOSIS — G89.29 CHRONIC BILATERAL LOW BACK PAIN WITH BILATERAL SCIATICA: ICD-10-CM

## 2023-12-11 DIAGNOSIS — M54.42 CHRONIC BILATERAL LOW BACK PAIN WITH BILATERAL SCIATICA: ICD-10-CM

## 2023-12-11 DIAGNOSIS — M54.41 CHRONIC BILATERAL LOW BACK PAIN WITH BILATERAL SCIATICA: ICD-10-CM

## 2023-12-11 DIAGNOSIS — E11.42 TYPE 2 DIABETES MELLITUS WITH DIABETIC POLYNEUROPATHY, WITHOUT LONG-TERM CURRENT USE OF INSULIN: ICD-10-CM

## 2023-12-11 NOTE — TELEPHONE ENCOUNTER
Caller: Solefay Meliza A    Relationship: Self    Best call back number: [phone]     Requested Prescriptions:   Requested Prescriptions     Pending Prescriptions Disp Refills    HYDROcodone-acetaminophen (NORCO) 7.5-325 MG per tablet 75 tablet 0     Sig: Take 1 tablet by mouth 3 (Three) Times a Day As Needed for Moderate Pain. for pain        Pharmacy where request should be sent: [place] [id number] [facility] [phone] [facility] [phone] FX     Last office visit with prescribing clinician: 10/13/2023   Last telemedicine visit with prescribing clinician: Visit date not found   Next office visit with prescribing clinician: 1/19/2024       Does the patient have less than a 3 day supply:  [x] Yes  [] No    Would you like a call back once the refill request has been completed: [] Yes [x] No    If the office needs to give you a call back, can they leave a voicemail: [] Yes [x] No    Verna Frank, PCT   12/11/23 08:19 EST

## 2023-12-12 RX ORDER — HYDROCODONE BITARTRATE AND ACETAMINOPHEN 7.5; 325 MG/1; MG/1
1 TABLET ORAL 3 TIMES DAILY PRN
Qty: 75 TABLET | Refills: 0 | Status: SHIPPED | OUTPATIENT
Start: 2023-12-12

## 2023-12-21 DIAGNOSIS — E11.42 TYPE 2 DIABETES MELLITUS WITH DIABETIC POLYNEUROPATHY, WITHOUT LONG-TERM CURRENT USE OF INSULIN: ICD-10-CM

## 2023-12-21 RX ORDER — SEMAGLUTIDE 2.68 MG/ML
2 INJECTION, SOLUTION SUBCUTANEOUS WEEKLY
Qty: 3 ML | Refills: 2 | Status: SHIPPED | OUTPATIENT
Start: 2023-12-21

## 2023-12-21 NOTE — TELEPHONE ENCOUNTER
Caller: Meliza Arreola    Relationship: Self    Best call back number: [phone]     Requested Prescriptions:   Requested Prescriptions     Pending Prescriptions Disp Refills    Semaglutide, 2 MG/DOSE, (Ozempic, 2 MG/DOSE,) 8 MG/3ML solution pen-injector 3 mL 2        Pharmacy where request should be sent: [facility] [address] [place] [address] [address] [facility] [phone] [facility] [phone] FX     Last office visit with prescribing clinician: 10/13/2023   Last telemedicine visit with prescribing clinician: Visit date not found   Next office visit with prescribing clinician: 1/19/2024     Additional details provided by patient: LESS THAN THREE DAY SUPPLY.     Does the patient have less than a 3 day supply:  [x] Yes  [] No    Would you like a call back once the refill request has been completed: [x] Yes [] No    If the office needs to give you a call back, can they leave a voicemail: [x] Yes [] No    Srinivasan Small Rep   12/21/23 14:11 EST

## 2024-01-04 ENCOUNTER — PRIOR AUTHORIZATION (OUTPATIENT)
Dept: FAMILY MEDICINE CLINIC | Age: 64
End: 2024-01-04
Payer: MEDICARE

## 2024-02-02 DIAGNOSIS — E11.42 TYPE 2 DIABETES MELLITUS WITH DIABETIC POLYNEUROPATHY, WITHOUT LONG-TERM CURRENT USE OF INSULIN: ICD-10-CM

## 2024-02-02 RX ORDER — SEMAGLUTIDE 2.68 MG/ML
INJECTION, SOLUTION SUBCUTANEOUS
Qty: 3 ML | Refills: 2 | OUTPATIENT
Start: 2024-02-02

## 2025-05-03 NOTE — TELEPHONE ENCOUNTER
Caller: Meliza Arreola    Relationship: Self    Best call back number: [phone]    Requested Prescriptions:   Requested Prescriptions     Pending Prescriptions Disp Refills    HYDROcodone-acetaminophen (NORCO) 7.5-325 MG per tablet 75 tablet 0     Sig: Take 1 tablet by mouth 3 (Three) Times a Day As Needed for Moderate Pain. for pain        Pharmacy where request should be sent: [place] [id number] [facility] [phone] [facility] [phone] FX     Last office visit with prescribing clinician: 10/13/2023   Last telemedicine visit with prescribing clinician: Visit date not found   Next office visit with prescribing clinician: 1/19/2024     Additional details provided by patient:      Does the patient have less than a 3 day supply:  [x] Yes  [] No    Would you like a call back once the refill request has been completed:   [] Yes [x] No    If the office needs to give you a call back, can they leave a voicemail: [] Yes [x] No    Srinivasan Ledezma Rep   11/10/23 09:14 EST        
LV:10/13/23  LF:10/13/23 #75  Tox:10/13/23  
Verbalized Understanding

## 2025-06-03 DIAGNOSIS — E11.42 TYPE 2 DIABETES MELLITUS WITH DIABETIC POLYNEUROPATHY, WITHOUT LONG-TERM CURRENT USE OF INSULIN: ICD-10-CM

## 2025-06-04 RX ORDER — SEMAGLUTIDE 2.68 MG/ML
INJECTION, SOLUTION SUBCUTANEOUS
Qty: 3 ML | Refills: 2 | OUTPATIENT
Start: 2025-06-04